# Patient Record
Sex: FEMALE | Race: BLACK OR AFRICAN AMERICAN | Employment: UNEMPLOYED | URBAN - METROPOLITAN AREA
[De-identification: names, ages, dates, MRNs, and addresses within clinical notes are randomized per-mention and may not be internally consistent; named-entity substitution may affect disease eponyms.]

---

## 2017-01-28 NOTE — TELEPHONE ENCOUNTER
Please advise on refill request.     Recent Visits       Provider Department Primary Dx    3 months ago Consuelo Medina MD [facility], [address], [place] Chronic nonintractable headache, unspecified headache type    5 months ago [place],

## 2017-01-31 RX ORDER — DIAZEPAM 5 MG/1
TABLET ORAL
Qty: 30 TABLET | Refills: 0 | OUTPATIENT
Start: 2017-01-31 | End: 2017-03-01

## 2017-02-01 NOTE — TELEPHONE ENCOUNTER
Nathanael form [facility] pharmacy states they have nothing on file, No medication.  Please advise

## 2017-02-03 RX ORDER — DIAZEPAM 5 MG/1
TABLET ORAL
Qty: 30 TABLET | Refills: 0 | OUTPATIENT
Start: 2017-02-03

## 2017-02-03 RX ORDER — BUTALBITAL, ACETAMINOPHEN AND CAFFEINE 50; 325; 40 MG/1; MG/1; MG/1
TABLET ORAL
Qty: 30 TABLET | Refills: 0 | OUTPATIENT
Start: 2017-02-03 | End: 2017-02-21

## 2017-02-03 NOTE — TELEPHONE ENCOUNTER
Refill request for fioricet -40 mg, take 1 tab every 4 hrs as needed, #30, no refills    LOV: 12/5/16  NOV: None  Last refilled on 12/27/16

## 2017-02-03 NOTE — TELEPHONE ENCOUNTER
Andry Urena RN at 2/1/2017  4:48 PM      Status: Signed : Andry Urena RN (Registered Nurse)     Expand All Collapse All    Diazepam 5 mg phoned to [facility] pharmacy as directed by Dr Regina Mi.

## 2017-02-21 NOTE — TELEPHONE ENCOUNTER
Refill request for fioricet -40 mg, take 1 tab every 4 hrs as needed, #30, no refills    LOV: 12/5/16  NOV: none  Last refilled on 2/3/17

## 2017-02-24 RX ORDER — BUTALBITAL, ACETAMINOPHEN AND CAFFEINE 50; 325; 40 MG/1; MG/1; MG/1
TABLET ORAL
Qty: 30 TABLET | Refills: 0 | OUTPATIENT
Start: 2017-02-24 | End: 2017-03-21

## 2017-02-24 NOTE — TELEPHONE ENCOUNTER
Approved          Disp Refills Start End       BUTALBITAL-APAP-CAFFEINE -40 MG Oral Tab 30 tablet 0 2/24/2017        Sig:  TAKE 1 TABLET BY MOUTH EVERY 4 HOURS AS NEEDED       Class:  Phone in       ANTHONY:  No       Authorizing Provider:  Radha Mejia,

## 2017-03-01 RX ORDER — DIAZEPAM 5 MG/1
TABLET ORAL
Qty: 30 TABLET | Refills: 0 | Status: SHIPPED | OUTPATIENT
Start: 2017-03-01 | End: 2017-03-25

## 2017-03-01 NOTE — TELEPHONE ENCOUNTER
Per printed script for Dr. Nuria Summers, [facility] 5 mg tabs. Qty # 30 with no additional refills.

## 2017-03-21 NOTE — TELEPHONE ENCOUNTER
Refill request for fioricet -40 mg, take 1 tab every 4 hrs as needed, #30, no refills    LOV: 12/05/2016  NOV: none  Last refilled on 2/24/17

## 2017-03-22 RX ORDER — BUTALBITAL, ACETAMINOPHEN AND CAFFEINE 50; 325; 40 MG/1; MG/1; MG/1
TABLET ORAL
Qty: 30 TABLET | Refills: 0 | OUTPATIENT
Start: 2017-03-22 | End: 2017-04-14

## 2017-03-27 RX ORDER — DIAZEPAM 5 MG/1
TABLET ORAL
Qty: 30 TABLET | Refills: 0 | Status: SHIPPED | OUTPATIENT
Start: 2017-03-27 | End: 2017-04-29

## 2017-03-27 NOTE — TELEPHONE ENCOUNTER
Approved refill called to [facility], for Diazepam 5 mg tab, qty # 30 with no additional refills as directed by Dr. Gordo Mon.

## 2017-04-14 ENCOUNTER — TELEPHONE (OUTPATIENT)
Dept: NEUROLOGY | Facility: CLINIC | Age: 52
End: 2017-04-14

## 2017-04-14 RX ORDER — BUTALBITAL, ACETAMINOPHEN AND CAFFEINE 50; 325; 40 MG/1; MG/1; MG/1
TABLET ORAL
Qty: 30 TABLET | Refills: 0 | Status: SHIPPED | OUTPATIENT
Start: 2017-04-14 | End: 2017-04-14

## 2017-04-14 RX ORDER — BUTALBITAL, ACETAMINOPHEN AND CAFFEINE 50; 325; 40 MG/1; MG/1; MG/1
TABLET ORAL
Qty: 30 TABLET | Refills: 0 | Status: SHIPPED | OUTPATIENT
Start: 2017-04-14 | End: 2017-05-26

## 2017-04-14 NOTE — TELEPHONE ENCOUNTER
Prescription for Fioricet has been called into [facility].

## 2017-04-17 ENCOUNTER — TELEPHONE (OUTPATIENT)
Dept: INTERNAL MEDICINE CLINIC | Facility: CLINIC | Age: 52
End: 2017-04-17

## 2017-04-17 DIAGNOSIS — S82.831A CLOSED FRACTURE OF DISTAL END OF RIGHT FIBULA, UNSPECIFIED FRACTURE MORPHOLOGY, INITIAL ENCOUNTER: Primary | ICD-10-CM
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
## 2017-04-18 ENCOUNTER — TELEPHONE (OUTPATIENT)
Dept: NEUROLOGY | Facility: CLINIC | Age: 52
End: 2017-04-18

## 2017-04-18 NOTE — TELEPHONE ENCOUNTER
Spoke to Will at [facility] and was told that they are not in contract with [facility]. The name that was given to the patient when she was in the ER was  only a recommendation .  Since this is an out of network provider she may be responsible

## 2017-04-20 ENCOUNTER — TELEPHONE (OUTPATIENT)
Dept: INTERNAL MEDICINE CLINIC | Facility: CLINIC | Age: 52
End: 2017-04-20

## 2017-04-20 DIAGNOSIS — S82.90XA: Primary | ICD-10-CM

## 2017-04-20 NOTE — TELEPHONE ENCOUNTER
Per pt, she stts that Dr Teddy Quevedo will accept her as long as she has the referral.  And [facility] referred pt to go and see this

## 2017-04-21 NOTE — TELEPHONE ENCOUNTER
I spoke with Dr Gonzalo Gonzáles office and was advised they do not accept Poonam Cespedes. I spoke with Will in Dr Leida Gomez office and he indicated the patient was directed back to pcp because they do not accept her insurance.  I will process referral for Melania Infante

## 2017-04-21 NOTE — TELEPHONE ENCOUNTER
Managed care, please see referral to see Dr. Teddy Quevedo already ordered by TK on 4/18. Please notify pt when approved.

## 2017-05-01 RX ORDER — DIAZEPAM 5 MG/1
TABLET ORAL
Qty: 30 TABLET | Refills: 0 | Status: SHIPPED | OUTPATIENT
Start: 2017-05-01 | End: 2017-05-30

## 2017-05-01 NOTE — TELEPHONE ENCOUNTER
Per printed script from Dr. Kristi Swartz, refill called to [facility] for Diazepam 5 mg tab, qty# 30 with no additional refills.

## 2017-05-02 NOTE — TELEPHONE ENCOUNTER
Current outpatient prescriptions:   •  •  AmLODIPine Besylate 5 MG Oral Tab, Take 1 tablet (5 mg total) by mouth daily. , Disp: 90 tablet, Rfl: 3

## 2017-05-08 NOTE — TELEPHONE ENCOUNTER
Pt f/u on refill request. She is out of meds and pharmacy did give her 3 emergency pills. Refill should be going to the [facility].

## 2017-05-09 RX ORDER — AMLODIPINE BESYLATE 5 MG/1
5 TABLET ORAL DAILY
Qty: 30 TABLET | Refills: 0 | Status: SHIPPED | OUTPATIENT
Start: 2017-05-09 | End: 2017-06-07

## 2017-05-09 NOTE — TELEPHONE ENCOUNTER
Dr. Regina Mi, 30 day supply sent to pts pharmacy per protocol d/t pt being out of rx. Please advise on further refills. Thank you. Spoke to pt and informed 30 day supply was sent to pharmacy and dr. Regina Mi is to advise on further refills.  Pt verbaliz

## 2017-05-10 RX ORDER — AMLODIPINE BESYLATE 5 MG/1
5 TABLET ORAL DAILY
Qty: 30 TABLET | Refills: 0 | Status: SHIPPED | OUTPATIENT
Start: 2017-05-10 | End: 2018-01-16 | Stop reason: DRUGHIGH
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
## 2017-05-15 ENCOUNTER — TELEPHONE (OUTPATIENT)
Dept: NEUROLOGY | Facility: CLINIC | Age: 52
End: 2017-05-15

## 2017-05-25 ENCOUNTER — LAB ENCOUNTER (OUTPATIENT)
Dept: LAB | Age: 52
End: 2017-05-25
Attending: INTERNAL MEDICINE
Payer: MEDICAID

## 2017-05-25 ENCOUNTER — OFFICE VISIT (OUTPATIENT)
Dept: INTERNAL MEDICINE CLINIC | Facility: CLINIC | Age: 52
End: 2017-05-25

## 2017-05-25 VITALS
HEART RATE: 83 BPM | HEIGHT: 64 IN | WEIGHT: 171 LBS | BODY MASS INDEX: 29.19 KG/M2 | DIASTOLIC BLOOD PRESSURE: 78 MMHG | TEMPERATURE: 97 F | SYSTOLIC BLOOD PRESSURE: 113 MMHG

## 2017-05-25 DIAGNOSIS — E34.8 PINEAL GLAND CYST: ICD-10-CM

## 2017-05-25 DIAGNOSIS — Z00.00 ANNUAL PHYSICAL EXAM: ICD-10-CM

## 2017-05-25 DIAGNOSIS — I10 ESSENTIAL HYPERTENSION WITH GOAL BLOOD PRESSURE LESS THAN 140/90: ICD-10-CM

## 2017-05-25 DIAGNOSIS — K92.1 HEMATOCHEZIA: ICD-10-CM

## 2017-05-25 DIAGNOSIS — G43.009 MIGRAINE WITHOUT AURA AND WITHOUT STATUS MIGRAINOSUS, NOT INTRACTABLE: ICD-10-CM

## 2017-05-25 DIAGNOSIS — Z00.00 ANNUAL PHYSICAL EXAM: Primary | ICD-10-CM

## 2017-05-25 DIAGNOSIS — E78.2 MIXED HYPERLIPIDEMIA: ICD-10-CM

## 2017-05-25 PROCEDURE — 80061 LIPID PANEL: CPT

## 2017-05-25 PROCEDURE — 80053 COMPREHEN METABOLIC PANEL: CPT

## 2017-05-25 PROCEDURE — 36415 COLL VENOUS BLD VENIPUNCTURE: CPT

## 2017-05-25 PROCEDURE — 99396 PREV VISIT EST AGE 40-64: CPT | Performed by: INTERNAL MEDICINE

## 2017-05-25 PROCEDURE — 81003 URINALYSIS AUTO W/O SCOPE: CPT

## 2017-05-25 PROCEDURE — 85025 COMPLETE CBC W/AUTO DIFF WBC: CPT

## 2017-05-25 RX ORDER — IBUPROFEN 600 MG/1
1 TABLET ORAL AS NEEDED
Refills: 0 | COMMUNITY
Start: 2017-05-17 | End: 2021-06-08

## 2017-05-25 NOTE — PROGRESS NOTES
HPI:    Patient ID: Sunita Shin is a 54year old female. HPI Comments: Patient presents today for her annual physical.      Review of Systems   Constitutional: Negative for fever and appetite change.    Respiratory: Negative for cough, shortness of br exhibits no discharge. Left eye exhibits no discharge. No scleral icterus. Neck: Normal range of motion. Neck supple. No JVD present. No thyromegaly present. Cardiovascular: Normal rate, regular rhythm and normal heart sounds.   Exam reveals no gallop a Visit:  No prescriptions requested or ordered in this encounter       Imaging & Referrals:  None       RD#[id number]

## 2017-05-26 RX ORDER — BUTALBITAL, ACETAMINOPHEN AND CAFFEINE 50; 325; 40 MG/1; MG/1; MG/1
TABLET ORAL
Qty: 30 TABLET | Refills: 0 | OUTPATIENT
Start: 2017-05-26 | End: 2017-07-06

## 2017-05-26 NOTE — TELEPHONE ENCOUNTER
Refill request for fioricet -40 mg, take 1 tab as needed, #30, no refills    LOV: 12/5/16  NOV: 6/7/17

## 2017-05-30 RX ORDER — DIAZEPAM 5 MG/1
TABLET ORAL
Qty: 30 TABLET | Refills: 0 | Status: SHIPPED | OUTPATIENT
Start: 2017-05-30 | End: 2017-06-28

## 2017-05-30 NOTE — TELEPHONE ENCOUNTER
Per printed script by Dr. Santo Lugo, refill called to [facility] for Diazepam 5 mg qty # 30 with no additional refills.

## 2017-06-02 NOTE — TELEPHONE ENCOUNTER
Pharmacy called in to follow up on refill request for pt.     Current outpatient prescriptions:     •  Lovastatin 20 MG Oral Tab, Take 1 tablet (20 mg total) by mouth nightly., Disp: 90 tablet, Rfl: 3

## 2017-06-05 ENCOUNTER — TELEPHONE (OUTPATIENT)
Dept: GASTROENTEROLOGY | Facility: CLINIC | Age: 52
End: 2017-06-05

## 2017-06-05 ENCOUNTER — OFFICE VISIT (OUTPATIENT)
Dept: GASTROENTEROLOGY | Facility: CLINIC | Age: 52
End: 2017-06-05

## 2017-06-05 VITALS
SYSTOLIC BLOOD PRESSURE: 138 MMHG | HEIGHT: 64 IN | DIASTOLIC BLOOD PRESSURE: 90 MMHG | WEIGHT: 166 LBS | BODY MASS INDEX: 28.34 KG/M2 | HEART RATE: 67 BPM

## 2017-06-05 DIAGNOSIS — R10.30 LOWER ABDOMINAL PAIN: ICD-10-CM

## 2017-06-05 DIAGNOSIS — R19.4 ALTERED BOWEL HABITS: ICD-10-CM

## 2017-06-05 DIAGNOSIS — K21.9 GASTROESOPHAGEAL REFLUX DISEASE, ESOPHAGITIS PRESENCE NOT SPECIFIED: ICD-10-CM

## 2017-06-05 DIAGNOSIS — R45.89 INEFFECTIVE COPING: ICD-10-CM

## 2017-06-05 DIAGNOSIS — K62.5 RECTAL BLEEDING: Primary | ICD-10-CM

## 2017-06-05 DIAGNOSIS — K92.1 HEMATOCHEZIA: ICD-10-CM

## 2017-06-05 PROCEDURE — 99212 OFFICE O/P EST SF 10 MIN: CPT | Performed by: NURSE PRACTITIONER

## 2017-06-05 PROCEDURE — 99244 OFF/OP CNSLTJ NEW/EST MOD 40: CPT | Performed by: NURSE PRACTITIONER

## 2017-06-05 RX ORDER — LOVASTATIN 20 MG/1
20 TABLET ORAL NIGHTLY
Qty: 90 TABLET | Refills: 1 | Status: SHIPPED | OUTPATIENT
Start: 2017-06-05 | End: 2017-12-16

## 2017-06-05 NOTE — PATIENT INSTRUCTIONS
1. Schedule colonoscopy with Dr. Rose Currie w/ [facility]    2.  bowel prep from pharmacy Colyte (eRx)    3. Continue all medications for procedure    4. Read all bowel prep instructions carefully    5.  AVOID seeds, nuts, popcorn, raw fruits and vegetables (
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
## 2017-06-05 NOTE — TELEPHONE ENCOUNTER
Scheduled for:  Colonoscopy 14087  Provider Name: Dr Perla Jones  Date:  Ygnacio Duane 8/17/17  Location:  [facility]  Sedation:  MAC  Time:  9:45 am  Prep: colyte  Meds/Allergies Reconciled?:  PCN  Diagnosis with codes:  Rectal bleeding K62.5  Was patient informed to call insura

## 2017-06-07 ENCOUNTER — OFFICE VISIT (OUTPATIENT)
Dept: NEUROLOGY | Facility: CLINIC | Age: 52
End: 2017-06-07

## 2017-06-07 VITALS
OXYGEN SATURATION: 99 % | HEART RATE: 91 BPM | SYSTOLIC BLOOD PRESSURE: 136 MMHG | HEIGHT: 64 IN | BODY MASS INDEX: 25.61 KG/M2 | DIASTOLIC BLOOD PRESSURE: 90 MMHG | WEIGHT: 150 LBS

## 2017-06-07 DIAGNOSIS — S06.9X9S TBI (TRAUMATIC BRAIN INJURY), WITH LOC OF UNSPECIFIED DURATION, SEQUELA: ICD-10-CM

## 2017-06-07 DIAGNOSIS — R51.9 CHRONIC NONINTRACTABLE HEADACHE, UNSPECIFIED HEADACHE TYPE: Primary | ICD-10-CM

## 2017-06-07 DIAGNOSIS — R40.4 EPISODIC ALTERED AWARENESS: ICD-10-CM

## 2017-06-07 DIAGNOSIS — Z86.39 HISTORY OF PINEAL CYST: ICD-10-CM

## 2017-06-07 DIAGNOSIS — G89.29 CHRONIC NONINTRACTABLE HEADACHE, UNSPECIFIED HEADACHE TYPE: Primary | ICD-10-CM

## 2017-06-07 PROCEDURE — 99214 OFFICE O/P EST MOD 30 MIN: CPT | Performed by: OTHER

## 2017-06-07 RX ORDER — AMLODIPINE BESYLATE 5 MG/1
TABLET ORAL
Qty: 30 TABLET | Refills: 2 | Status: SHIPPED | OUTPATIENT
Start: 2017-06-07 | End: 2017-07-25

## 2017-06-07 RX ORDER — AMITRIPTYLINE HYDROCHLORIDE 25 MG/1
25 TABLET, FILM COATED ORAL NIGHTLY
Qty: 1 TABLET | Refills: 1 | Status: SHIPPED | OUTPATIENT
Start: 2017-06-07 | End: 2017-08-01

## 2017-06-08 ENCOUNTER — TELEPHONE (OUTPATIENT)
Dept: NEUROLOGY | Facility: CLINIC | Age: 52
End: 2017-06-08

## 2017-06-08 NOTE — TELEPHONE ENCOUNTER
[facility] from [address] [place] [address] 6/7/17. Told to dispense #30 tabs.

## 2017-06-13 NOTE — PROGRESS NOTES
[facility]    Justo Lennon is a 54year old female. HPI:     Patient presents for follow-up. She was previously followed by Dr. Liza Raymond, last seen in December 2016. Notes reviewed.   Patient is a somewhat suboptimal historian intracranial process. 3.  Chronic bilateral mastoid air cell opacification.  Correlate for bilateral otomastoiditis .  Trace chronic inflammation of the ethmoid sinuses.         Past Medical History: reviewed    Past Surgical History: reviewed    Social Hi regular rate and rhythm   Lungs: clear to auscultation bilaterally  Neuro:  Mental Status: alert, speech fluent and appropriate       Cranial Nerves: pupils equal, round, and reactive to light; extraocular movements intact; facial sensation intact V1-3, fa

## 2017-06-28 RX ORDER — DIAZEPAM 5 MG/1
TABLET ORAL
Qty: 30 TABLET | Refills: 0 | Status: SHIPPED | OUTPATIENT
Start: 2017-06-28 | End: 2017-07-27

## 2017-07-05 RX ORDER — BUTALBITAL, ACETAMINOPHEN AND CAFFEINE 50; 325; 40 MG/1; MG/1; MG/1
TABLET ORAL
Qty: 30 TABLET | Refills: 1 | Status: CANCELLED | OUTPATIENT
Start: 2017-07-05

## 2017-07-05 NOTE — TELEPHONE ENCOUNTER
LOV was 6/07/17. Last Butalbital-acet-caffeine, #30 tab refill, was given 5/26/17. Dr. Duane Kaye office note on 6/07/17 states that patient was to \"decrease frequency of Fioricet use\". Patient wants refill. Please advise.

## 2017-07-07 NOTE — TELEPHONE ENCOUNTER
Medication request: Butalbital-Apap-Caffeine -40mg    LOV 6/7/17  NOV 7/21/17    Last refill: 6/14/17 #30 per pharmacy

## 2017-07-08 RX ORDER — BUTALBITAL, ACETAMINOPHEN AND CAFFEINE 50; 325; 40 MG/1; MG/1; MG/1
TABLET ORAL
Qty: 30 TABLET | Refills: 1 | OUTPATIENT
Start: 2017-07-08 | End: 2017-07-25

## 2017-07-10 RX ORDER — BUTALBITAL, ACETAMINOPHEN AND CAFFEINE 50; 325; 40 MG/1; MG/1; MG/1
TABLET ORAL
Qty: 30 TABLET | Refills: 0 | OUTPATIENT
Start: 2017-07-10 | End: 2017-08-07

## 2017-07-17 ENCOUNTER — TELEPHONE (OUTPATIENT)
Dept: NEUROLOGY | Facility: CLINIC | Age: 52
End: 2017-07-17

## 2017-07-20 ENCOUNTER — TELEPHONE (OUTPATIENT)
Dept: INTERNAL MEDICINE CLINIC | Facility: CLINIC | Age: 52
End: 2017-07-20

## 2017-07-20 NOTE — TELEPHONE ENCOUNTER
Pharmacy states that that because Rx has never been filled at their location, they are unable to e-scribe request and don't have dose information.  Patient is requesting refill for the following medication:    Current Outpatient Prescriptions:  lisinopril 1

## 2017-07-21 RX ORDER — LISINOPRIL 10 MG/1
TABLET ORAL
Qty: 180 TABLET | Refills: 0 | Status: SHIPPED | OUTPATIENT
Start: 2017-07-21 | End: 2017-10-17

## 2017-07-21 NOTE — TELEPHONE ENCOUNTER
Refill protocol criteria met, rx sent.       Hypertensive Medications  Protocol Criteria:  · Appointment scheduled in the past 6 months or in the next 3 months  · BMP or CMP in the past 12 months  · Creatinine result < 2  Recent Outpatient Visits

## 2017-07-21 NOTE — TELEPHONE ENCOUNTER
Pt calling c/o of sx because she is out of med. Pt was transferred to triage. Pt checking status of refill.

## 2017-07-25 ENCOUNTER — OFFICE VISIT (OUTPATIENT)
Dept: NEUROLOGY | Facility: CLINIC | Age: 52
End: 2017-07-25

## 2017-07-25 VITALS
RESPIRATION RATE: 16 BRPM | BODY MASS INDEX: 25.61 KG/M2 | WEIGHT: 150 LBS | SYSTOLIC BLOOD PRESSURE: 116 MMHG | HEIGHT: 64 IN | DIASTOLIC BLOOD PRESSURE: 82 MMHG

## 2017-07-25 DIAGNOSIS — R40.4 EPISODIC ALTERED AWARENESS: ICD-10-CM

## 2017-07-25 DIAGNOSIS — R51.9 CHRONIC NONINTRACTABLE HEADACHE, UNSPECIFIED HEADACHE TYPE: Primary | ICD-10-CM

## 2017-07-25 DIAGNOSIS — G89.29 CHRONIC NONINTRACTABLE HEADACHE, UNSPECIFIED HEADACHE TYPE: Primary | ICD-10-CM

## 2017-07-25 DIAGNOSIS — Z86.39 HISTORY OF PINEAL CYST: ICD-10-CM

## 2017-07-25 DIAGNOSIS — S06.9X9S TBI (TRAUMATIC BRAIN INJURY), WITH LOC OF UNSPECIFIED DURATION, SEQUELA: ICD-10-CM

## 2017-07-25 PROCEDURE — 99213 OFFICE O/P EST LOW 20 MIN: CPT | Performed by: OTHER

## 2017-07-25 NOTE — PROGRESS NOTES
[facility]    Wandy Montes is a 54year old female. HPI:     Patient is being seen in follow-up. I saw her in clinic last 6/7/17. Since last visit, she has been doing a little bit better with the amitriptyline.   She has be fevers  HEENT: See HPI  RESPIRATORY: denies shortness of breath, wheezing or cough   CARDIOVASCULAR: Uncontrolled blood pressure  MUSCULOSKELETAL: see HPI  NEURO: see HPI      PHYSICAL EXAM:     Vitals:  /82 (BP Location: Left arm, Patient Position:

## 2017-07-27 RX ORDER — DIAZEPAM 5 MG/1
TABLET ORAL
Qty: 30 TABLET | Refills: 0 | Status: SHIPPED | OUTPATIENT
Start: 2017-07-27 | End: 2017-08-24

## 2017-08-01 RX ORDER — AMITRIPTYLINE HYDROCHLORIDE 25 MG/1
TABLET, FILM COATED ORAL
Qty: 30 TABLET | Refills: 1 | Status: SHIPPED | OUTPATIENT
Start: 2017-08-01 | End: 2017-09-24

## 2017-08-07 NOTE — TELEPHONE ENCOUNTER
Medication request: Butalbital-Apap-Caffeine -40mg take 1 tab prn, qt:30 1 refill    LOV: 7/25/17  NOV: None    Last refill: 7/10/17 qt:30

## 2017-08-08 RX ORDER — BUTALBITAL, ACETAMINOPHEN AND CAFFEINE 50; 325; 40 MG/1; MG/1; MG/1
TABLET ORAL
Qty: 30 TABLET | Refills: 1 | OUTPATIENT
Start: 2017-08-08 | End: 2017-09-06

## 2017-08-17 ENCOUNTER — ANESTHESIA (OUTPATIENT)
Dept: ENDOSCOPY | Facility: HOSPITAL | Age: 52
End: 2017-08-17

## 2017-08-17 ENCOUNTER — TELEPHONE (OUTPATIENT)
Dept: GASTROENTEROLOGY | Facility: CLINIC | Age: 52
End: 2017-08-17

## 2017-08-17 ENCOUNTER — SURGERY (OUTPATIENT)
Age: 52
End: 2017-08-17

## 2017-08-17 ENCOUNTER — HOSPITAL ENCOUNTER (OUTPATIENT)
Facility: HOSPITAL | Age: 52
Setting detail: HOSPITAL OUTPATIENT SURGERY
Discharge: HOME | End: 2017-08-17
Attending: INTERNAL MEDICINE | Admitting: INTERNAL MEDICINE
Payer: COMMERCIAL

## 2017-08-17 ENCOUNTER — ANESTHESIA EVENT (OUTPATIENT)
Dept: ENDOSCOPY | Facility: HOSPITAL | Age: 52
End: 2017-08-17

## 2017-08-17 VITALS
HEIGHT: 64 IN | OXYGEN SATURATION: 99 % | WEIGHT: 155 LBS | RESPIRATION RATE: 15 BRPM | HEART RATE: 70 BPM | SYSTOLIC BLOOD PRESSURE: 116 MMHG | DIASTOLIC BLOOD PRESSURE: 88 MMHG | BODY MASS INDEX: 26.46 KG/M2

## 2017-08-17 DIAGNOSIS — K64.9 HEMORRHOIDS, UNSPECIFIED HEMORRHOID TYPE: Primary | ICD-10-CM

## 2017-08-17 DIAGNOSIS — K64.9 HEMORRHOIDS, UNSPECIFIED HEMORRHOID TYPE: ICD-10-CM

## 2017-08-17 DIAGNOSIS — K62.5 RECTAL BLEEDING: Primary | ICD-10-CM

## 2017-08-17 PROCEDURE — 0DJD8ZZ INSPECTION OF LOWER INTESTINAL TRACT, VIA NATURAL OR ARTIFICIAL OPENING ENDOSCOPIC: ICD-10-PCS | Performed by: INTERNAL MEDICINE

## 2017-08-17 PROCEDURE — 45378 DIAGNOSTIC COLONOSCOPY: CPT | Performed by: INTERNAL MEDICINE

## 2017-08-17 RX ORDER — SODIUM CHLORIDE, SODIUM LACTATE, POTASSIUM CHLORIDE, CALCIUM CHLORIDE 600; 310; 30; 20 MG/100ML; MG/100ML; MG/100ML; MG/100ML
INJECTION, SOLUTION INTRAVENOUS CONTINUOUS
Status: DISCONTINUED | OUTPATIENT
Start: 2017-08-17 | End: 2017-08-17

## 2017-08-17 RX ORDER — NALOXONE HYDROCHLORIDE 0.4 MG/ML
80 INJECTION, SOLUTION INTRAMUSCULAR; INTRAVENOUS; SUBCUTANEOUS AS NEEDED
Status: DISCONTINUED | OUTPATIENT
Start: 2017-08-17 | End: 2017-08-17

## 2017-08-17 RX ORDER — SODIUM CHLORIDE, SODIUM LACTATE, POTASSIUM CHLORIDE, CALCIUM CHLORIDE 600; 310; 30; 20 MG/100ML; MG/100ML; MG/100ML; MG/100ML
INJECTION, SOLUTION INTRAVENOUS CONTINUOUS
Status: CANCELLED | OUTPATIENT
Start: 2017-08-17

## 2017-08-17 RX ORDER — HYDROMORPHONE HYDROCHLORIDE 1 MG/ML
0.4 INJECTION, SOLUTION INTRAMUSCULAR; INTRAVENOUS; SUBCUTANEOUS EVERY 5 MIN PRN
Status: DISCONTINUED | OUTPATIENT
Start: 2017-08-17 | End: 2017-08-17

## 2017-08-17 RX ORDER — LIDOCAINE HYDROCHLORIDE 10 MG/ML
INJECTION, SOLUTION EPIDURAL; INFILTRATION; INTRACAUDAL; PERINEURAL AS NEEDED
Status: DISCONTINUED | OUTPATIENT
Start: 2017-08-17 | End: 2017-08-17 | Stop reason: SURG

## 2017-08-17 RX ORDER — HYDROMORPHONE HYDROCHLORIDE 1 MG/ML
0.2 INJECTION, SOLUTION INTRAMUSCULAR; INTRAVENOUS; SUBCUTANEOUS EVERY 5 MIN PRN
Status: DISCONTINUED | OUTPATIENT
Start: 2017-08-17 | End: 2017-08-17

## 2017-08-17 RX ORDER — HYDROMORPHONE HYDROCHLORIDE 1 MG/ML
0.6 INJECTION, SOLUTION INTRAMUSCULAR; INTRAVENOUS; SUBCUTANEOUS EVERY 5 MIN PRN
Status: DISCONTINUED | OUTPATIENT
Start: 2017-08-17 | End: 2017-08-17

## 2017-08-17 RX ORDER — NALOXONE HYDROCHLORIDE 0.4 MG/ML
80 INJECTION, SOLUTION INTRAMUSCULAR; INTRAVENOUS; SUBCUTANEOUS AS NEEDED
Status: CANCELLED | OUTPATIENT
Start: 2017-08-17 | End: 2017-08-17

## 2017-08-17 RX ORDER — HALOPERIDOL 5 MG/ML
0.25 INJECTION INTRAMUSCULAR ONCE AS NEEDED
Status: DISCONTINUED | OUTPATIENT
Start: 2017-08-17 | End: 2017-08-17

## 2017-08-17 RX ORDER — ONDANSETRON 2 MG/ML
4 INJECTION INTRAMUSCULAR; INTRAVENOUS ONCE AS NEEDED
Status: DISCONTINUED | OUTPATIENT
Start: 2017-08-17 | End: 2017-08-17

## 2017-08-17 RX ORDER — MORPHINE SULFATE 4 MG/ML
2 INJECTION, SOLUTION INTRAMUSCULAR; INTRAVENOUS EVERY 10 MIN PRN
Status: DISCONTINUED | OUTPATIENT
Start: 2017-08-17 | End: 2017-08-17

## 2017-08-17 RX ADMIN — SODIUM CHLORIDE, SODIUM LACTATE, POTASSIUM CHLORIDE, CALCIUM CHLORIDE: 600; 310; 30; 20 INJECTION, SOLUTION INTRAVENOUS at 09:56:00

## 2017-08-17 RX ADMIN — LIDOCAINE HYDROCHLORIDE 50 MG: 10 INJECTION, SOLUTION EPIDURAL; INFILTRATION; INTRACAUDAL; PERINEURAL at 09:29:00

## 2017-08-17 RX ADMIN — SODIUM CHLORIDE, SODIUM LACTATE, POTASSIUM CHLORIDE, CALCIUM CHLORIDE: 600; 310; 30; 20 INJECTION, SOLUTION INTRAVENOUS at 09:26:00

## 2017-08-17 NOTE — ANESTHESIA PREPROCEDURE EVALUATION
Anesthesia PreOp Note    HPI:     Parker Ledesma is a 54year old female who presents for preoperative consultation requested by: Rhea Moyer MD    Date of Surgery: 8/17/2017    Procedure(s):  COLONOSCOPY  Indication: Rectal bleeding      History Rev Oral Tab Take 1 tablet (5 mg total) by mouth daily.  Disp: 30 tablet Rfl: 0 8/16/2017 at 2100       Current Facility-Administered Medications Ordered in Epic:  lactated ringers infusion  Intravenous Continuous Janora Pallas Harwood Baldy, MD     No current Epic-ordere complications   Airway   Mallampati: II  TM distance: >3 FB  Neck ROM: full  Dental      Pulmonary - negative ROS and normal exam     ROS comment: Smoking hx  Cardiovascular - normal exam  (+) hypertension,     Neuro/Psych      Comments: Hx of head trauma

## 2017-08-17 NOTE — OPERATIVE REPORT
[facility] Endoscopy Report      Preoperative Diagnosis:  - rectal bleeding      Postoperative Diagnosis:  - moderated sized internal hemorrhoids      Procedure:    Colonoscopy         Surgeon:  Rafa Soriano M.D.     Anesthesia:  MAC sedat

## 2017-08-17 NOTE — TELEPHONE ENCOUNTER
Pt underwent colonoscopy today. Moderate sized internal hemorrhoids are the cause of her rectal bleeding.      Plan  - stool softeners, high fiber  - anusol suppository x 1 week  - see surgeon- RN to arrange with our surgeons  - blood count to make sure no

## 2017-08-17 NOTE — ANESTHESIA POSTPROCEDURE EVALUATION
Patient: Sherri Siemens    Procedure Summary     Date:  08/17/17 Room / Location:  [facility] ENDOSCOPY 05 / [facility] ENDOSCOPY    Anesthesia Start:  9121 Anesthesia Stop:      Procedure:  COLONOSCOPY (N/A ) Diagnosis:       Rectal bleeding      (hemorrhoids)    Surge

## 2017-08-17 NOTE — H&P
History & Physical Examination    Patient Name: Farida Thompson  MRN: [id number]  [facility]: [id number]  YOB: 1965    Diagnosis: rectal bleeding        Prescriptions Prior to Admission:  BUTALBITAL-APAP-CAFFEINE -40 MG Oral Tab TAKE 1 TABLET B Packs/day  For 21.00 Years     Types: Cigarettes    Smokeless tobacco: Not on file    Alcohol use No       SYSTEM Check if Review is Normal Check if Physical Exam is Normal If not normal, please explain:   HEENT [x ] [ x]    NECK & BACK [x ] [x ]    HEART

## 2017-08-18 NOTE — TELEPHONE ENCOUNTER
Spoke to pt on the phone. She was notified of her internal hemorrhoids. She was given the phone number to the surgeons. I put a referral in the system. She was notified to take a stool softener and high fiber diet.  She was megan notified to use an anusol s

## 2017-08-23 ENCOUNTER — TELEPHONE (OUTPATIENT)
Dept: GASTROENTEROLOGY | Facility: CLINIC | Age: 52
End: 2017-08-23

## 2017-08-23 NOTE — TELEPHONE ENCOUNTER
Dr Teresa Abreu, received determination [facility] [place] supp denied , as excluded in plan. The preferred list is     Analpram HC lotion  Pramoxine 1/5 foam --generics where available. Please advise. Thanks.

## 2017-08-23 NOTE — TELEPHONE ENCOUNTER
Current Outpatient Prescriptions:  Hydrocortisone Acetate 25 MG Rectal Suppos Place 1 suppository (25 mg total) rectally 2 (two) times daily. Disp: 14 suppository Rfl: 0     PA request pls call [phone] or change to Alt.  pls advise

## 2017-08-25 RX ORDER — DIAZEPAM 5 MG/1
TABLET ORAL
Qty: 30 TABLET | Refills: 0 | Status: SHIPPED | OUTPATIENT
Start: 2017-08-25 | End: 2017-09-24

## 2017-08-25 NOTE — TELEPHONE ENCOUNTER
Rx called into preferred lab per DR. PETERSEN approval.     DIAZEPAM 5 MG Oral Tab  TAKE 1 TABLET BY MOUTH EVERY DAY AT BEDTIME       Disp: 30 tablet Refills: 0    Class: Normal Start: 8/25/2017   Approved by: Delbert Mariano MD  To pharmacy: Not to exceed

## 2017-09-07 NOTE — TELEPHONE ENCOUNTER
Refill request for fioricet -40 mg, BID PRN, #60, 1 refill     LOV: 7/25/17  NOV: none  Last refilled on 8/8/17 for #30 with 1 refill

## 2017-09-08 RX ORDER — BUTALBITAL, ACETAMINOPHEN AND CAFFEINE 50; 325; 40 MG/1; MG/1; MG/1
TABLET ORAL
Qty: 30 TABLET | Refills: 0 | OUTPATIENT
Start: 2017-09-08 | End: 2017-09-24

## 2017-09-08 RX ORDER — AMLODIPINE BESYLATE 5 MG/1
TABLET ORAL
Qty: 30 TABLET | Refills: 2 | Status: SHIPPED | OUTPATIENT
Start: 2017-09-08 | End: 2017-12-30

## 2017-09-20 ENCOUNTER — TELEPHONE (OUTPATIENT)
Dept: NEUROLOGY | Facility: CLINIC | Age: 52
End: 2017-09-20

## 2017-09-21 RX ORDER — BUTALBITAL, ACETAMINOPHEN AND CAFFEINE 50; 325; 40 MG/1; MG/1; MG/1
TABLET ORAL
Qty: 30 TABLET | Refills: 0 | OUTPATIENT
Start: 2017-09-21

## 2017-09-21 NOTE — TELEPHONE ENCOUNTER
Patient out of fioricet with codeine. She is taking 3 tabs per day. Please advise if refill can be given early.

## 2017-09-25 RX ORDER — DIAZEPAM 5 MG/1
TABLET ORAL
Qty: 30 TABLET | Refills: 0 | Status: SHIPPED | OUTPATIENT
Start: 2017-09-25 | End: 2017-10-23

## 2017-09-26 RX ORDER — BUTALBITAL, ACETAMINOPHEN AND CAFFEINE 50; 325; 40 MG/1; MG/1; MG/1
TABLET ORAL
Qty: 30 TABLET | Refills: 0 | OUTPATIENT
Start: 2017-09-26 | End: 2018-01-10

## 2017-09-26 RX ORDER — BUTALBITAL, ACETAMINOPHEN AND CAFFEINE 50; 325; 40 MG/1; MG/1; MG/1
TABLET ORAL
Qty: 30 TABLET | Refills: 0 | OUTPATIENT
Start: 2017-09-26

## 2017-09-26 RX ORDER — AMITRIPTYLINE HYDROCHLORIDE 25 MG/1
TABLET, FILM COATED ORAL
Qty: 30 TABLET | Refills: 1 | Status: SHIPPED | OUTPATIENT
Start: 2017-09-26 | End: 2018-01-23

## 2017-09-26 NOTE — TELEPHONE ENCOUNTER
Refill request for Amitriptyline 25 mg take 1 tab every evening, qt:30 1 refill  Butalbital-apap-caffeine -40 mg take 1 tab BID prn, qt:30 0 refills    LOV: 7/25/17  NOV: None  Last refill: Butalbital 9/8/17 qt:30  Amitriptyline 8/1/17 qt:30 1 refill

## 2017-09-28 ENCOUNTER — TELEPHONE (OUTPATIENT)
Dept: GASTROENTEROLOGY | Facility: CLINIC | Age: 52
End: 2017-09-28

## 2017-09-28 NOTE — TELEPHONE ENCOUNTER
Mailed letter to patient notifying him of overdue labs (CBC) Ordered 8-17-17. Overdue letter mailed to patient.

## 2017-10-12 ENCOUNTER — TELEPHONE (OUTPATIENT)
Dept: NEUROLOGY | Facility: CLINIC | Age: 52
End: 2017-10-12

## 2017-10-16 RX ORDER — BUTALBITAL, ACETAMINOPHEN AND CAFFEINE 50; 325; 40 MG/1; MG/1; MG/1
TABLET ORAL
Qty: 30 TABLET | Refills: 0 | OUTPATIENT
Start: 2017-10-16

## 2017-10-17 RX ORDER — LISINOPRIL 10 MG/1
TABLET ORAL
Qty: 180 TABLET | Refills: 0 | Status: SHIPPED | OUTPATIENT
Start: 2017-10-17 | End: 2018-01-31

## 2017-10-17 NOTE — TELEPHONE ENCOUNTER
Refilled per protocol    Hypertensive Medications  Protocol Criteria:  · Appointment scheduled in the past 6 months or in the next 3 months  · BMP or CMP in the past 12 months  · Creatinine result < 2  Recent Outpatient Visits            2 months ago Chron

## 2017-10-23 RX ORDER — DIAZEPAM 5 MG/1
TABLET ORAL
Qty: 30 TABLET | Refills: 0 | OUTPATIENT
Start: 2017-10-23 | End: 2018-01-10

## 2017-10-23 NOTE — TELEPHONE ENCOUNTER
Please advise on refill request.    Recent Outpatient Visits            3 months ago Chronic nonintractable headache, unspecified headache type    RADHA Jessica, [address] MD    Office Visit    4 months ago [facility]

## 2017-10-24 RX ORDER — DIAZEPAM 5 MG/1
TABLET ORAL
Qty: 30 TABLET | Refills: 0 | OUTPATIENT
Start: 2017-10-24

## 2017-10-24 NOTE — TELEPHONE ENCOUNTER
I did approve this  Already yesterday so just needs to be called in.  However this may be the last time I can refill this since she is illinicare and we no longer take her insurance so she needs to look for another pcp who can continue her meds and her care

## 2017-10-24 NOTE — TELEPHONE ENCOUNTER
Pharmacy   [facility] [address] [facility], [phone], [phone]   Medication Detail    Disp Refills Start End    DIAZEPAM 5 MG Oral Tab 30 tablet 0 10/23/2017     Sig: TAKE 1 TABLET BY MOUTH AT BEDTIME

## 2017-12-18 RX ORDER — LOVASTATIN 20 MG/1
20 TABLET ORAL NIGHTLY
Qty: 30 TABLET | Refills: 0 | Status: SHIPPED | OUTPATIENT
Start: 2017-12-18 | End: 2018-01-22

## 2017-12-18 NOTE — TELEPHONE ENCOUNTER
Pt has illinicare and we no longer are network provider; she should look for another pcp to ffup with. We can refill her for 30 days just to tide her til she sees new pcp.

## 2017-12-20 NOTE — TELEPHONE ENCOUNTER
S/W patient. Advised med refilled x's 1 month supply. Related DR. Nava's message. Patient states she will have [employer] on January 1, 2018.

## 2017-12-30 RX ORDER — AMLODIPINE BESYLATE 5 MG/1
TABLET ORAL
Qty: 30 TABLET | Refills: 0 | Status: SHIPPED | OUTPATIENT
Start: 2017-12-30 | End: 2018-01-16 | Stop reason: DRUGHIGH

## 2018-01-05 ENCOUNTER — TELEPHONE (OUTPATIENT)
Dept: OTHER | Age: 53
End: 2018-01-05

## 2018-01-05 NOTE — TELEPHONE ENCOUNTER
Pharmacist states Amlodipine 5 mg is on back order, asking if prescription can be changed to Amlodipine 10 mg 1/2 tablet daily. Please advise.

## 2018-01-10 ENCOUNTER — TELEPHONE (OUTPATIENT)
Dept: NEUROLOGY | Facility: CLINIC | Age: 53
End: 2018-01-10

## 2018-01-10 RX ORDER — DIAZEPAM 5 MG/1
TABLET ORAL
Qty: 30 TABLET | Refills: 0 | Status: SHIPPED | OUTPATIENT
Start: 2018-01-10 | End: 2018-02-09

## 2018-01-10 RX ORDER — BUTALBITAL, ACETAMINOPHEN AND CAFFEINE 50; 325; 40 MG/1; MG/1; MG/1
TABLET ORAL
Qty: 30 TABLET | Refills: 0 | OUTPATIENT
Start: 2018-01-10

## 2018-01-10 RX ORDER — BUTALBITAL, ACETAMINOPHEN AND CAFFEINE 50; 325; 40 MG/1; MG/1; MG/1
TABLET ORAL
Qty: 30 TABLET | Refills: 0 | OUTPATIENT
Start: 2018-01-10 | End: 2018-01-23

## 2018-01-10 NOTE — TELEPHONE ENCOUNTER
Script for diazepam 1/10/18 called to [facility], message left 1/10/18. Patient notified, she has appointment with Dr. Luis Landaverde on 1/16/18. Advised patient to call if she had any issues or questions; she agreed.     [address] -

## 2018-01-10 NOTE — TELEPHONE ENCOUNTER
Refill request for fioricet -40 mg, take 1 tab BID PRN, #30, no refills    LOV: 7/25/17  NOV: 1/23/18  Last refilled on 9/26/17

## 2018-01-10 NOTE — TELEPHONE ENCOUNTER
Pt has [facility] now, updated insurance, needs refill of BUTALBITAL-APAP-CAFFEINE -40 MG     NOV: 1/23/18

## 2018-01-16 ENCOUNTER — OFFICE VISIT (OUTPATIENT)
Dept: INTERNAL MEDICINE CLINIC | Facility: CLINIC | Age: 53
End: 2018-01-16

## 2018-01-16 VITALS
WEIGHT: 200 LBS | RESPIRATION RATE: 12 BRPM | DIASTOLIC BLOOD PRESSURE: 86 MMHG | SYSTOLIC BLOOD PRESSURE: 126 MMHG | BODY MASS INDEX: 34.15 KG/M2 | HEART RATE: 82 BPM | HEIGHT: 64 IN | TEMPERATURE: 98 F

## 2018-01-16 DIAGNOSIS — S06.9X0S TRAUMATIC BRAIN INJURY, WITHOUT LOSS OF CONSCIOUSNESS, SEQUELA (HCC): Primary | ICD-10-CM

## 2018-01-16 DIAGNOSIS — Z12.39 BREAST CANCER SCREENING: ICD-10-CM

## 2018-01-16 DIAGNOSIS — Z86.39 HISTORY OF PINEAL CYST: ICD-10-CM

## 2018-01-16 DIAGNOSIS — K64.9 HEMORRHOIDS, UNSPECIFIED HEMORRHOID TYPE: ICD-10-CM

## 2018-01-16 PROCEDURE — 99212 OFFICE O/P EST SF 10 MIN: CPT | Performed by: INTERNAL MEDICINE

## 2018-01-16 PROCEDURE — 99214 OFFICE O/P EST MOD 30 MIN: CPT | Performed by: INTERNAL MEDICINE

## 2018-01-16 RX ORDER — GABAPENTIN 300 MG/1
300 CAPSULE ORAL 2 TIMES DAILY
Qty: 180 CAPSULE | Refills: 0 | Status: SHIPPED | OUTPATIENT
Start: 2018-01-16 | End: 2018-04-24

## 2018-01-16 RX ORDER — GABAPENTIN 300 MG/1
300 CAPSULE ORAL 2 TIMES DAILY
COMMUNITY
End: 2018-01-16

## 2018-01-16 RX ORDER — AMLODIPINE BESYLATE 10 MG/1
10 TABLET ORAL NIGHTLY
COMMUNITY
End: 2018-02-27

## 2018-01-16 NOTE — PROGRESS NOTES
HPI:    Patient ID: Kat Vann is a 50year old female. Headache    This is a chronic problem. The current episode started more than 1 year ago. The problem occurs intermittently. The problem has been waxing and waning.  The pain is located in the bi BASE) MCG/ACT Inhalation Aero Soln Inhale 2 puffs into the lungs every 6 (six) hours as needed.  For wheezing Disp:  Rfl: 3     Allergies:  Penicillins             Anaphylaxis   PHYSICAL EXAM:   Physical Exam   Constitutional: She appears well-developed and mouth 2 (two) times daily.            Imaging & Referrals:  NEURO - INTERNAL  SURGERY - INTERNAL  EUNICE SCREENING BILAT ([id number])       [id number]

## 2018-01-22 RX ORDER — LOVASTATIN 20 MG/1
20 TABLET ORAL NIGHTLY
Qty: 30 TABLET | Refills: 0 | Status: SHIPPED | OUTPATIENT
Start: 2018-01-22 | End: 2018-02-27

## 2018-01-22 NOTE — TELEPHONE ENCOUNTER
Spoke with pharmacy, refill not on file but they are no longer contracted with her insurance.   LMTCB to inquire if pt is using a new pharmacy

## 2018-01-23 ENCOUNTER — OFFICE VISIT (OUTPATIENT)
Dept: NEUROLOGY | Facility: CLINIC | Age: 53
End: 2018-01-23

## 2018-01-23 VITALS
HEART RATE: 80 BPM | DIASTOLIC BLOOD PRESSURE: 76 MMHG | BODY MASS INDEX: 29.02 KG/M2 | SYSTOLIC BLOOD PRESSURE: 122 MMHG | RESPIRATION RATE: 16 BRPM | WEIGHT: 170 LBS | HEIGHT: 64 IN

## 2018-01-23 DIAGNOSIS — R40.4 EPISODIC ALTERED AWARENESS: ICD-10-CM

## 2018-01-23 DIAGNOSIS — R51.9 CHRONIC NONINTRACTABLE HEADACHE, UNSPECIFIED HEADACHE TYPE: Primary | ICD-10-CM

## 2018-01-23 DIAGNOSIS — Z86.39 HISTORY OF PINEAL CYST: ICD-10-CM

## 2018-01-23 DIAGNOSIS — G89.29 CHRONIC NONINTRACTABLE HEADACHE, UNSPECIFIED HEADACHE TYPE: Primary | ICD-10-CM

## 2018-01-23 DIAGNOSIS — S06.9X9S TRAUMATIC BRAIN INJURY WITH LOSS OF CONSCIOUSNESS, SEQUELA (HCC): ICD-10-CM

## 2018-01-23 PROCEDURE — 99213 OFFICE O/P EST LOW 20 MIN: CPT | Performed by: OTHER

## 2018-01-23 RX ORDER — BUTALBITAL, ACETAMINOPHEN AND CAFFEINE 50; 325; 40 MG/1; MG/1; MG/1
TABLET ORAL
Qty: 30 TABLET | Refills: 0 | OUTPATIENT
Start: 2018-01-23 | End: 2018-07-19

## 2018-01-23 RX ORDER — AMITRIPTYLINE HYDROCHLORIDE 25 MG/1
50 TABLET, FILM COATED ORAL NIGHTLY
Qty: 60 TABLET | Refills: 3 | Status: SHIPPED | OUTPATIENT
Start: 2018-01-23 | End: 2018-05-23

## 2018-01-23 RX ORDER — BUTALBITAL, ACETAMINOPHEN AND CAFFEINE 50; 325; 40 MG/1; MG/1; MG/1
TABLET ORAL
Qty: 30 TABLET | Refills: 0 | Status: SHIPPED | OUTPATIENT
Start: 2018-01-23 | End: 2018-01-23

## 2018-01-23 NOTE — PROGRESS NOTES
[facility]    Wandy Montes is a 50year old female. HPI:     Patient is being seen in follow-up. I saw her in clinic last 7/25/17. Her nephew accompanies her to the visit today.     She has had some fragmentation of her jerald needed. Disp:  Rfl: 0   VENTOLIN  (90 BASE) MCG/ACT Inhalation Aero Soln Inhale 2 puffs into the lungs every 6 (six) hours as needed.  For wheezing Disp:  Rfl: 3       Allergies:    Penicillins             Anaphylaxis      ROS:   GENERAL: no weight l in about 3 months (around 4/23/2018), sooner as needed    Ann Briones MD

## 2018-01-31 RX ORDER — LISINOPRIL 10 MG/1
10 TABLET ORAL 2 TIMES DAILY
Qty: 180 TABLET | Refills: 0 | Status: SHIPPED | OUTPATIENT
Start: 2018-01-31 | End: 2018-04-24

## 2018-01-31 NOTE — TELEPHONE ENCOUNTER
Hypertensive Medications  Protocol Criteria:  · Appointment scheduled in the past 6 months or in the next 3 months  · BMP or CMP in the past 12 months  · Creatinine result < 2  Recent Outpatient Visits            2 weeks ago Traumatic brain injury, without Bullous disease, CXR stable.  1. Repeat CXR in am.  Case discussed with Dr. Álvarez

## 2018-02-01 ENCOUNTER — TELEPHONE (OUTPATIENT)
Dept: NEUROLOGY | Facility: CLINIC | Age: 53
End: 2018-02-01

## 2018-02-05 RX ORDER — DIAZEPAM 5 MG/1
TABLET ORAL
Qty: 30 TABLET | Refills: 0 | OUTPATIENT
Start: 2018-02-05

## 2018-02-08 ENCOUNTER — HOSPITAL ENCOUNTER (OUTPATIENT)
Dept: MAMMOGRAPHY | Age: 53
Discharge: HOME | End: 2018-02-08
Attending: INTERNAL MEDICINE
Payer: MEDICAID

## 2018-02-08 DIAGNOSIS — Z12.39 BREAST CANCER SCREENING: ICD-10-CM

## 2018-02-08 PROCEDURE — 77067 SCR MAMMO BI INCL CAD: CPT | Performed by: INTERNAL MEDICINE

## 2018-02-09 NOTE — TELEPHONE ENCOUNTER
LOV: 1-16-18 Last Rx: 1-10-18    Please advise in regards to refill request. Thank You    Please respond to pool: ALEXIS PINA LPN/CHEKO

## 2018-02-10 RX ORDER — DIAZEPAM 5 MG/1
TABLET ORAL
Qty: 30 TABLET | Refills: 0 | OUTPATIENT
Start: 2018-02-10 | End: 2018-03-15

## 2018-02-13 NOTE — TELEPHONE ENCOUNTER
Kasey calling on refill status of script.     Verified script was signed and phoned in prescription to pharmacy

## 2018-02-27 RX ORDER — BUTALBITAL, ACETAMINOPHEN AND CAFFEINE 50; 325; 40 MG/1; MG/1; MG/1
CAPSULE ORAL
Qty: 30 CAPSULE | Refills: 0 | Status: SHIPPED | OUTPATIENT
Start: 2018-02-27 | End: 2018-03-15

## 2018-02-27 RX ORDER — LOVASTATIN 20 MG/1
TABLET ORAL
Qty: 30 TABLET | Refills: 0 | Status: SHIPPED | OUTPATIENT
Start: 2018-02-27 | End: 2018-03-27

## 2018-02-27 NOTE — TELEPHONE ENCOUNTER
Refill request for fioricet -40 mg, take 1 tab BID PRN, #30, no refills    LOV: 1/23/18  NOV: none  Last refilled on 1/23/18

## 2018-02-27 NOTE — TELEPHONE ENCOUNTER
Per pt she states she has been trying for a 1 month now to get a refill on this medication. She states the pharmacy has sent numerous attempts for a refill & that yesterday a pharmacist called our office. I seen no communication. pls advise.  Pt is complete

## 2018-02-27 NOTE — TELEPHONE ENCOUNTER
Hypertensive Medications  Protocol Criteria:  · Appointment scheduled in the past 6 months or in the next 3 months  · BMP or CMP in the past 12 months  · Creatinine result < 2  Recent Outpatient Visits            1 month ago Chronic nonintractable headache

## 2018-02-28 RX ORDER — AMLODIPINE BESYLATE 10 MG/1
10 TABLET ORAL NIGHTLY
Qty: 30 TABLET | Refills: 0 | Status: SHIPPED | OUTPATIENT
Start: 2018-02-28 | End: 2018-04-13

## 2018-02-28 NOTE — TELEPHONE ENCOUNTER
[address] [facility], [phone], [phone]   Medication Detail      Disp Refills Start End    AmLODIPine Besylate 10 MG Oral Tab 30 tablet 0 2/28/2018     Sig - Route:  [place]

## 2018-02-28 NOTE — TELEPHONE ENCOUNTER
[facility] calling checking status of refill. [facility] but states half tablet. Pt states she takes a full tablet. Please advise on current instructions.

## 2018-03-15 ENCOUNTER — TELEPHONE (OUTPATIENT)
Dept: INTERNAL MEDICINE CLINIC | Facility: CLINIC | Age: 53
End: 2018-03-15

## 2018-03-15 RX ORDER — DIAZEPAM 5 MG/1
TABLET ORAL
Qty: 30 TABLET | Refills: 0 | OUTPATIENT
Start: 2018-03-15 | End: 2018-04-17

## 2018-03-15 NOTE — TELEPHONE ENCOUNTER
Refill request for fioricet -40 mg, BID PRN, #30, no refills    LOV: 1/23/18  NOV: None  Last refilled on 2/27/18

## 2018-03-16 RX ORDER — BUTALBITAL, ACETAMINOPHEN AND CAFFEINE 50; 325; 40 MG/1; MG/1; MG/1
CAPSULE ORAL
Qty: 30 CAPSULE | Refills: 0 | OUTPATIENT
Start: 2018-03-16 | End: 2018-03-30

## 2018-03-19 NOTE — TELEPHONE ENCOUNTER
Approved refill by Dr. Irvin Black called to [address] for Valium 5 mg tab, qty # 30. Take on tab daily at bedtime. No additional refills.

## 2018-03-27 RX ORDER — LOVASTATIN 20 MG/1
TABLET ORAL
Qty: 30 TABLET | Refills: 0 | Status: SHIPPED | OUTPATIENT
Start: 2018-03-27 | End: 2018-04-24

## 2018-03-30 RX ORDER — BUTALBITAL, ACETAMINOPHEN AND CAFFEINE 50; 325; 40 MG/1; MG/1; MG/1
CAPSULE ORAL
Qty: 30 CAPSULE | Refills: 0 | Status: SHIPPED | OUTPATIENT
Start: 2018-03-30 | End: 2018-04-17

## 2018-03-30 NOTE — TELEPHONE ENCOUNTER
Medication request: Fiorcet -40 mg Take 1 capsule by mouth twice daily as needed.  Qt 30 Refills 0       HC88   NOV: Done    Last refill: 3/16/18

## 2018-04-14 RX ORDER — AMLODIPINE BESYLATE 10 MG/1
TABLET ORAL
Qty: 30 TABLET | Refills: 2 | Status: SHIPPED | OUTPATIENT
Start: 2018-04-14 | End: 2021-01-12

## 2018-04-14 NOTE — TELEPHONE ENCOUNTER
Refilled per protocol.   Hypertensive Medications  Protocol Criteria:  · Appointment scheduled in the past 6 months or in the next 3 months  · BMP or CMP in the past 12 months  · Creatinine result < 2  Recent Outpatient Visits            2 months ago Chroni

## 2018-04-18 RX ORDER — BUTALBITAL, ACETAMINOPHEN AND CAFFEINE 50; 325; 40 MG/1; MG/1; MG/1
CAPSULE ORAL
Qty: 30 CAPSULE | Refills: 0 | OUTPATIENT
Start: 2018-04-29 | End: 2018-05-23

## 2018-04-18 NOTE — TELEPHONE ENCOUNTER
Refill request for fioricet -40 mg, BID PRN, #30, no refills     LOV: 1/23/18  NOV: None  Last refilled on 3/30/18

## 2018-04-19 RX ORDER — DIAZEPAM 5 MG/1
TABLET ORAL
Qty: 30 TABLET | Refills: 0 | OUTPATIENT
Start: 2018-04-19 | End: 2018-05-23

## 2018-04-19 NOTE — TELEPHONE ENCOUNTER
LOV: 1/16/18 Last Rx: 3-15-18    Please advise in regards to refill request. Thank You    Please respond to pool: ALEXIS PINA LPN/CHEKO

## 2018-04-24 RX ORDER — GABAPENTIN 300 MG/1
CAPSULE ORAL
Qty: 180 CAPSULE | Refills: 0 | Status: SHIPPED | OUTPATIENT
Start: 2018-04-24 | End: 2018-05-23

## 2018-04-24 RX ORDER — LISINOPRIL 10 MG/1
TABLET ORAL
Qty: 180 TABLET | Refills: 0 | Status: SHIPPED | OUTPATIENT
Start: 2018-04-24 | End: 2018-05-23

## 2018-04-24 RX ORDER — LOVASTATIN 20 MG/1
TABLET ORAL
Qty: 30 TABLET | Refills: 0 | Status: SHIPPED | OUTPATIENT
Start: 2018-04-24 | End: 2018-05-23

## 2018-05-02 RX ORDER — BUTALBITAL, ACETAMINOPHEN AND CAFFEINE 50; 325; 40 MG/1; MG/1; MG/1
CAPSULE ORAL
Qty: 30 CAPSULE | Refills: 0 | OUTPATIENT
Start: 2018-05-02

## 2018-05-03 RX ORDER — BUTALBITAL, ACETAMINOPHEN AND CAFFEINE 50; 325; 40 MG/1; MG/1; MG/1
CAPSULE ORAL
Qty: 30 CAPSULE | Refills: 0 | OUTPATIENT
Start: 2018-05-03

## 2018-05-23 ENCOUNTER — TELEPHONE (OUTPATIENT)
Dept: NEUROLOGY | Facility: CLINIC | Age: 53
End: 2018-05-23

## 2018-05-23 RX ORDER — LISINOPRIL 10 MG/1
TABLET ORAL
Qty: 180 TABLET | Refills: 0 | Status: SHIPPED | OUTPATIENT
Start: 2018-05-23 | End: 2020-08-26

## 2018-05-23 RX ORDER — GABAPENTIN 300 MG/1
CAPSULE ORAL
Qty: 180 CAPSULE | Refills: 0 | Status: SHIPPED | OUTPATIENT
Start: 2018-05-23 | End: 2020-07-07

## 2018-05-23 RX ORDER — AMITRIPTYLINE HYDROCHLORIDE 25 MG/1
TABLET, FILM COATED ORAL
Qty: 60 TABLET | Refills: 0 | Status: SHIPPED | OUTPATIENT
Start: 2018-05-23 | End: 2018-06-19

## 2018-05-23 RX ORDER — LOVASTATIN 20 MG/1
TABLET ORAL
Qty: 30 TABLET | Refills: 0 | Status: SHIPPED | OUTPATIENT
Start: 2018-05-23 | End: 2018-06-19

## 2018-05-23 NOTE — TELEPHONE ENCOUNTER
Hypertensive Medications Please advise on 3 pended Rx's LOV related to other Dx than HTN and cholesterol and LDL level greater than protocol criteria.   Protocol Criteria:  · Appointment scheduled in the past 6 months or in the next 3 months  · BMP or CMP [address], [facility] Oviedo MD    Office Visit    4 months ago Traumatic brain injury, without loss of consciousness, sequela [facility])    Awa Gonzalez, Sammye Claude, MD    Office Visit    10 months ago Chronic nonin

## 2018-05-24 RX ORDER — DIAZEPAM 5 MG/1
TABLET ORAL
Qty: 30 TABLET | Refills: 0 | OUTPATIENT
Start: 2018-05-24 | End: 2018-06-19

## 2018-05-24 NOTE — TELEPHONE ENCOUNTER
Refill request for fioricet -40 mg, BID PRN, #30, no refills    LOV: 1/23/18  NOV: none  Last refilled on 4/29/18

## 2018-05-25 RX ORDER — BUTALBITAL, ACETAMINOPHEN AND CAFFEINE 50; 325; 40 MG/1; MG/1; MG/1
CAPSULE ORAL
Qty: 30 CAPSULE | Refills: 0 | OUTPATIENT
Start: 2018-05-29 | End: 2020-06-05

## 2018-06-20 RX ORDER — AMITRIPTYLINE HYDROCHLORIDE 25 MG/1
TABLET, FILM COATED ORAL
Qty: 60 TABLET | Refills: 3 | Status: SHIPPED | OUTPATIENT
Start: 2018-06-20 | End: 2020-09-23

## 2018-06-20 NOTE — TELEPHONE ENCOUNTER
Refill request for amitriptyline 25 mg, take 2 caps nightly, #60, 3 refills    LOV: 1/23/18  NOV: none

## 2018-06-21 RX ORDER — LOVASTATIN 20 MG/1
TABLET ORAL
Qty: 30 TABLET | Refills: 0 | Status: SHIPPED | OUTPATIENT
Start: 2018-06-21 | End: 2018-07-18

## 2018-06-21 RX ORDER — DIAZEPAM 5 MG/1
TABLET ORAL
Qty: 30 TABLET | Refills: 0 | Status: SHIPPED
Start: 2018-06-21 | End: 2018-07-18

## 2018-06-21 NOTE — TELEPHONE ENCOUNTER
6/21/18 Diazepam 5 mg called to [facility] listed in chart per voice mail,with instructions that patient needs to schedule office visit before furthur refills.

## 2018-07-19 RX ORDER — BUTALBITAL, ACETAMINOPHEN AND CAFFEINE 50; 325; 40 MG/1; MG/1; MG/1
TABLET ORAL
Qty: 30 TABLET | Refills: 0 | Status: SHIPPED | OUTPATIENT
Start: 2018-07-19 | End: 2020-06-05

## 2018-07-19 NOTE — TELEPHONE ENCOUNTER
Medication request: Butalbital-apap-caffeine -40. Take 1 tablet by mouth twice daily as needed. #30. No refills. [facility]-[id number]  NOV-none scheduled    Last refill: 5/24/2018    Order pended.     Will need to make follow up appointment per Dr. Jer Townsend

## 2018-07-23 RX ORDER — DIAZEPAM 5 MG/1
TABLET ORAL
Qty: 30 TABLET | Refills: 0 | OUTPATIENT
Start: 2018-07-23 | End: 2020-06-05

## 2018-07-23 RX ORDER — LOVASTATIN 20 MG/1
TABLET ORAL
Qty: 30 TABLET | Refills: 0 | Status: SHIPPED | OUTPATIENT
Start: 2018-07-23 | End: 2021-01-12

## 2020-06-01 ENCOUNTER — TELEPHONE (OUTPATIENT)
Dept: NEUROLOGY | Facility: CLINIC | Age: 55
End: 2020-06-01

## 2020-06-02 ENCOUNTER — TELEPHONE (OUTPATIENT)
Dept: INTERNAL MEDICINE CLINIC | Facility: CLINIC | Age: 55
End: 2020-06-02

## 2020-06-02 DIAGNOSIS — Z12.39 BREAST CANCER SCREENING: Primary | ICD-10-CM

## 2020-06-02 NOTE — TELEPHONE ENCOUNTER
Previous Mammogram 1/16/18    =====  CONCLUSION:     BIRADS Code 2: BENIGN FINDING. RECOMMENDATIONS:    FOLLOW-UP: Routine screening follow-up recommended. pls advise on new order.

## 2020-06-02 NOTE — TELEPHONE ENCOUNTER
S/W patient and advised order for mammogram is in the system. Instructed patient to call [facility] to make appointment. Scheduling phone number give and repeated back correctly by patient.

## 2020-06-05 ENCOUNTER — TELEPHONE (OUTPATIENT)
Dept: INTERNAL MEDICINE CLINIC | Facility: CLINIC | Age: 55
End: 2020-06-05

## 2020-06-05 ENCOUNTER — LAB ENCOUNTER (OUTPATIENT)
Dept: LAB | Age: 55
End: 2020-06-05
Attending: INTERNAL MEDICINE
Payer: MEDICAID

## 2020-06-05 ENCOUNTER — OFFICE VISIT (OUTPATIENT)
Dept: INTERNAL MEDICINE CLINIC | Facility: CLINIC | Age: 55
End: 2020-06-05
Payer: MEDICAID

## 2020-06-05 VITALS
BODY MASS INDEX: 40.12 KG/M2 | DIASTOLIC BLOOD PRESSURE: 78 MMHG | HEIGHT: 64 IN | RESPIRATION RATE: 12 BRPM | HEART RATE: 89 BPM | WEIGHT: 235 LBS | SYSTOLIC BLOOD PRESSURE: 111 MMHG | TEMPERATURE: 98 F

## 2020-06-05 DIAGNOSIS — E78.2 MIXED HYPERLIPIDEMIA: ICD-10-CM

## 2020-06-05 DIAGNOSIS — I10 ESSENTIAL HYPERTENSION WITH GOAL BLOOD PRESSURE LESS THAN 140/90: ICD-10-CM

## 2020-06-05 DIAGNOSIS — I10 ESSENTIAL HYPERTENSION WITH GOAL BLOOD PRESSURE LESS THAN 140/90: Primary | ICD-10-CM

## 2020-06-05 DIAGNOSIS — G89.29 CHRONIC NONINTRACTABLE HEADACHE, UNSPECIFIED HEADACHE TYPE: ICD-10-CM

## 2020-06-05 DIAGNOSIS — F41.1 GAD (GENERALIZED ANXIETY DISORDER): ICD-10-CM

## 2020-06-05 DIAGNOSIS — R51.9 CHRONIC NONINTRACTABLE HEADACHE, UNSPECIFIED HEADACHE TYPE: ICD-10-CM

## 2020-06-05 DIAGNOSIS — Z86.39 HISTORY OF PINEAL CYST: ICD-10-CM

## 2020-06-05 PROCEDURE — 36415 COLL VENOUS BLD VENIPUNCTURE: CPT

## 2020-06-05 PROCEDURE — 80061 LIPID PANEL: CPT

## 2020-06-05 PROCEDURE — 99214 OFFICE O/P EST MOD 30 MIN: CPT | Performed by: INTERNAL MEDICINE

## 2020-06-05 PROCEDURE — 85025 COMPLETE CBC W/AUTO DIFF WBC: CPT

## 2020-06-05 PROCEDURE — 80053 COMPREHEN METABOLIC PANEL: CPT

## 2020-06-05 RX ORDER — BUTALBITAL, ACETAMINOPHEN AND CAFFEINE 50; 325; 40 MG/1; MG/1; MG/1
1 CAPSULE ORAL 2 TIMES DAILY PRN
Qty: 30 CAPSULE | Refills: 0 | Status: SHIPPED | OUTPATIENT
Start: 2020-06-05 | End: 2021-03-30

## 2020-06-05 RX ORDER — DIAZEPAM 5 MG/1
5 TABLET ORAL EVERY 12 HOURS PRN
Qty: 60 TABLET | Refills: 0 | Status: SHIPPED | OUTPATIENT
Start: 2020-06-05 | End: 2020-07-07

## 2020-06-05 RX ORDER — ERGOCALCIFEROL 1.25 MG/1
CAPSULE ORAL
COMMUNITY
End: 2021-01-12 | Stop reason: ALTCHOICE

## 2020-06-05 RX ORDER — MIRTAZAPINE 15 MG/1
15 TABLET, FILM COATED ORAL NIGHTLY
COMMUNITY
End: 2020-06-29

## 2020-06-05 RX ORDER — BUTALBITAL, ACETAMINOPHEN AND CAFFEINE 50; 325; 40 MG/1; MG/1; MG/1
1 TABLET ORAL 2 TIMES DAILY PRN
Qty: 30 TABLET | Refills: 0 | Status: SHIPPED | OUTPATIENT
Start: 2020-06-05 | End: 2020-07-07

## 2020-06-05 NOTE — TELEPHONE ENCOUNTER
Patient states script printed for Butalbital is for caps. Needs to be for tabs or, patient states the pharmacy won't give it to her. Patient is waiting.

## 2020-06-07 NOTE — PROGRESS NOTES
HPI:    Patient ID: Melanie Santos is a 48year old female. Hypertension   This is a chronic problem. The current episode started more than 1 year ago. The problem has been gradually improving since onset. The problem is controlled.  Associated symptoms treatment provided moderate relief. Review of Systems   Constitutional: Negative. Respiratory: Negative. Negative for shortness of breath. Cardiovascular: Negative. Negative for chest pain. Gastrointestinal: Negative.     Genitourinary: Tye Majestic well-developed. No distress. HENT:   Right Ear: External ear normal.   Left Ear: External ear normal.   Eyes: Conjunctivae are normal. Right eye exhibits no discharge. Left eye exhibits no discharge. No scleral icterus. Neck: Neck supple.  No JVD presen Oral Tab 60 tablet 0     Sig: Take 1 tablet (5 mg total) by mouth every 12 (twelve) hours as needed for Anxiety. • Butalbital-APAP-Caffeine -40 MG Oral Tab 30 tablet 0     Sig: Take 1 tablet by mouth 2 (two) times daily as needed.        Imaging & R

## 2020-06-20 ENCOUNTER — HOSPITAL ENCOUNTER (EMERGENCY)
Facility: HOSPITAL | Age: 55
Discharge: HOME | End: 2020-06-20
Attending: EMERGENCY MEDICINE
Payer: MEDICAID

## 2020-06-20 ENCOUNTER — HOSPITAL ENCOUNTER (OUTPATIENT)
Dept: MAMMOGRAPHY | Facility: HOSPITAL | Age: 55
Discharge: HOME | End: 2020-06-20
Attending: INTERNAL MEDICINE
Payer: MEDICAID

## 2020-06-20 VITALS
SYSTOLIC BLOOD PRESSURE: 134 MMHG | TEMPERATURE: 98 F | HEIGHT: 66 IN | DIASTOLIC BLOOD PRESSURE: 67 MMHG | OXYGEN SATURATION: 99 % | HEART RATE: 78 BPM | BODY MASS INDEX: 37.56 KG/M2 | RESPIRATION RATE: 20 BRPM | WEIGHT: 233.69 LBS

## 2020-06-20 DIAGNOSIS — Z12.39 BREAST CANCER SCREENING: ICD-10-CM

## 2020-06-20 DIAGNOSIS — S05.02XA ABRASION OF LEFT CORNEA, INITIAL ENCOUNTER: Primary | ICD-10-CM

## 2020-06-20 PROCEDURE — 77067 SCR MAMMO BI INCL CAD: CPT | Performed by: INTERNAL MEDICINE

## 2020-06-20 PROCEDURE — 99283 EMERGENCY DEPT VISIT LOW MDM: CPT

## 2020-06-20 PROCEDURE — 77063 BREAST TOMOSYNTHESIS BI: CPT | Performed by: INTERNAL MEDICINE

## 2020-06-20 RX ORDER — ERYTHROMYCIN 5 MG/G
1 OINTMENT OPHTHALMIC
Qty: 1 TUBE | Refills: 1 | Status: SHIPPED | OUTPATIENT
Start: 2020-06-20 | End: 2020-06-27

## 2020-06-20 RX ORDER — TETRACAINE HYDROCHLORIDE 5 MG/ML
1-2 SOLUTION OPHTHALMIC ONCE
Status: COMPLETED | OUTPATIENT
Start: 2020-06-20 | End: 2020-06-20

## 2020-06-20 NOTE — ED PROVIDER NOTES
Patient Seen in: [facility] Emergency Department      History   Patient presents with:   Eye Visual Problem    Stated Complaint: L eye pain, baby scratched cornea     HPI    This is a 53-year-old female with past medical history of migraines, hyperte (Tympanic)   Resp 20   Ht 167.6 cm (5' 6\")   Wt 106 kg   SpO2 99%   BMI 37.72 kg/m²         Physical Exam    HEENT: 3 to 4 mm left eye corneal abrasion at 3:00. No evidence of foreign body. Conjunctiva was injected. Pupils equal reactive to light.   Ext

## 2020-06-29 NOTE — TELEPHONE ENCOUNTER
Pharmacy called stating patient is requesting a refill of medication.    Per pharmacy, patient receives this medication from an outside physician but was asking refill request be sent to Dr. Viridiana Anne  Refill Protocol Appointment Criteria  · Appointment sc

## 2020-07-01 ENCOUNTER — TELEPHONE (OUTPATIENT)
Dept: INTERNAL MEDICINE CLINIC | Facility: CLINIC | Age: 55
End: 2020-07-01

## 2020-07-01 NOTE — TELEPHONE ENCOUNTER
Patient needs a refill on          mirtazapine 15 MG Oral Tab           Summary: Take 15 mg by mouth nightly        Thank you.

## 2020-07-02 RX ORDER — MIRTAZAPINE 15 MG/1
15 TABLET, FILM COATED ORAL NIGHTLY
Qty: 30 TABLET | Refills: 2 | Status: SHIPPED | OUTPATIENT
Start: 2020-07-02 | End: 2020-09-22

## 2020-07-07 ENCOUNTER — TELEPHONE (OUTPATIENT)
Dept: NEUROLOGY | Facility: CLINIC | Age: 55
End: 2020-07-07

## 2020-07-07 ENCOUNTER — OFFICE VISIT (OUTPATIENT)
Dept: NEUROLOGY | Facility: CLINIC | Age: 55
End: 2020-07-07
Payer: MEDICAID

## 2020-07-07 VITALS
WEIGHT: 200 LBS | HEIGHT: 64 IN | BODY MASS INDEX: 34.15 KG/M2 | SYSTOLIC BLOOD PRESSURE: 118 MMHG | DIASTOLIC BLOOD PRESSURE: 60 MMHG

## 2020-07-07 DIAGNOSIS — G44.329 CHRONIC POST-TRAUMATIC HEADACHE, NOT INTRACTABLE: Primary | ICD-10-CM

## 2020-07-07 DIAGNOSIS — Z86.39 HISTORY OF PINEAL CYST: ICD-10-CM

## 2020-07-07 PROCEDURE — 99214 OFFICE O/P EST MOD 30 MIN: CPT | Performed by: OTHER

## 2020-07-07 RX ORDER — BUTALBITAL, ACETAMINOPHEN AND CAFFEINE 50; 325; 40 MG/1; MG/1; MG/1
1 TABLET ORAL 2 TIMES DAILY PRN
Qty: 60 TABLET | Refills: 2 | Status: SHIPPED | OUTPATIENT
Start: 2020-07-07 | End: 2020-09-25

## 2020-07-07 RX ORDER — GABAPENTIN 300 MG/1
300 CAPSULE ORAL 2 TIMES DAILY
Qty: 180 CAPSULE | Refills: 1 | Status: SHIPPED | OUTPATIENT
Start: 2020-07-07 | End: 2020-08-04

## 2020-07-07 RX ORDER — DIAZEPAM 5 MG/1
5 TABLET ORAL EVERY 12 HOURS PRN
Qty: 60 TABLET | Refills: 2 | Status: SHIPPED | OUTPATIENT
Start: 2020-07-07 | End: 2020-09-25
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
## 2020-07-07 NOTE — PROGRESS NOTES
HPI:    Patient ID: Kat Vann is a 48year old female. PCP: Dr Tarik Dennis    HPI   Kat Vann is a 48year old female who presented to establish care with me for post traumatic headaches and seizure like spells.  She had a severe traumatic brain i Negative. Eyes: Negative. Respiratory: Negative. Cardiovascular: Negative. Gastrointestinal: Negative. Endocrine: Negative. Genitourinary: Negative. Musculoskeletal: Negative. Allergic/Immunologic: Negative.     Neurological: Positiv breastfeeding. General: sitting comfortably, co-operative  Head: Normocephalic and atraumatic. Normal sclera. Cardiovascular: Normal rate, regular rhythm and normal heart sounds.     Pulmonary/Chest: Effort normal and breath sounds normal.   Abdominal: these issues and agrees with the plan.         No orders of the defined types were placed in this encounter.       Meds This Visit:  Requested Prescriptions     Signed Prescriptions Disp Refills   • gabapentin 300 MG Oral Cap 180 capsule 1     Sig: Take 1 c

## 2020-08-04 ENCOUNTER — TELEPHONE (OUTPATIENT)
Dept: NEUROLOGY | Facility: CLINIC | Age: 55
End: 2020-08-04

## 2020-08-04 RX ORDER — GABAPENTIN 300 MG/1
1200 CAPSULE ORAL 3 TIMES DAILY
Qty: 360 CAPSULE | Refills: 1 | Status: CANCELLED | OUTPATIENT
Start: 2020-08-04

## 2020-08-04 NOTE — TELEPHONE ENCOUNTER
Patient states she is taking gabapentin 1200 mg TID     Refill request for gabapentin 300 mg, 4 po TID, 360, 1 refill

## 2020-08-11 NOTE — TELEPHONE ENCOUNTER
Attempted to reach patient - unable to leave message as mailbox is full. Will try reaching patient again at later time.

## 2020-08-17 RX ORDER — GABAPENTIN 300 MG/1
300 CAPSULE ORAL 4 TIMES DAILY
Qty: 120 CAPSULE | Refills: 0 | Status: SHIPPED | OUTPATIENT
Start: 2020-08-17 | End: 2020-09-18

## 2020-08-17 NOTE — TELEPHONE ENCOUNTER
Per Epic review gabapentin 300 mg BID ordered by Dr Kristi Swartz 5/23/18. I spoke to patient, states she has had refills since then from Dr Matias Villalobos for 300 mg 1 tab QID for total 1200 mg daily. Insurance has now changed and now seeing Dr Latisha Mejia.    Navdeep

## 2020-08-26 RX ORDER — LISINOPRIL 10 MG/1
10 TABLET ORAL 2 TIMES DAILY
Qty: 180 TABLET | Refills: 1 | Status: SHIPPED | OUTPATIENT
Start: 2020-08-26 | End: 2021-02-09

## 2020-09-03 ENCOUNTER — HOSPITAL ENCOUNTER (OUTPATIENT)
Dept: MRI IMAGING | Facility: HOSPITAL | Age: 55
Discharge: HOME | End: 2020-09-03
Attending: Other
Payer: MEDICAID

## 2020-09-03 DIAGNOSIS — Z86.39 HISTORY OF PINEAL CYST: ICD-10-CM

## 2020-09-03 PROCEDURE — 70553 MRI BRAIN STEM W/O & W/DYE: CPT | Performed by: OTHER

## 2020-09-03 PROCEDURE — A9575 INJ GADOTERATE MEGLUMI 0.1ML: HCPCS | Performed by: OTHER

## 2020-09-15 ENCOUNTER — TELEPHONE (OUTPATIENT)
Dept: NEUROLOGY | Facility: CLINIC | Age: 55
End: 2020-09-15

## 2020-09-15 NOTE — TELEPHONE ENCOUNTER
MRI results left on VM (ok per HIPAA consent).     ----- Message from Tian Mariano MD sent at 9/8/2020  7:20 PM CDT -----  Unchanged stable pineal gland cyst

## 2020-09-18 ENCOUNTER — TELEPHONE (OUTPATIENT)
Dept: INTERNAL MEDICINE CLINIC | Facility: CLINIC | Age: 55
End: 2020-09-18

## 2020-09-18 DIAGNOSIS — E55.9 VITAMIN D DEFICIENCY: Primary | ICD-10-CM

## 2020-09-18 RX ORDER — GABAPENTIN 300 MG/1
CAPSULE ORAL
Qty: 120 CAPSULE | Refills: 0 | Status: SHIPPED | OUTPATIENT
Start: 2020-09-18 | End: 2020-10-13

## 2020-09-18 NOTE — TELEPHONE ENCOUNTER
Medication request: Gabapentin 300mg 1 CAP QID    LOV: 07/07/20  NOV: none    [place] #120 r-0

## 2020-09-19 NOTE — TELEPHONE ENCOUNTER
Advised patient of Dr. Hailey Bolton note. Patient verbalized understanding and already has number to scheduling.

## 2020-09-22 ENCOUNTER — TELEPHONE (OUTPATIENT)
Dept: INTERNAL MEDICINE CLINIC | Facility: CLINIC | Age: 55
End: 2020-09-22

## 2020-09-22 RX ORDER — MIRTAZAPINE 15 MG/1
15 TABLET, FILM COATED ORAL NIGHTLY
Qty: 30 TABLET | Refills: 2 | Status: SHIPPED | OUTPATIENT
Start: 2020-09-22 | End: 2020-12-15

## 2020-09-22 RX ORDER — MIRTAZAPINE 15 MG/1
TABLET, FILM COATED ORAL
Qty: 30 TABLET | Refills: 0 | OUTPATIENT
Start: 2020-09-22

## 2020-09-22 NOTE — TELEPHONE ENCOUNTER
Patient has scheduled appointment for 9/30  She takes the mirtazipam for sleep 1 tab nightly. She will be out of medication in 2 days. 30 day rx pended for approval.  Pharmacy confirmed.

## 2020-09-23 ENCOUNTER — TELEPHONE (OUTPATIENT)
Dept: NEUROLOGY | Facility: CLINIC | Age: 55
End: 2020-09-23

## 2020-09-23 RX ORDER — AMITRIPTYLINE HYDROCHLORIDE 25 MG/1
50 TABLET, FILM COATED ORAL NIGHTLY
Qty: 60 TABLET | Refills: 3 | Status: SHIPPED | OUTPATIENT
Start: 2020-09-23 | End: 2021-01-16

## 2020-09-23 NOTE — TELEPHONE ENCOUNTER
Pt states that she needs a refill. I advised her to f/u with the neurologist has it looks like they last refilled this for her (in 2018). Please advise.

## 2020-09-23 NOTE — TELEPHONE ENCOUNTER
Pt is requesting amitriptyline - was last prescribed by Dr. Saida Nicholas MD 6/1/2020 [place] internist [place] w/ refills until 8/26/20. Dr. Lily Mclain prescribed medication in 6/20/2018.      Please advise if you would like to continue prescribing med

## 2020-09-24 RX ORDER — AMITRIPTYLINE HYDROCHLORIDE 25 MG/1
50 TABLET, FILM COATED ORAL NIGHTLY
Qty: 60 TABLET | Refills: 3 | Status: SHIPPED | OUTPATIENT
Start: 2020-09-24 | End: 2021-01-16

## 2020-09-24 NOTE — TELEPHONE ENCOUNTER
I am sending the script to [address] [place] [address] Amitriptyline 25 mg tab- take 2 tabs at night ( chart reviewed ordered it the same way as Dr Willis Schneider did)

## 2020-09-24 NOTE — TELEPHONE ENCOUNTER
Patient states Dr. Mini Hinojosa prescribed her Amitriptyline for anxiety and to use in combination with the other medications to help her sleep. Patient also called Dr. Aniceto Mtz office because she was confused if it was him or  Dr. Mini Hinojosa that prescribed.

## 2020-09-24 NOTE — TELEPHONE ENCOUNTER
I saw the patient on 7/7 and at that time she did not mentioned about Amitriptyline. I gave the script for Gabapentin, valium and Fioricet ( the one that Dr Lily Mclain prescribed on last visit)  Why is she on Amitriptyline? Thanks!

## 2020-09-25 ENCOUNTER — LAB ENCOUNTER (OUTPATIENT)
Dept: LAB | Facility: HOSPITAL | Age: 55
End: 2020-09-25
Attending: INTERNAL MEDICINE
Payer: MEDICAID

## 2020-09-25 DIAGNOSIS — E55.9 VITAMIN D DEFICIENCY: ICD-10-CM

## 2020-09-25 LAB — VIT D+METAB SERPL-MCNC: 41.1 NG/ML (ref 30–100)

## 2020-09-25 PROCEDURE — 82306 VITAMIN D 25 HYDROXY: CPT

## 2020-09-25 PROCEDURE — 36415 COLL VENOUS BLD VENIPUNCTURE: CPT

## 2020-09-25 RX ORDER — DIAZEPAM 5 MG/1
TABLET ORAL
Qty: 60 TABLET | Refills: 2 | Status: SHIPPED | OUTPATIENT
Start: 2020-09-25 | End: 2020-12-15

## 2020-09-25 RX ORDER — BUTALBITAL, ACETAMINOPHEN AND CAFFEINE 50; 325; 40 MG/1; MG/1; MG/1
TABLET ORAL
Qty: 60 TABLET | Refills: 2 | Status: SHIPPED | OUTPATIENT
Start: 2020-09-25 | End: 2020-12-15

## 2020-09-25 NOTE — TELEPHONE ENCOUNTER
Refill request for diazepam 5 mg, BID PRN, #60, 2 refills -- [place]     Refill request for fioricet 50/325 mg, BID PRN, #60, 2 refills -- [place]     LOV: 7/7/20  NOV: None

## 2020-10-06 ENCOUNTER — OFFICE VISIT (OUTPATIENT)
Dept: INTERNAL MEDICINE CLINIC | Facility: CLINIC | Age: 55
End: 2020-10-06
Payer: MEDICAID

## 2020-10-06 VITALS
HEIGHT: 64 IN | BODY MASS INDEX: 38.07 KG/M2 | DIASTOLIC BLOOD PRESSURE: 83 MMHG | SYSTOLIC BLOOD PRESSURE: 115 MMHG | RESPIRATION RATE: 12 BRPM | HEART RATE: 88 BPM | WEIGHT: 223 LBS | TEMPERATURE: 98 F

## 2020-10-06 DIAGNOSIS — I10 ESSENTIAL HYPERTENSION WITH GOAL BLOOD PRESSURE LESS THAN 140/90: ICD-10-CM

## 2020-10-06 DIAGNOSIS — E55.9 VITAMIN D DEFICIENCY: Primary | ICD-10-CM

## 2020-10-06 DIAGNOSIS — F41.1 GAD (GENERALIZED ANXIETY DISORDER): ICD-10-CM

## 2020-10-06 DIAGNOSIS — Z86.39 HISTORY OF PINEAL CYST: ICD-10-CM

## 2020-10-06 DIAGNOSIS — E78.2 MIXED HYPERLIPIDEMIA: ICD-10-CM

## 2020-10-06 PROCEDURE — 3008F BODY MASS INDEX DOCD: CPT | Performed by: INTERNAL MEDICINE

## 2020-10-06 PROCEDURE — 99214 OFFICE O/P EST MOD 30 MIN: CPT | Performed by: INTERNAL MEDICINE

## 2020-10-06 PROCEDURE — 3079F DIAST BP 80-89 MM HG: CPT | Performed by: INTERNAL MEDICINE

## 2020-10-06 PROCEDURE — 3074F SYST BP LT 130 MM HG: CPT | Performed by: INTERNAL MEDICINE

## 2020-10-06 RX ORDER — MELATONIN
1000 DAILY
Qty: 90 TABLET | Refills: 1 | Status: SHIPPED | OUTPATIENT
Start: 2020-10-06 | End: 2020-11-05

## 2020-10-06 NOTE — PROGRESS NOTES
HPI:    Patient ID: Karina Albert is a 50year old female. Patient presents today for ffup regarding her vit D deficiency. I had treated pt with vit D 50K weekly for 12 weeks to correct her vit D deficit. Her recent vit D level is now in normal range. capsule by mouth 2 (two) times daily as needed.  30 capsule 0   • LOVASTATIN 20 MG Oral Tab TAKE 1 TABLET BY MOUTH AT NIGHT 30 tablet 0   • AMLODIPINE BESYLATE 10 MG Oral Tab TAKE 1 TABLET(10 MG) BY MOUTH EVERY NIGHT 30 tablet 2   • VENTOLIN  (90 BAS There is no rebound and no guarding. Musculoskeletal: Normal range of motion. General: No tenderness or edema. Lymphadenopathy:     She has no cervical adenopathy. Neurological: She is alert and oriented to person, place, and time.    Skin: Sk

## 2020-10-07 PROBLEM — E55.9 VITAMIN D DEFICIENCY: Status: ACTIVE | Noted: 2020-10-07

## 2020-10-13 RX ORDER — GABAPENTIN 300 MG/1
CAPSULE ORAL
Qty: 120 CAPSULE | Refills: 0 | Status: SHIPPED | OUTPATIENT
Start: 2020-10-13 | End: 2021-01-12

## 2020-10-13 NOTE — TELEPHONE ENCOUNTER
Refill request for gabapentin 300 mg, QID, #120, no refills    LOV: 7/7/20  NOV: none  Last refilled on 9/18/20

## 2020-12-15 RX ORDER — BUTALBITAL, ACETAMINOPHEN AND CAFFEINE 50; 325; 40 MG/1; MG/1; MG/1
1 TABLET ORAL 2 TIMES DAILY PRN
Qty: 60 TABLET | Refills: 2 | Status: SHIPPED | OUTPATIENT
Start: 2020-12-15 | End: 2021-03-30

## 2020-12-15 RX ORDER — MIRTAZAPINE 15 MG/1
15 TABLET, FILM COATED ORAL NIGHTLY
Qty: 30 TABLET | Refills: 2 | Status: SHIPPED | OUTPATIENT
Start: 2020-12-15 | End: 2021-04-08

## 2020-12-15 RX ORDER — BUTALBITAL, ACETAMINOPHEN AND CAFFEINE 50; 325; 40 MG/1; MG/1; MG/1
TABLET ORAL
Qty: 60 TABLET | Refills: 0 | OUTPATIENT
Start: 2020-12-15

## 2020-12-15 RX ORDER — DIAZEPAM 5 MG/1
TABLET ORAL
Qty: 60 TABLET | Refills: 0 | OUTPATIENT
Start: 2020-12-15

## 2020-12-15 RX ORDER — DIAZEPAM 5 MG/1
5 TABLET ORAL EVERY 12 HOURS PRN
Qty: 60 TABLET | Refills: 2 | Status: SHIPPED | OUTPATIENT
Start: 2020-12-15 | End: 2021-03-30

## 2020-12-15 NOTE — TELEPHONE ENCOUNTER
chantei per patient her medication bottle says NO refills on it. She checked with the pharmacy already and even the pharmacy said it has no refills on it. Please call Angie Harris. She needs her medication.         Thanks

## 2020-12-15 NOTE — TELEPHONE ENCOUNTER
Refill request for fioricet -40 mg, BID PRN, #60, 2 refills    Refill request for diazepam 5 mg, BID PRN, #60, 2 refills    LOV: 7/7/20  NOV: None  Both were refilled on 11/20/20 per pharmacy

## 2020-12-15 NOTE — TELEPHONE ENCOUNTER
[place] fioricet and diazepam was on 9/26/20. Patient has 2 additional refills on medications.

## 2021-01-08 ENCOUNTER — TELEPHONE (OUTPATIENT)
Dept: INTERNAL MEDICINE CLINIC | Facility: CLINIC | Age: 56
End: 2021-01-08

## 2021-01-08 DIAGNOSIS — Z00.00 ANNUAL PHYSICAL EXAM: Primary | ICD-10-CM

## 2021-01-08 NOTE — TELEPHONE ENCOUNTER
Dr. Reji Mahmood, patient is schedule for a Physical on 01/21/2021. Patient is requesting blood test order for appointment. Please advise.

## 2021-01-09 ENCOUNTER — LAB ENCOUNTER (OUTPATIENT)
Dept: LAB | Facility: HOSPITAL | Age: 56
End: 2021-01-09
Attending: INTERNAL MEDICINE
Payer: MEDICAID

## 2021-01-09 DIAGNOSIS — Z00.00 ANNUAL PHYSICAL EXAM: ICD-10-CM

## 2021-01-09 LAB
ALBUMIN SERPL-MCNC: 3.9 G/DL (ref 3.4–5)
ALBUMIN/GLOB SERPL: 1 {RATIO} (ref 1–2)
ALP LIVER SERPL-CCNC: 124 U/L
ALT SERPL-CCNC: 20 U/L
ANION GAP SERPL CALC-SCNC: 8 MMOL/L (ref 0–18)
AST SERPL-CCNC: 13 U/L (ref 15–37)
BASOPHILS # BLD AUTO: 0.04 X10(3) UL (ref 0–0.2)
BASOPHILS NFR BLD AUTO: 0.5 %
BILIRUB SERPL-MCNC: 0.4 MG/DL (ref 0.1–2)
BUN BLD-MCNC: 16 MG/DL (ref 7–18)
BUN/CREAT SERPL: 15.8 (ref 10–20)
CALCIUM BLD-MCNC: 10.5 MG/DL (ref 8.5–10.1)
CHLORIDE SERPL-SCNC: 108 MMOL/L (ref 98–112)
CHOLEST SMN-MCNC: 229 MG/DL (ref ?–200)
CO2 SERPL-SCNC: 24 MMOL/L (ref 21–32)
CREAT BLD-MCNC: 1.01 MG/DL
DEPRECATED RDW RBC AUTO: 44.4 FL (ref 35.1–46.3)
EOSINOPHIL # BLD AUTO: 0.12 X10(3) UL (ref 0–0.7)
EOSINOPHIL NFR BLD AUTO: 1.5 %
ERYTHROCYTE [DISTWIDTH] IN BLOOD BY AUTOMATED COUNT: 13.6 % (ref 11–15)
GLOBULIN PLAS-MCNC: 4 G/DL (ref 2.8–4.4)
GLUCOSE BLD-MCNC: 103 MG/DL (ref 70–99)
HCT VFR BLD AUTO: 44.4 %
HDLC SERPL-MCNC: 53 MG/DL (ref 40–59)
HGB BLD-MCNC: 14.6 G/DL
IMM GRANULOCYTES # BLD AUTO: 0.02 X10(3) UL (ref 0–1)
IMM GRANULOCYTES NFR BLD: 0.2 %
LDLC SERPL CALC-MCNC: 163 MG/DL (ref ?–100)
LYMPHOCYTES # BLD AUTO: 3.74 X10(3) UL (ref 1–4)
LYMPHOCYTES NFR BLD AUTO: 45.7 %
M PROTEIN MFR SERPL ELPH: 7.9 G/DL (ref 6.4–8.2)
MCH RBC QN AUTO: 29.5 PG (ref 26–34)
MCHC RBC AUTO-ENTMCNC: 32.9 G/DL (ref 31–37)
MCV RBC AUTO: 89.7 FL
MONOCYTES # BLD AUTO: 0.48 X10(3) UL (ref 0.1–1)
MONOCYTES NFR BLD AUTO: 5.9 %
NEUTROPHILS # BLD AUTO: 3.79 X10 (3) UL (ref 1.5–7.7)
NEUTROPHILS # BLD AUTO: 3.79 X10(3) UL (ref 1.5–7.7)
NEUTROPHILS NFR BLD AUTO: 46.2 %
NONHDLC SERPL-MCNC: 176 MG/DL (ref ?–130)
OSMOLALITY SERPL CALC.SUM OF ELEC: 291 MOSM/KG (ref 275–295)
PATIENT FASTING Y/N/NP: YES
PATIENT FASTING Y/N/NP: YES
PLATELET # BLD AUTO: 301 10(3)UL (ref 150–450)
POTASSIUM SERPL-SCNC: 4.1 MMOL/L (ref 3.5–5.1)
RBC # BLD AUTO: 4.95 X10(6)UL
SODIUM SERPL-SCNC: 140 MMOL/L (ref 136–145)
TRIGL SERPL-MCNC: 67 MG/DL (ref 30–149)
VLDLC SERPL CALC-MCNC: 13 MG/DL (ref 0–30)
WBC # BLD AUTO: 8.2 X10(3) UL (ref 4–11)

## 2021-01-09 PROCEDURE — 80061 LIPID PANEL: CPT

## 2021-01-09 PROCEDURE — 85025 COMPLETE CBC W/AUTO DIFF WBC: CPT

## 2021-01-09 PROCEDURE — 80053 COMPREHEN METABOLIC PANEL: CPT

## 2021-01-09 PROCEDURE — 36415 COLL VENOUS BLD VENIPUNCTURE: CPT

## 2021-01-10 ENCOUNTER — TELEPHONE (OUTPATIENT)
Dept: INTERNAL MEDICINE CLINIC | Facility: CLINIC | Age: 56
End: 2021-01-10

## 2021-01-10 DIAGNOSIS — R73.01 IFG (IMPAIRED FASTING GLUCOSE): ICD-10-CM

## 2021-01-10 DIAGNOSIS — R74.8 ELEVATED ALKALINE PHOSPHATASE LEVEL: ICD-10-CM

## 2021-01-10 DIAGNOSIS — E83.52 HYPERCALCEMIA: Primary | ICD-10-CM

## 2021-01-12 ENCOUNTER — OFFICE VISIT (OUTPATIENT)
Dept: INTERNAL MEDICINE CLINIC | Facility: CLINIC | Age: 56
End: 2021-01-12
Payer: MEDICAID

## 2021-01-12 ENCOUNTER — LAB ENCOUNTER (OUTPATIENT)
Dept: LAB | Age: 56
End: 2021-01-12
Attending: INTERNAL MEDICINE
Payer: MEDICAID

## 2021-01-12 VITALS
DIASTOLIC BLOOD PRESSURE: 76 MMHG | SYSTOLIC BLOOD PRESSURE: 124 MMHG | HEIGHT: 64 IN | RESPIRATION RATE: 12 BRPM | WEIGHT: 219 LBS | HEART RATE: 91 BPM | BODY MASS INDEX: 37.39 KG/M2 | TEMPERATURE: 97 F

## 2021-01-12 DIAGNOSIS — Z00.00 ANNUAL PHYSICAL EXAM: Primary | ICD-10-CM

## 2021-01-12 DIAGNOSIS — R73.01 IFG (IMPAIRED FASTING GLUCOSE): ICD-10-CM

## 2021-01-12 DIAGNOSIS — Z01.419 WELL FEMALE EXAM WITH ROUTINE GYNECOLOGICAL EXAM: ICD-10-CM

## 2021-01-12 DIAGNOSIS — E55.9 VITAMIN D DEFICIENCY: ICD-10-CM

## 2021-01-12 DIAGNOSIS — R51.9 CHRONIC NONINTRACTABLE HEADACHE, UNSPECIFIED HEADACHE TYPE: ICD-10-CM

## 2021-01-12 DIAGNOSIS — I10 ESSENTIAL HYPERTENSION WITH GOAL BLOOD PRESSURE LESS THAN 140/90: ICD-10-CM

## 2021-01-12 DIAGNOSIS — Z86.39 HISTORY OF PINEAL CYST: ICD-10-CM

## 2021-01-12 DIAGNOSIS — F41.1 GAD (GENERALIZED ANXIETY DISORDER): ICD-10-CM

## 2021-01-12 DIAGNOSIS — E78.2 MIXED HYPERLIPIDEMIA: ICD-10-CM

## 2021-01-12 DIAGNOSIS — E83.52 HYPERCALCEMIA: ICD-10-CM

## 2021-01-12 DIAGNOSIS — R74.8 ELEVATED ALKALINE PHOSPHATASE LEVEL: ICD-10-CM

## 2021-01-12 DIAGNOSIS — G89.29 CHRONIC NONINTRACTABLE HEADACHE, UNSPECIFIED HEADACHE TYPE: ICD-10-CM

## 2021-01-12 LAB
ALBUMIN SERPL-MCNC: 3.6 G/DL (ref 3.4–5)
ALBUMIN/GLOB SERPL: 0.9 {RATIO} (ref 1–2)
ALP LIVER SERPL-CCNC: 110 U/L
ALT SERPL-CCNC: 17 U/L
ANION GAP SERPL CALC-SCNC: 7 MMOL/L (ref 0–18)
AST SERPL-CCNC: 11 U/L (ref 15–37)
BILIRUB SERPL-MCNC: 0.2 MG/DL (ref 0.1–2)
BUN BLD-MCNC: 19 MG/DL (ref 7–18)
BUN/CREAT SERPL: 16.7 (ref 10–20)
CALCIUM BLD-MCNC: 9.8 MG/DL (ref 8.5–10.1)
CHLORIDE SERPL-SCNC: 110 MMOL/L (ref 98–112)
CO2 SERPL-SCNC: 23 MMOL/L (ref 21–32)
CREAT BLD-MCNC: 1.14 MG/DL
EST. AVERAGE GLUCOSE BLD GHB EST-MCNC: 114 MG/DL (ref 68–126)
GGT SERPL-CCNC: 25 U/L
GLOBULIN PLAS-MCNC: 4 G/DL (ref 2.8–4.4)
GLUCOSE BLD-MCNC: 95 MG/DL (ref 70–99)
HBA1C MFR BLD HPLC: 5.6 % (ref ?–5.7)
M PROTEIN MFR SERPL ELPH: 7.6 G/DL (ref 6.4–8.2)
OSMOLALITY SERPL CALC.SUM OF ELEC: 292 MOSM/KG (ref 275–295)
PATIENT FASTING Y/N/NP: NO
POTASSIUM SERPL-SCNC: 3.7 MMOL/L (ref 3.5–5.1)
PTH-INTACT SERPL-MCNC: 41.3 PG/ML (ref 18.5–88)
SODIUM SERPL-SCNC: 140 MMOL/L (ref 136–145)

## 2021-01-12 PROCEDURE — 3074F SYST BP LT 130 MM HG: CPT | Performed by: INTERNAL MEDICINE

## 2021-01-12 PROCEDURE — 82977 ASSAY OF GGT: CPT

## 2021-01-12 PROCEDURE — 36415 COLL VENOUS BLD VENIPUNCTURE: CPT

## 2021-01-12 PROCEDURE — 3078F DIAST BP <80 MM HG: CPT | Performed by: INTERNAL MEDICINE

## 2021-01-12 PROCEDURE — 3008F BODY MASS INDEX DOCD: CPT | Performed by: INTERNAL MEDICINE

## 2021-01-12 PROCEDURE — 99396 PREV VISIT EST AGE 40-64: CPT | Performed by: INTERNAL MEDICINE

## 2021-01-12 PROCEDURE — 83036 HEMOGLOBIN GLYCOSYLATED A1C: CPT

## 2021-01-12 PROCEDURE — 83970 ASSAY OF PARATHORMONE: CPT

## 2021-01-12 PROCEDURE — 80053 COMPREHEN METABOLIC PANEL: CPT

## 2021-01-12 RX ORDER — LOVASTATIN 20 MG/1
20 TABLET ORAL NIGHTLY
Qty: 30 TABLET | Refills: 5 | Status: SHIPPED | OUTPATIENT
Start: 2021-01-12 | End: 2021-07-07

## 2021-01-12 RX ORDER — GABAPENTIN 300 MG/1
300 CAPSULE ORAL 4 TIMES DAILY
Qty: 120 CAPSULE | Refills: 5 | Status: SHIPPED | OUTPATIENT
Start: 2021-01-12 | End: 2021-07-07

## 2021-01-12 RX ORDER — AMLODIPINE BESYLATE 10 MG/1
10 TABLET ORAL NIGHTLY
Qty: 30 TABLET | Refills: 5 | Status: SHIPPED | OUTPATIENT
Start: 2021-01-12 | End: 2021-07-07

## 2021-01-12 NOTE — PROGRESS NOTES
HPI:    Patient ID: Luisito Gorman is a 50year old female. Patient presents today for her annual physical.       Review of Systems   Constitutional: Negative. HENT: Negative. Eyes: Negative.     Respiratory: Negative for cough, shortness of breath [facility]) 2.5-1 % Rectal Lotion Apply one dose twice a day for 1 week (Patient not taking: Reported on 10/6/2020 ) 59 mL 0   • ibuprofen 600 MG Oral Tab Take 1 tablet by mouth as needed.   0     Allergies:  Penicillins             ANAPHYLAXIS    HISTORY No respiratory distress. She has no wheezes. She has no rales. Abdominal: Soft. Bowel sounds are normal. She exhibits no distension and no mass. There is no abdominal tenderness. There is no rebound and no guarding.    Musculoskeletal: Normal range of mot hypercalcemia which we will repeat to verify. Also check intact PTH.    (R74.8) Elevated alkaline phosphatase level  Plan:  check serum GGT.    (R73.01) IFG (impaired fasting glucose)  Plan: We will check her fasting glucose and check an A1c.       No orde

## 2021-01-16 ENCOUNTER — TELEPHONE (OUTPATIENT)
Dept: INTERNAL MEDICINE CLINIC | Facility: CLINIC | Age: 56
End: 2021-01-16

## 2021-01-16 DIAGNOSIS — R79.89 ELEVATED SERUM CREATININE: Primary | ICD-10-CM

## 2021-01-16 RX ORDER — AMITRIPTYLINE HYDROCHLORIDE 25 MG/1
TABLET, FILM COATED ORAL
Qty: 60 TABLET | Refills: 0 | Status: SHIPPED
Start: 2021-01-16 | End: 2021-05-10

## 2021-01-19 ENCOUNTER — TELEPHONE (OUTPATIENT)
Dept: INTERNAL MEDICINE CLINIC | Facility: CLINIC | Age: 56
End: 2021-01-19

## 2021-01-19 NOTE — TELEPHONE ENCOUNTER
Spoke with pt,  verified  Pt informed of lab result & MD recommendation, pt stated understanding. Pt was advised to schedule her f/u lab appt, she stated understanding.            Jose Elias Peguero MD   2021  8:55 AM      pls notify pt; her lab

## 2021-02-09 RX ORDER — LISINOPRIL 10 MG/1
10 TABLET ORAL 2 TIMES DAILY
Qty: 180 TABLET | Refills: 1 | Status: SHIPPED | OUTPATIENT
Start: 2021-02-09 | End: 2021-05-11

## 2021-03-09 RX ORDER — BUTALBITAL, ACETAMINOPHEN AND CAFFEINE 50; 325; 40 MG/1; MG/1; MG/1
TABLET ORAL
Qty: 60 TABLET | Refills: 0 | OUTPATIENT
Start: 2021-03-09

## 2021-03-09 RX ORDER — DIAZEPAM 5 MG/1
TABLET ORAL
Qty: 60 TABLET | Refills: 0 | OUTPATIENT
Start: 2021-03-09

## 2021-03-09 NOTE — TELEPHONE ENCOUNTER
LOV: 7/7/20  NOV: None    Per last office note, patient to follow-up in 6 months. Patient will need to schedule a follow-up appointment.

## 2021-03-30 ENCOUNTER — OFFICE VISIT (OUTPATIENT)
Dept: NEUROLOGY | Facility: CLINIC | Age: 56
End: 2021-03-30
Payer: MEDICAID

## 2021-03-30 VITALS
OXYGEN SATURATION: 98 % | DIASTOLIC BLOOD PRESSURE: 86 MMHG | SYSTOLIC BLOOD PRESSURE: 130 MMHG | BODY MASS INDEX: 38 KG/M2 | HEART RATE: 92 BPM | HEIGHT: 64 IN

## 2021-03-30 DIAGNOSIS — R56.9 SEIZURE (HCC): ICD-10-CM

## 2021-03-30 DIAGNOSIS — G44.329 CHRONIC POST-TRAUMATIC HEADACHE, NOT INTRACTABLE: Primary | ICD-10-CM

## 2021-03-30 DIAGNOSIS — Z86.39 HISTORY OF PINEAL CYST: ICD-10-CM

## 2021-03-30 PROCEDURE — 3079F DIAST BP 80-89 MM HG: CPT | Performed by: OTHER

## 2021-03-30 PROCEDURE — 99214 OFFICE O/P EST MOD 30 MIN: CPT | Performed by: OTHER

## 2021-03-30 PROCEDURE — 3075F SYST BP GE 130 - 139MM HG: CPT | Performed by: OTHER

## 2021-03-30 RX ORDER — BUTALBITAL, ACETAMINOPHEN AND CAFFEINE 50; 325; 40 MG/1; MG/1; MG/1
1 CAPSULE ORAL 2 TIMES DAILY PRN
Qty: 30 CAPSULE | Refills: 5 | Status: SHIPPED | OUTPATIENT
Start: 2021-03-30 | End: 2021-03-30

## 2021-03-30 RX ORDER — BUTALBITAL, ACETAMINOPHEN AND CAFFEINE 50; 325; 40 MG/1; MG/1; MG/1
1 TABLET ORAL 2 TIMES DAILY PRN
Qty: 60 TABLET | Refills: 2 | Status: SHIPPED | OUTPATIENT
Start: 2021-03-30 | End: 2021-06-21

## 2021-03-30 RX ORDER — DIAZEPAM 5 MG/1
5 TABLET ORAL EVERY 12 HOURS PRN
Qty: 60 TABLET | Refills: 2 | Status: SHIPPED | OUTPATIENT
Start: 2021-03-30 | End: 2021-06-21

## 2021-03-30 NOTE — PROGRESS NOTES
HPI:    Patient ID: Zev Lynn is a 50year old female. PCP: Dr Elayne Bueno    HPI     Zev Lynn is a 48year old female who presented to establish care with me for post traumatic headaches and seizure like spells.  She had a severe traumatic brain BIOPSY STEREO NODULE 1 SITE RIGHT (CPT=19081)     • TUBAL LIGATION Bilateral 1990      Family History   Problem Relation Age of Onset   • Heart Disorder Father         Bypass x 3, heroin addict, kidney stones   • No Known Problems Mother    • No Known Prob 0   • ibuprofen 600 MG Oral Tab Take 1 tablet by mouth as needed. 0   • VENTOLIN  (90 BASE) MCG/ACT Inhalation Aero Soln Inhale 2 puffs into the lungs every 6 (six) hours as needed.  For wheezing  3   • Butalbital-APAP-Caffeine -40 MG Oral Tab [facility])  Plan: EEG          Chronic headaches ?post traumatic and some component of analgesic overuse headache.    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

## 2021-04-08 RX ORDER — MIRTAZAPINE 15 MG/1
15 TABLET, FILM COATED ORAL NIGHTLY
Qty: 30 TABLET | Refills: 2 | Status: SHIPPED | OUTPATIENT
Start: 2021-04-08 | End: 2021-07-07

## 2021-05-10 RX ORDER — AMITRIPTYLINE HYDROCHLORIDE 25 MG/1
TABLET, FILM COATED ORAL
Qty: 60 TABLET | Refills: 0 | Status: SHIPPED | OUTPATIENT
Start: 2021-05-10 | End: 2021-06-07

## 2021-05-10 NOTE — TELEPHONE ENCOUNTER
Refill request for amitriptyline 25 mg, take 2 tabs nightly, #60, no refills    LOV: 3/30/21  NOV: none  Last refilled on 1/16/21

## 2021-05-11 RX ORDER — LISINOPRIL 10 MG/1
10 TABLET ORAL 2 TIMES DAILY
Qty: 180 TABLET | Refills: 1 | Status: SHIPPED | OUTPATIENT
Start: 2021-05-11 | End: 2021-07-07

## 2021-05-11 NOTE — TELEPHONE ENCOUNTER
lisinopril 10 MG Oral Tab, Take 1 tablet (10 mg total) by mouth 2 (two) times daily. , Disp: 180 tablet, Rfl: 1

## 2021-05-20 ENCOUNTER — PROCEDURE VISIT (OUTPATIENT)
Dept: NEUROLOGY | Facility: CLINIC | Age: 56
End: 2021-05-20

## 2021-05-20 ENCOUNTER — NURSE ONLY (OUTPATIENT)
Dept: ELECTROPHYSIOLOGY | Facility: HOSPITAL | Age: 56
End: 2021-05-20
Attending: Other
Payer: MEDICAID

## 2021-05-20 DIAGNOSIS — R56.9 SEIZURE (HCC): Primary | ICD-10-CM

## 2021-05-20 PROCEDURE — 95816 EEG AWAKE AND DROWSY: CPT | Performed by: OTHER

## 2021-05-20 PROCEDURE — 95816 EEG AWAKE AND DROWSY: CPT

## 2021-05-26 ENCOUNTER — OFFICE VISIT (OUTPATIENT)
Dept: OBGYN CLINIC | Facility: CLINIC | Age: 56
End: 2021-05-26
Payer: MEDICAID

## 2021-05-26 VITALS
WEIGHT: 215 LBS | HEART RATE: 101 BPM | SYSTOLIC BLOOD PRESSURE: 112 MMHG | BODY MASS INDEX: 39.56 KG/M2 | HEIGHT: 62 IN | DIASTOLIC BLOOD PRESSURE: 80 MMHG

## 2021-05-26 DIAGNOSIS — Z01.419 ENCOUNTER FOR GYNECOLOGICAL EXAMINATION WITHOUT ABNORMAL FINDING: Primary | ICD-10-CM

## 2021-05-26 PROCEDURE — 3074F SYST BP LT 130 MM HG: CPT | Performed by: OBSTETRICS & GYNECOLOGY

## 2021-05-26 PROCEDURE — 3079F DIAST BP 80-89 MM HG: CPT | Performed by: OBSTETRICS & GYNECOLOGY

## 2021-05-26 PROCEDURE — 99386 PREV VISIT NEW AGE 40-64: CPT | Performed by: OBSTETRICS & GYNECOLOGY

## 2021-05-26 PROCEDURE — 3008F BODY MASS INDEX DOCD: CPT | Performed by: OBSTETRICS & GYNECOLOGY

## 2021-05-26 NOTE — PROGRESS NOTES
Ila Fontanez is a 50year old female  No LMP recorded. Patient has had a hysterectomy. Patient presents with:  Gyn Exam: ANNUAL EXAM --  new pt. hx TL[facility]  .     OBSTETRICS HISTORY:  OB History    Para Term  AB Living   3 2 2   1 2   SAB Take 1 tablet (15 mg total) by mouth nightly., Disp: 30 tablet, Rfl: 2  •  diazepam 5 MG Oral Tab, Take 1 tablet (5 mg total) by mouth every 12 (twelve) hours as needed for Anxiety. , Disp: 60 tablet, Rfl: 2  •  Butalbital-APAP-Caffeine -40 MG Oral Ta palpable masses, tenderness, asymmetry, nipple discharge, nipple retraction or skin changes  Abdomen:   soft, nontender, nondistended, no masses  Skin/Hair:  no unusual rashes or bruises  Extremities:  no edema, no cyanosis  Psychiatric:   oriented to time

## 2021-05-26 NOTE — PROCEDURES
ELECTROENCEPHALOGRAM REPORT      Patient Name: Melanie Santos  Chart ID: [id number]  Ordering Physician: Carina Bean MD Date of Test:  5/20/2021  Referring Physician:   Patient Diagnosis: Seizure (hcc)  (primary encounter diagnosis)    HISTORY   A [address]

## 2021-06-07 DIAGNOSIS — G44.329 CHRONIC POST-TRAUMATIC HEADACHE, NOT INTRACTABLE: Primary | ICD-10-CM

## 2021-06-07 RX ORDER — AMITRIPTYLINE HYDROCHLORIDE 25 MG/1
TABLET, FILM COATED ORAL
Qty: 60 TABLET | Refills: 0 | Status: SHIPPED | OUTPATIENT
Start: 2021-06-07 | End: 2021-07-07

## 2021-06-07 NOTE — TELEPHONE ENCOUNTER
Refill request for AMITRIPTYLINE HCL 25 MG Oral Tab, TAKE 2 TABLETS BY MOUTH NIGHTLY, 60 tabs with 0 refills,    LOV: 3/30/21  NOV: 6/8/21    Last refilled: 5/10/21

## 2021-06-08 ENCOUNTER — TELEPHONE (OUTPATIENT)
Dept: NEUROLOGY | Facility: CLINIC | Age: 56
End: 2021-06-08

## 2021-06-08 ENCOUNTER — OFFICE VISIT (OUTPATIENT)
Dept: NEUROLOGY | Facility: CLINIC | Age: 56
End: 2021-06-08
Payer: MEDICAID

## 2021-06-08 VITALS
WEIGHT: 214 LBS | HEIGHT: 62 IN | OXYGEN SATURATION: 98 % | DIASTOLIC BLOOD PRESSURE: 70 MMHG | BODY MASS INDEX: 39.38 KG/M2 | HEART RATE: 94 BPM | SYSTOLIC BLOOD PRESSURE: 118 MMHG

## 2021-06-08 DIAGNOSIS — G44.329 CHRONIC POST-TRAUMATIC HEADACHE, NOT INTRACTABLE: Primary | ICD-10-CM

## 2021-06-08 DIAGNOSIS — R29.898 LEG WEAKNESS, BILATERAL: ICD-10-CM

## 2021-06-08 PROCEDURE — 3074F SYST BP LT 130 MM HG: CPT | Performed by: OTHER

## 2021-06-08 PROCEDURE — 3078F DIAST BP <80 MM HG: CPT | Performed by: OTHER

## 2021-06-08 PROCEDURE — 3008F BODY MASS INDEX DOCD: CPT | Performed by: OTHER

## 2021-06-08 PROCEDURE — 99213 OFFICE O/P EST LOW 20 MIN: CPT | Performed by: OTHER

## 2021-06-08 NOTE — PROGRESS NOTES
HPI:    Patient ID: Kurt Murphy is a 50year old female. PCP: Dr Monse Connolly    HPI     Patient presented for follow-up for chronic posttraumatic headaches and seizure-like spells.   Patient reports he continues to have spasms shooting pains in the head SCANNED TO MEDIA TAB: 04-   • HYSTERECTOMY  2007   • EUNICE BIOPSY STEREO NODULE 1 SITE RIGHT (CPT=19081)     • TUBAL LIGATION Bilateral 1990      Family History   Problem Relation Age of Onset   • Heart Disorder Father         Bypass x 3, heroin addic 300 MG Oral Cap Take 1 capsule (300 mg total) by mouth 4 (four) times daily. 120 capsule 5   • VENTOLIN  (90 BASE) MCG/ACT Inhalation Aero Soln Inhale 2 puffs into the lungs every 6 (six) hours as needed.  For wheezing  3     Allergies:  Penicillins stable on most recent MRI brain     Unclear whether the episodes of loss of consciousness represent true seizures, syncopal events, or pseudoseizures.   She had another event yesterday due to missed medications- on valium chronically  EEG negative    Plan:

## 2021-06-08 NOTE — TELEPHONE ENCOUNTER
Contacted [employer] online Case/Reference # [id number] to initiate authorization for L-Spine MRI CPT 14966 to be done at [address] [place] [address].   Balbir requested clinicals-clinicals faxed to [phone]  Status: pending

## 2021-06-09 NOTE — TELEPHONE ENCOUNTER
Received DENIAL notice from [address] for L-Spine MRI  Confirmed denial reason w/ Mattbrisa Lamar at [address]    Denial reason: Based on [employer] Guidelines. Your records show that you are having low back pain that travels to your hip and or leg.  The re

## 2021-06-21 ENCOUNTER — TELEPHONE (OUTPATIENT)
Dept: INTERNAL MEDICINE CLINIC | Facility: CLINIC | Age: 56
End: 2021-06-21

## 2021-06-21 DIAGNOSIS — Z12.31 BREAST CANCER SCREENING BY MAMMOGRAM: Primary | ICD-10-CM

## 2021-06-21 RX ORDER — BUTALBITAL, ACETAMINOPHEN AND CAFFEINE 50; 325; 40 MG/1; MG/1; MG/1
TABLET ORAL
Qty: 60 TABLET | Refills: 0 | Status: SHIPPED | OUTPATIENT
Start: 2021-06-24 | End: 2021-07-16

## 2021-06-21 RX ORDER — DIAZEPAM 5 MG/1
TABLET ORAL
Qty: 60 TABLET | Refills: 0 | Status: SHIPPED | OUTPATIENT
Start: 2021-06-22 | End: 2021-07-16

## 2021-06-21 NOTE — TELEPHONE ENCOUNTER
Refill request for diazepam 5 mg, take 1 tab every 12 hrs as needed, #60, 2 refills -- [place]     Refill request for fioricet -40 mg, BID PRN, #60, 2 refills -- [place]     LOV: 6/8/21  NOV: none

## 2021-06-21 NOTE — TELEPHONE ENCOUNTER
Routed to Dr Monica Sagastume for advise, thanks. Screening mammo order pended. No future appointments.

## 2021-07-07 DIAGNOSIS — G44.329 CHRONIC POST-TRAUMATIC HEADACHE, NOT INTRACTABLE: ICD-10-CM

## 2021-07-07 RX ORDER — AMITRIPTYLINE HYDROCHLORIDE 25 MG/1
TABLET, FILM COATED ORAL
Qty: 60 TABLET | Refills: 5 | Status: SHIPPED | OUTPATIENT
Start: 2021-07-07 | End: 2021-12-30

## 2021-07-07 RX ORDER — LOVASTATIN 20 MG/1
TABLET ORAL
Qty: 90 TABLET | Refills: 0 | Status: SHIPPED | OUTPATIENT
Start: 2021-07-07 | End: 2021-08-16

## 2021-07-07 RX ORDER — AMLODIPINE BESYLATE 10 MG/1
TABLET ORAL
Qty: 90 TABLET | Refills: 0 | Status: SHIPPED | OUTPATIENT
Start: 2021-07-07 | End: 2021-08-16

## 2021-07-07 RX ORDER — GABAPENTIN 300 MG/1
CAPSULE ORAL
Qty: 360 CAPSULE | Refills: 0 | Status: SHIPPED | OUTPATIENT
Start: 2021-07-07 | End: 2021-08-16

## 2021-07-07 RX ORDER — MIRTAZAPINE 15 MG/1
TABLET, FILM COATED ORAL
Qty: 30 TABLET | Refills: 0 | Status: SHIPPED | OUTPATIENT
Start: 2021-07-07 | End: 2021-08-16

## 2021-07-07 NOTE — TELEPHONE ENCOUNTER
Medication: AMITRIPTYLINE HCL 25 MG Oral Tab    Date of last refill: 6/7/2021 (#60/0)  [place]a    Last office visit: 6/8/2021  Due back to clinic per last office note:  5-6 months   Date next office visit scheduled:

## 2021-07-08 RX ORDER — MIRTAZAPINE 15 MG/1
15 TABLET, FILM COATED ORAL NIGHTLY
Qty: 30 TABLET | Refills: 0 | Status: SHIPPED | OUTPATIENT
Start: 2021-07-08 | End: 2021-09-01

## 2021-07-08 RX ORDER — LOVASTATIN 20 MG/1
20 TABLET ORAL NIGHTLY
Qty: 30 TABLET | Refills: 0 | Status: SHIPPED | OUTPATIENT
Start: 2021-07-08 | End: 2021-09-29

## 2021-07-08 RX ORDER — LISINOPRIL 10 MG/1
10 TABLET ORAL 2 TIMES DAILY
Qty: 180 TABLET | Refills: 0 | Status: SHIPPED | OUTPATIENT
Start: 2021-07-08 | End: 2021-11-27

## 2021-07-08 RX ORDER — AMLODIPINE BESYLATE 10 MG/1
10 TABLET ORAL NIGHTLY
Qty: 30 TABLET | Refills: 0 | Status: SHIPPED | OUTPATIENT
Start: 2021-07-08 | End: 2021-09-29

## 2021-07-08 RX ORDER — GABAPENTIN 300 MG/1
300 CAPSULE ORAL 4 TIMES DAILY
Qty: 120 CAPSULE | Refills: 0 | Status: SHIPPED | OUTPATIENT
Start: 2021-07-08 | End: 2021-09-29

## 2021-07-08 RX ORDER — AMITRIPTYLINE HYDROCHLORIDE 25 MG/1
50 TABLET, FILM COATED ORAL NIGHTLY
Qty: 60 TABLET | Refills: 5 | OUTPATIENT
Start: 2021-07-08

## 2021-07-16 RX ORDER — ALBUTEROL SULFATE 90 UG/1
2 AEROSOL, METERED RESPIRATORY (INHALATION) EVERY 6 HOURS PRN
Qty: 1 EACH | Refills: 0 | Status: SHIPPED | OUTPATIENT
Start: 2021-07-16

## 2021-07-16 RX ORDER — DIAZEPAM 5 MG/1
5 TABLET ORAL EVERY 12 HOURS PRN
Qty: 60 TABLET | Refills: 0 | Status: SHIPPED | OUTPATIENT
Start: 2021-07-21 | End: 2021-08-16

## 2021-07-16 RX ORDER — BUTALBITAL, ACETAMINOPHEN AND CAFFEINE 50; 325; 40 MG/1; MG/1; MG/1
1 TABLET ORAL 2 TIMES DAILY PRN
Qty: 60 TABLET | Refills: 0 | Status: SHIPPED | OUTPATIENT
Start: 2021-07-23 | End: 2021-08-16

## 2021-07-16 NOTE — TELEPHONE ENCOUNTER
Refill request for diazepam 5 mg, BID, #60, no refills -- [place]     Refill request for fioricet -40 mg, BID PRN, #60, no refills -- [place]     LOV: 6/8/21  NOV: None

## 2021-07-16 NOTE — TELEPHONE ENCOUNTER
1. Caller Name: Kika                                         Call Back Number: [phone] (RX [phone]    ALBUTEROL A 90MCG (18GM) AER  QTY :18  INHALE 2 PUFFS BY MOUTH EVERY 4 HRS as needed. Note: Need refills,if denied please inform.

## 2021-07-24 ENCOUNTER — APPOINTMENT (OUTPATIENT)
Dept: GENERAL RADIOLOGY | Age: 56
End: 2021-07-24
Attending: NURSE PRACTITIONER
Payer: MEDICAID

## 2021-07-24 ENCOUNTER — HOSPITAL ENCOUNTER (OUTPATIENT)
Age: 56
Discharge: HOME | End: 2021-07-24
Payer: MEDICAID

## 2021-07-24 VITALS
SYSTOLIC BLOOD PRESSURE: 131 MMHG | RESPIRATION RATE: 20 BRPM | TEMPERATURE: 98 F | OXYGEN SATURATION: 97 % | DIASTOLIC BLOOD PRESSURE: 102 MMHG | HEART RATE: 109 BPM

## 2021-07-24 DIAGNOSIS — M25.461 KNEE EFFUSION, RIGHT: Primary | ICD-10-CM

## 2021-07-24 DIAGNOSIS — R29.6 FREQUENT FALLS: ICD-10-CM

## 2021-07-24 PROCEDURE — A6449 LT COMPRES BAND >=3" <5"/YD: HCPCS | Performed by: NURSE PRACTITIONER

## 2021-07-24 PROCEDURE — 73562 X-RAY EXAM OF KNEE 3: CPT | Performed by: NURSE PRACTITIONER

## 2021-07-24 PROCEDURE — 99203 OFFICE O/P NEW LOW 30 MIN: CPT | Performed by: NURSE PRACTITIONER

## 2021-07-24 PROCEDURE — 96372 THER/PROPH/DIAG INJ SC/IM: CPT | Performed by: NURSE PRACTITIONER

## 2021-07-24 RX ORDER — KETOROLAC TROMETHAMINE 30 MG/ML
30 INJECTION, SOLUTION INTRAMUSCULAR; INTRAVENOUS ONCE
Status: COMPLETED | OUTPATIENT
Start: 2021-07-24 | End: 2021-07-24

## 2021-07-24 NOTE — ED PROVIDER NOTES
Patient Seen in: [facility] [address] [place] [address]      History   Patient presents with:  Knee Pain    Stated Complaint: Knee Pain    HPI/Subjective:   HPI  Patient is 70-year-old female past medical history of traumatic brain injury 2007/seizure-like episodes,  Gume Villegas MD;  Location: [facility] ENDOSCOPY   • ELECTROCARDIOGRAM, COMPLETE  04-    SCANNED TO MEDIA TAB: 04-   • HYSTERECTOMY  2007   • EUNICE BIOPSY STEREO NODULE 1 SITE RIGHT (CPT=19081)     • TUBAL LIGATION Bilateral 1990 S Negative  - Posterior drawer: Negative      The lower extremity is warm and well perfused and neurovascularly intact. 2+ popliteal and DP/ PT pulses bilaterally. Strength and sensation to gross touch are intact.           Skin:     General: Skin is warm and (primary encounter diagnosis)  Frequent falls     Disposition:  Discharge  7/24/2021  3:34 pm    Follow-up:  Meagan Carlson MD  [address]  [phone]    Schedule an appointment as soon as possible for a visit

## 2021-07-24 NOTE — ED INITIAL ASSESSMENT (HPI)
Patient c/o right knee pain and swelling. States she fell on it approximately 4 months ago and has had multiple falls since. States she trips over clutter in the house.

## 2021-07-27 ENCOUNTER — TELEPHONE (OUTPATIENT)
Dept: INTERNAL MEDICINE CLINIC | Facility: CLINIC | Age: 56
End: 2021-07-27

## 2021-07-27 ENCOUNTER — TELEPHONE (OUTPATIENT)
Dept: NEUROLOGY | Facility: CLINIC | Age: 56
End: 2021-07-27

## 2021-07-27 DIAGNOSIS — M25.569 ACUTE KNEE PAIN, UNSPECIFIED LATERALITY: Primary | ICD-10-CM

## 2021-07-27 NOTE — TELEPHONE ENCOUNTER
Patient is requesting a referral to see a bone specialist. She was seen at the urgent care for he knees and was told she needed to see a specialist. Please call back.

## 2021-07-28 ENCOUNTER — TELEPHONE (OUTPATIENT)
Dept: ORTHOPEDICS CLINIC | Facility: CLINIC | Age: 56
End: 2021-07-28

## 2021-07-28 NOTE — TELEPHONE ENCOUNTER
Impression   CONCLUSION:  Small joint effusion.  No acute fracture or other acute bony process. Pt coming to see you for knee pain after fall. No OA noted in the notes. Just wondering, Are these images okay, or would you like WB xrays.

## 2021-07-28 NOTE — TELEPHONE ENCOUNTER
Imaging was completed for this patient for RT KNEE, but it needs to be reviewed to see if additional imaging is needed. If so, please enter the appropriate RX and let the patient know to come in before their appointment to complete the additional imaging.

## 2021-07-28 NOTE — TELEPHONE ENCOUNTER
Good Morning Dr Karle Meckel    Please see message below and generate a referral to Ortho if you agree with patient plan of care.     Thank you,  [facility]  [phone]

## 2021-08-12 ENCOUNTER — TELEPHONE (OUTPATIENT)
Dept: INTERNAL MEDICINE CLINIC | Facility: CLINIC | Age: 56
End: 2021-08-12

## 2021-08-16 ENCOUNTER — OFFICE VISIT (OUTPATIENT)
Dept: ORTHOPEDICS CLINIC | Facility: CLINIC | Age: 56
End: 2021-08-16
Payer: MEDICAID

## 2021-08-16 VITALS — HEIGHT: 65 IN | WEIGHT: 220.63 LBS | HEART RATE: 85 BPM | OXYGEN SATURATION: 99 % | BODY MASS INDEX: 36.76 KG/M2

## 2021-08-16 DIAGNOSIS — S83.411A SPRAIN OF MEDIAL COLLATERAL LIGAMENT OF RIGHT KNEE, INITIAL ENCOUNTER: Primary | ICD-10-CM

## 2021-08-16 DIAGNOSIS — R26.81 GAIT INSTABILITY: ICD-10-CM

## 2021-08-16 PROCEDURE — 3008F BODY MASS INDEX DOCD: CPT | Performed by: ORTHOPAEDIC SURGERY

## 2021-08-16 PROCEDURE — 99203 OFFICE O/P NEW LOW 30 MIN: CPT | Performed by: ORTHOPAEDIC SURGERY

## 2021-08-16 NOTE — TELEPHONE ENCOUNTER
Refill request for diazepam 5 mg, BID, #60, no refills -- [place] have been [place] on 7/21)     Refill request for fioricet -40 mg, BID PRN, #60, no refills -- [place] have been dispe

## 2021-08-16 NOTE — H&P
[facility] New Patient Note    CC: Patient presents with:  Knee Pain: right knee pain       HPI: This 50year old female presents today with complaints of right knee pain. Patient reports this is been going on for 6 months.   She is present here with Albuterol Sulfate  (90 Base) MCG/ACT Inhalation Aero Soln Inhale 2 puffs into the lungs every 6 (six) hours as needed for Wheezing. 1 each 0   • amLODIPine Besylate 10 MG Oral Tab Take 1 tablet (10 mg total) by mouth nightly.  30 tablet 0   • Lovasta deformity to the knee  Right lower extremity:  • Inspection: skin intact, mild knee joint effusion at inferior medial portal  • Palpation: Tender to palpation about medial aspect of the knee joint line, as well as along proximal medial tibia  • Range of mo her recovery on the knee. We discussed possibility of steroid injection, but she would like to hold off on this today. We will have her follow-up in 3 months for reassessment, consideration to either steroid injection or MRI if symptoms not improved.

## 2021-08-17 RX ORDER — BUTALBITAL, ACETAMINOPHEN AND CAFFEINE 50; 325; 40 MG/1; MG/1; MG/1
1 TABLET ORAL 2 TIMES DAILY PRN
Qty: 60 TABLET | Refills: 0 | OUTPATIENT
Start: 2021-08-17

## 2021-08-17 RX ORDER — DIAZEPAM 5 MG/1
5 TABLET ORAL EVERY 12 HOURS PRN
Qty: 60 TABLET | Refills: 0 | OUTPATIENT
Start: 2021-08-17

## 2021-08-17 NOTE — TELEPHONE ENCOUNTER
DUPLICATE REQUEST.    REQUEST IN PROGRESS & ROUTED TO MD. AWAITING MD APPROVAL  SEE OTHER TE FROM 8/16/21

## 2021-08-18 RX ORDER — BUTALBITAL, ACETAMINOPHEN AND CAFFEINE 50; 325; 40 MG/1; MG/1; MG/1
TABLET ORAL
Qty: 60 TABLET | Refills: 0 | Status: SHIPPED | OUTPATIENT
Start: 2021-08-22 | End: 2021-09-17

## 2021-08-18 RX ORDER — DIAZEPAM 5 MG/1
TABLET ORAL
Qty: 60 TABLET | Refills: 0 | Status: SHIPPED | OUTPATIENT
Start: 2021-08-20 | End: 2021-09-17

## 2021-08-30 ENCOUNTER — ORDER TRANSCRIPTION (OUTPATIENT)
Dept: PHYSICAL THERAPY | Facility: HOSPITAL | Age: 56
End: 2021-08-30

## 2021-08-30 DIAGNOSIS — S83.411A SPRAIN OF MEDIAL COLLATERAL LIGAMENT OF RIGHT KNEE, INITIAL ENCOUNTER: Primary | ICD-10-CM

## 2021-09-01 RX ORDER — MIRTAZAPINE 15 MG/1
15 TABLET, FILM COATED ORAL NIGHTLY
Qty: 30 TABLET | Refills: 0 | Status: SHIPPED | OUTPATIENT
Start: 2021-09-01 | End: 2021-09-29

## 2021-09-15 RX ORDER — BUTALBITAL, ACETAMINOPHEN AND CAFFEINE 50; 325; 40 MG/1; MG/1; MG/1
1 TABLET ORAL 2 TIMES DAILY PRN
Qty: 60 TABLET | Refills: 0 | Status: CANCELLED | OUTPATIENT
Start: 2021-09-15

## 2021-09-15 RX ORDER — DIAZEPAM 5 MG/1
5 TABLET ORAL EVERY 12 HOURS PRN
Qty: 60 TABLET | Refills: 0 | Status: CANCELLED | OUTPATIENT
Start: 2021-09-15

## 2021-09-15 NOTE — TELEPHONE ENCOUNTER
Refill request for fioricet -40 mg, take 1 tab BID PRN, #60, no refills -- last refilled on 8/22 per epic    Diazepam 5 mg was last refilled on 8/20 per epic.

## 2021-09-17 RX ORDER — DIAZEPAM 5 MG/1
TABLET ORAL
Qty: 60 TABLET | Refills: 0 | Status: SHIPPED | OUTPATIENT
Start: 2021-09-19 | End: 2021-10-18

## 2021-09-17 RX ORDER — BUTALBITAL, ACETAMINOPHEN AND CAFFEINE 50; 325; 40 MG/1; MG/1; MG/1
TABLET ORAL
Qty: 60 TABLET | Refills: 0 | Status: SHIPPED | OUTPATIENT
Start: 2021-09-21 | End: 2021-10-18

## 2021-09-17 NOTE — TELEPHONE ENCOUNTER
Refill request for diazepam 5 mg, BID, #60, no refills -- last refilled on 8/18/21 per pharmacy.  Per epic, should have been filled out 8/20.        Refill request for fioricet -40 mg, BID PRN, #60, no refills -- last refilled on 8/18/21, per pharmacy

## 2021-09-22 ENCOUNTER — TELEPHONE (OUTPATIENT)
Dept: INTERNAL MEDICINE CLINIC | Facility: CLINIC | Age: 56
End: 2021-09-22

## 2021-09-22 DIAGNOSIS — R05.9 COUGH: Primary | ICD-10-CM

## 2021-09-22 NOTE — TELEPHONE ENCOUNTER
Pt contacted and informed her of Dr. Seth Fishman message. Pt will do a virtual visit instead. Changed appointment  Pt states she does not want the COVID test.  Reviewed how to check in at appointment time  Pt verbalized understanding.

## 2021-09-22 NOTE — TELEPHONE ENCOUNTER
The only way to know if she has or not covid infection is to get her tested for covid. Agree with televisit. I can order covid pcr for her so she can do it right away while waiting for apptment.

## 2021-09-22 NOTE — TELEPHONE ENCOUNTER
Pt scheduled a follow-up appointment through [address], however, Pt also mentioned on appointment notes of congestion. Pt reports sinus and chest congestion is due to her opening the windows at night while sleeping.  Pt denies fever, cough, sore throat, rudy

## 2021-09-29 ENCOUNTER — TELEMEDICINE (OUTPATIENT)
Dept: INTERNAL MEDICINE CLINIC | Facility: CLINIC | Age: 56
End: 2021-09-29

## 2021-09-29 DIAGNOSIS — E78.2 MIXED HYPERLIPIDEMIA: ICD-10-CM

## 2021-09-29 DIAGNOSIS — I10 ESSENTIAL HYPERTENSION WITH GOAL BLOOD PRESSURE LESS THAN 140/90: ICD-10-CM

## 2021-09-29 DIAGNOSIS — F41.1 GAD (GENERALIZED ANXIETY DISORDER): ICD-10-CM

## 2021-09-29 DIAGNOSIS — J01.90 ACUTE NON-RECURRENT SINUSITIS, UNSPECIFIED LOCATION: Primary | ICD-10-CM

## 2021-09-29 PROCEDURE — 99213 OFFICE O/P EST LOW 20 MIN: CPT | Performed by: INTERNAL MEDICINE

## 2021-09-29 RX ORDER — MIRTAZAPINE 15 MG/1
15 TABLET, FILM COATED ORAL NIGHTLY
Qty: 30 TABLET | Refills: 2 | Status: SHIPPED | OUTPATIENT
Start: 2021-09-29 | End: 2021-12-30

## 2021-09-29 RX ORDER — AMLODIPINE BESYLATE 10 MG/1
10 TABLET ORAL NIGHTLY
Qty: 30 TABLET | Refills: 2 | Status: SHIPPED | OUTPATIENT
Start: 2021-09-29

## 2021-09-29 RX ORDER — MIRTAZAPINE 15 MG/1
15 TABLET, FILM COATED ORAL NIGHTLY
Qty: 30 TABLET | Refills: 2 | Status: CANCELLED | OUTPATIENT
Start: 2021-09-29

## 2021-09-29 RX ORDER — GABAPENTIN 300 MG/1
300 CAPSULE ORAL 4 TIMES DAILY
Qty: 120 CAPSULE | Refills: 2 | Status: SHIPPED | OUTPATIENT
Start: 2021-09-29

## 2021-09-29 RX ORDER — LOVASTATIN 20 MG/1
20 TABLET ORAL NIGHTLY
Qty: 30 TABLET | Refills: 2 | Status: CANCELLED | OUTPATIENT
Start: 2021-09-29

## 2021-09-29 RX ORDER — LOVASTATIN 20 MG/1
20 TABLET ORAL NIGHTLY
Qty: 30 TABLET | Refills: 2 | Status: SHIPPED | OUTPATIENT
Start: 2021-09-29

## 2021-09-29 RX ORDER — AMLODIPINE BESYLATE 10 MG/1
10 TABLET ORAL NIGHTLY
Qty: 30 TABLET | Refills: 2 | Status: CANCELLED | OUTPATIENT
Start: 2021-09-29

## 2021-09-29 RX ORDER — GABAPENTIN 300 MG/1
300 CAPSULE ORAL 4 TIMES DAILY
Qty: 120 CAPSULE | Refills: 2 | Status: CANCELLED | OUTPATIENT
Start: 2021-09-29

## 2021-09-30 NOTE — PROGRESS NOTES
Subjective:     Patient ID: Basilio Deleon is a 49year old female. Sinus Problem  This is a new problem. The current episode started in the past 7 days. The problem has been gradually improving since onset. There has been no fever. The pain is mild.  As • DIAZEPAM 5 MG Oral Tab TAKE 1 TABLET BY MOUTH EVERY 12 HOURS AS NEEDED FOR ANXIETY 60 tablet 0   • BUTALBITAL-ACETAMINOPHEN-CAFFEINE -40 MG Oral Tab Take 1 tablet by mouth twice daily as needed 60 tablet 0   • Misc.  Devices [facility]) Does not appl Tobacco comment: smokes only 2 to 3 sticks /day    Alcohol use: No      Alcohol/week: 0.0 standard drinks    Drug use: No       Objective:   Physical Exam  Constitutional:       General: She is not in acute distress.      Appearance: She is not ill-appea

## 2021-10-04 ENCOUNTER — OFFICE VISIT (OUTPATIENT)
Dept: PHYSICAL THERAPY | Facility: HOSPITAL | Age: 56
End: 2021-10-04
Attending: ORTHOPAEDIC SURGERY
Payer: MEDICAID

## 2021-10-04 DIAGNOSIS — S83.411A SPRAIN OF MEDIAL COLLATERAL LIGAMENT OF RIGHT KNEE, INITIAL ENCOUNTER: ICD-10-CM

## 2021-10-04 PROCEDURE — 97162 PT EVAL MOD COMPLEX 30 MIN: CPT

## 2021-10-04 NOTE — PROGRESS NOTES
LOWER EXTREMITY EVALUATION:   Referring Physician: Dr. Ryan Sy  Diagnosis: Sprain of medial collateral ligament of right knee, initial encounter (W29.736Q)     Date of Service: 10/4/2021     PATIENT SUMMARY   Keanu Lema is a 49year old female who medical history was reviewed with Lauren.  Significant findings include essential hypertension, mixed hyperlipidemia, history of pineal cyst, seizure, vitamin D deficiency, chronic nonintractable headache    ASSESSMENT  Lauren presents to physical therapy radha decreased foot clearance R LE. The patient does not use a rolling walker in PT clinic. Today’s Treatment and Response:   Pt education was provided on exam findings, treatment diagnosis, treatment plan, expectations, and prognosis.  Pt was also provided and has agreed to actively participate in planning and for this course of care. Thank you for your referral. Please co-sign or sign and return this letter via fax as soon as possible to [phone].  If you have any questions, please contact me at Dept:

## 2021-10-06 ENCOUNTER — OFFICE VISIT (OUTPATIENT)
Dept: PHYSICAL THERAPY | Facility: HOSPITAL | Age: 56
End: 2021-10-06
Attending: ORTHOPAEDIC SURGERY
Payer: MEDICAID

## 2021-10-06 DIAGNOSIS — S83.411A SPRAIN OF MEDIAL COLLATERAL LIGAMENT OF RIGHT KNEE, INITIAL ENCOUNTER: ICD-10-CM

## 2021-10-06 PROCEDURE — 97116 GAIT TRAINING THERAPY: CPT

## 2021-10-06 PROCEDURE — 97110 THERAPEUTIC EXERCISES: CPT

## 2021-10-06 NOTE — PROGRESS NOTES
Dx: Sprain of medial collateral ligament of right knee, initial encounter (E68.182I)         Insurance (Authorized # of Visits):  [facility] Physician: Dr. Elia Boggs  Next MD visit: none scheduled  Fall Risk: standard         Precautions: sets - 8 reps  Heel rises with counter support - 1 x daily - 7 x weekly - 2 sets - 10 reps    Charges: there ex x2, gait training x1, man there x0       Total Timed Treatment: 45 min  Total Treatment Time: 45 min

## 2021-10-11 ENCOUNTER — OFFICE VISIT (OUTPATIENT)
Dept: PHYSICAL THERAPY | Facility: HOSPITAL | Age: 56
End: 2021-10-11
Attending: ORTHOPAEDIC SURGERY
Payer: MEDICAID

## 2021-10-11 DIAGNOSIS — S83.411A SPRAIN OF MEDIAL COLLATERAL LIGAMENT OF RIGHT KNEE, INITIAL ENCOUNTER: ICD-10-CM

## 2021-10-11 PROCEDURE — 97140 MANUAL THERAPY 1/> REGIONS: CPT

## 2021-10-11 PROCEDURE — 97110 THERAPEUTIC EXERCISES: CPT

## 2021-10-11 NOTE — PROGRESS NOTES
Dx: Sprain of medial collateral ligament of right knee, initial encounter (T37.649C)         Insurance (Authorized # of Visits):  [facility] Physician: Dr. Ji Taemz  Next MD visit: none scheduled  Fall Risk: standard         Precautions: med-lat grade III Man there  Patellar mobs superior-inferiro and med-lat grade III in knee flexed position  PROM into knee flexion multiple bouts within pain tolerance      Gait Training  Walking heel-toe in // bars 2x  Discussion stair negotiation, step-t

## 2021-10-13 ENCOUNTER — OFFICE VISIT (OUTPATIENT)
Dept: PHYSICAL THERAPY | Facility: HOSPITAL | Age: 56
End: 2021-10-13
Attending: ORTHOPAEDIC SURGERY
Payer: MEDICAID

## 2021-10-13 DIAGNOSIS — S83.411A SPRAIN OF MEDIAL COLLATERAL LIGAMENT OF RIGHT KNEE, INITIAL ENCOUNTER: ICD-10-CM

## 2021-10-13 PROCEDURE — 97110 THERAPEUTIC EXERCISES: CPT

## 2021-10-13 NOTE — PROGRESS NOTES
Dx: Sprain of medial collateral ligament of right knee, initial encounter (G51.251M)         Insurance (Authorized # of Visits):  10 POC, 8 authorized        Authorizing Physician: Dr. Thelma Alfredo  Next MD visit: none scheduled  Fall Risk: standard         Pr ball 2x8  SAQ foam roller 2x10  LAQ EOB 2x10  Step up 4\" box R LE 2x6 There ex  Nustep L4, 4 min  Heel slides with strap 10x on slide board  Bridge LEs on ball 2x8  LAQ EOB 3x10  Seated HS curl EOB red TB 3x8  Seated HS stretching 3x30 sec  Tilt board donna

## 2021-10-14 DIAGNOSIS — G44.329 CHRONIC POST-TRAUMATIC HEADACHE, NOT INTRACTABLE: Primary | ICD-10-CM

## 2021-10-18 ENCOUNTER — TELEPHONE (OUTPATIENT)
Dept: PHYSICAL MEDICINE AND REHAB | Facility: CLINIC | Age: 56
End: 2021-10-18

## 2021-10-18 ENCOUNTER — APPOINTMENT (OUTPATIENT)
Dept: PHYSICAL THERAPY | Facility: HOSPITAL | Age: 56
End: 2021-10-18
Attending: ORTHOPAEDIC SURGERY
Payer: MEDICAID

## 2021-10-18 ENCOUNTER — TELEPHONE (OUTPATIENT)
Dept: PHYSICAL THERAPY | Facility: HOSPITAL | Age: 56
End: 2021-10-18

## 2021-10-18 NOTE — TELEPHONE ENCOUNTER
Refill request for fioricet -40 mg, take 1 tab as needed, #60, no refills    Refill request for diazepam 5 mg, BID PRN, #60, no refills    LOV: 6/8/21  NOV: 11/2/21  Both [place]

## 2021-10-18 NOTE — TELEPHONE ENCOUNTER
Diazepam 5MG Tablet  Take 1 tablet by mouth every 12 hours as needed for anxiety  #60, no refills  Last refilled: 9/19/21    Butalbital- acetaminophen- caffeine -40MG Tablet   Take 1 tablet by mouth twice daily as needed   #60, no refills   Last refi

## 2021-10-20 ENCOUNTER — OFFICE VISIT (OUTPATIENT)
Dept: PHYSICAL THERAPY | Facility: HOSPITAL | Age: 56
End: 2021-10-20
Attending: ORTHOPAEDIC SURGERY
Payer: MEDICAID

## 2021-10-20 DIAGNOSIS — S83.411A SPRAIN OF MEDIAL COLLATERAL LIGAMENT OF RIGHT KNEE, INITIAL ENCOUNTER: ICD-10-CM

## 2021-10-20 PROCEDURE — 97112 NEUROMUSCULAR REEDUCATION: CPT

## 2021-10-20 PROCEDURE — 97110 THERAPEUTIC EXERCISES: CPT

## 2021-10-20 RX ORDER — BUTALBITAL, ACETAMINOPHEN AND CAFFEINE 50; 325; 40 MG/1; MG/1; MG/1
1 TABLET ORAL 2 TIMES DAILY PRN
Qty: 60 TABLET | Refills: 0 | OUTPATIENT
Start: 2021-10-21

## 2021-10-20 RX ORDER — DIAZEPAM 5 MG/1
5 TABLET ORAL EVERY 12 HOURS PRN
Qty: 60 TABLET | Refills: 0 | OUTPATIENT
Start: 2021-10-20

## 2021-10-20 NOTE — PROGRESS NOTES
Dx: Sprain of medial collateral ligament of right knee, initial encounter (X59.248Q)         Insurance (Authorized # of Visits):  10 POC, 8 authorized        Authorizing Physician: Dr. Torres Stratton  Next MD visit: none scheduled  Fall Risk: standard         Pr 2x8  SAQ foam roller 2x10  LAQ EOB 2x10  Step up 4\" box R LE 2x6 There ex  Nustep L4, 4 min  Heel slides with strap 10x on slide board  Bridge LEs on ball 2x8  LAQ EOB 3x10  Seated HS curl EOB red TB 3x8  Seated HS stretching 3x30 sec  Tilt board weight s

## 2021-10-25 ENCOUNTER — APPOINTMENT (OUTPATIENT)
Dept: PHYSICAL THERAPY | Facility: HOSPITAL | Age: 56
End: 2021-10-25
Attending: ORTHOPAEDIC SURGERY
Payer: MEDICAID

## 2021-10-27 ENCOUNTER — APPOINTMENT (OUTPATIENT)
Dept: PHYSICAL THERAPY | Facility: HOSPITAL | Age: 56
End: 2021-10-27
Attending: ORTHOPAEDIC SURGERY
Payer: MEDICAID

## 2021-11-01 ENCOUNTER — TELEPHONE (OUTPATIENT)
Dept: PHYSICAL THERAPY | Facility: HOSPITAL | Age: 56
End: 2021-11-01

## 2021-11-01 ENCOUNTER — APPOINTMENT (OUTPATIENT)
Dept: PHYSICAL THERAPY | Facility: HOSPITAL | Age: 56
End: 2021-11-01
Attending: ORTHOPAEDIC SURGERY
Payer: MEDICAID

## 2021-11-02 ENCOUNTER — TELEMEDICINE (OUTPATIENT)
Dept: NEUROLOGY | Facility: CLINIC | Age: 56
End: 2021-11-02
Payer: MEDICAID

## 2021-11-02 DIAGNOSIS — R29.898 LEG WEAKNESS, BILATERAL: Primary | ICD-10-CM

## 2021-11-02 PROCEDURE — 99213 OFFICE O/P EST LOW 20 MIN: CPT | Performed by: OTHER

## 2021-11-02 NOTE — PROGRESS NOTES
HPI:    Patient ID: Candy Garcia is a 49year old female. PCP: Dr Filipe Ye      This visit is conducted using Telemedicine with live, interactive video and audio.     Patient has been referred to the [facility] website at [email or web address] to review t migraine headaches    • Hyperlipidemia    • Spasm     Chronic nerve spasms   • Unspecified essential hypertension       Past Surgical History:   Procedure Laterality Date   • CHOLECYSTECTOMY  1999   • COLONOSCOPY N/A 8/17/2017    Procedure: COLONOSCOPY;  S 20 MG Oral Tab Take 1 tablet (20 mg total) by mouth nightly. 30 tablet 2   • mirtazapine 15 MG Oral Tab Take 1 tablet (15 mg total) by mouth nightly. 30 tablet 2   • amLODIPine 10 MG Oral Tab Take 1 tablet (10 mg total) by mouth nightly.  30 tablet 2   • Mi bedtime      Patient does not drive and aware of seizure/syncope precautions    Follow up after MRI lumbar spine is completed    Total time spent- 15 minutes with greater than 50% spent in care co-ordination and counseling    See orders and medications juan

## 2021-11-08 ENCOUNTER — LAB ENCOUNTER (OUTPATIENT)
Dept: LAB | Age: 56
End: 2021-11-08
Attending: INTERNAL MEDICINE
Payer: MEDICAID

## 2021-11-08 ENCOUNTER — TELEPHONE (OUTPATIENT)
Dept: INTERNAL MEDICINE CLINIC | Facility: CLINIC | Age: 56
End: 2021-11-08

## 2021-11-08 ENCOUNTER — HOSPITAL ENCOUNTER (OUTPATIENT)
Dept: MAMMOGRAPHY | Facility: HOSPITAL | Age: 56
Discharge: HOME | End: 2021-11-08
Attending: INTERNAL MEDICINE
Payer: MEDICAID

## 2021-11-08 DIAGNOSIS — R79.89 ELEVATED SERUM CREATININE: ICD-10-CM

## 2021-11-08 DIAGNOSIS — Z12.31 BREAST CANCER SCREENING BY MAMMOGRAM: ICD-10-CM

## 2021-11-08 PROCEDURE — 77067 SCR MAMMO BI INCL CAD: CPT | Performed by: INTERNAL MEDICINE

## 2021-11-08 PROCEDURE — 77063 BREAST TOMOSYNTHESIS BI: CPT | Performed by: INTERNAL MEDICINE

## 2021-11-08 PROCEDURE — 80048 BASIC METABOLIC PNL TOTAL CA: CPT

## 2021-11-08 PROCEDURE — 36415 COLL VENOUS BLD VENIPUNCTURE: CPT

## 2021-11-08 NOTE — TELEPHONE ENCOUNTER
Pt states she is at lab and thinks she has more that is supposed to be completed than a BMP. Per 1/12/21 lab results. Marycruz Kelly MD   1/16/2021  8:55 AM CST         pls notify pt; her labs showed a1c was normal so no diabetes.   Her liver en

## 2021-11-09 ENCOUNTER — OFFICE VISIT (OUTPATIENT)
Dept: PHYSICAL THERAPY | Facility: HOSPITAL | Age: 56
End: 2021-11-09
Attending: INTERNAL MEDICINE
Payer: MEDICAID

## 2021-11-09 PROCEDURE — 97112 NEUROMUSCULAR REEDUCATION: CPT

## 2021-11-09 PROCEDURE — 97110 THERAPEUTIC EXERCISES: CPT

## 2021-11-09 NOTE — PROGRESS NOTES
Dx: Sprain of medial collateral ligament of right knee, initial encounter (L90.572X)         Insurance (Authorized # of Visits):  10 POC, 8 authorized        Authorizing Physician: Dr. Emanuel Florentino  Next MD visit: none scheduled  Fall Risk: standard         Pr 4/10 Date: 10/20/2021         TX#: 5/10 Date: 11/9/2021  Tx#: 6/10   There ex  Nustep L3, 4 min  Tried swiss ball DKTC for knee flexion, DC after 4 reps  Review heel slides 5x  Seated LAQ 2x10 R LE  Lunge knee flexion stretch 2x10 R LE  Standing knee flexi min

## 2021-11-12 ENCOUNTER — TELEPHONE (OUTPATIENT)
Dept: PHYSICAL MEDICINE AND REHAB | Facility: CLINIC | Age: 56
End: 2021-11-12

## 2021-11-12 ENCOUNTER — APPOINTMENT (OUTPATIENT)
Dept: PHYSICAL THERAPY | Facility: HOSPITAL | Age: 56
End: 2021-11-12
Attending: INTERNAL MEDICINE
Payer: MEDICAID

## 2021-11-12 DIAGNOSIS — G44.329 CHRONIC POST-TRAUMATIC HEADACHE, NOT INTRACTABLE: ICD-10-CM

## 2021-11-12 DIAGNOSIS — F41.9 ANXIETY: ICD-10-CM

## 2021-11-12 RX ORDER — BUTALBITAL, ACETAMINOPHEN AND CAFFEINE 50; 325; 40 MG/1; MG/1; MG/1
TABLET ORAL
Qty: 60 TABLET | Refills: 0 | Status: SHIPPED | OUTPATIENT
Start: 2021-11-18 | End: 2021-12-14

## 2021-11-12 RX ORDER — DIAZEPAM 5 MG/1
TABLET ORAL
Qty: 60 TABLET | Refills: 0 | Status: SHIPPED | OUTPATIENT
Start: 2021-11-18 | End: 2021-12-14

## 2021-11-12 NOTE — TELEPHONE ENCOUNTER
Refill request for fioricet -40 mg, take 1 tab BID PRN, #60, no refills -- [place]     Refill request for diazepam 5 mg, take 1 tab every 12 hrs as needed, #60, no refills -- [place]    Patient'

## 2021-11-16 ENCOUNTER — APPOINTMENT (OUTPATIENT)
Dept: PHYSICAL THERAPY | Facility: HOSPITAL | Age: 56
End: 2021-11-16
Payer: MEDICAID

## 2021-11-20 NOTE — TELEPHONE ENCOUNTER
Called Balbir/YADIRA for authorization of approval of MRI BRAIN (W+WO) (DLF=[id number]) Spoke to Rani Del Castillo who informed that procedure was approved [facility]#[id number]. Transfer to automated system to obtain approval number.    Authorization#[id number] eff:07/07/20 to
Add Progress Note...

## 2021-11-28 RX ORDER — LISINOPRIL 10 MG/1
10 TABLET ORAL 2 TIMES DAILY
Qty: 180 TABLET | Refills: 0 | Status: SHIPPED | OUTPATIENT
Start: 2021-11-28

## 2021-12-13 DIAGNOSIS — F41.9 ANXIETY: ICD-10-CM

## 2021-12-13 DIAGNOSIS — G44.329 CHRONIC POST-TRAUMATIC HEADACHE, NOT INTRACTABLE: ICD-10-CM

## 2021-12-13 RX ORDER — DIAZEPAM 5 MG/1
5 TABLET ORAL EVERY 12 HOURS PRN
Qty: 60 TABLET | Refills: 0 | Status: CANCELLED | OUTPATIENT
Start: 2021-12-13

## 2021-12-13 RX ORDER — BUTALBITAL, ACETAMINOPHEN AND CAFFEINE 50; 325; 40 MG/1; MG/1; MG/1
1 TABLET ORAL 2 TIMES DAILY PRN
Qty: 60 TABLET | Refills: 0 | Status: CANCELLED | OUTPATIENT
Start: 2021-12-13

## 2021-12-14 RX ORDER — BUTALBITAL, ACETAMINOPHEN AND CAFFEINE 50; 325; 40 MG/1; MG/1; MG/1
TABLET ORAL
Qty: 60 TABLET | Refills: 0 | Status: SHIPPED | OUTPATIENT
Start: 2021-12-17 | End: 2022-01-13

## 2021-12-14 RX ORDER — DIAZEPAM 5 MG/1
TABLET ORAL
Qty: 60 TABLET | Refills: 0 | Status: SHIPPED | OUTPATIENT
Start: 2021-12-17 | End: 2022-01-13

## 2021-12-14 NOTE — TELEPHONE ENCOUNTER
[place] Changed start dates on both medications. Fioricet -40 mg was last refilled on 11/15/21 per pharmacy and [address] [facility],[address]. Per [facility], prescription should have been dispensed on 11/18.   Prescription set up to be dispensed on 1
Medication: BUTALBITAL-ACETAMINOPHEN-CAFFEINE -40 MG Oral Tab    Take 1 tablet by mouth twice daily as needed    LOV:11/2/21 (telemedicine)  NOV: none    [place]21 [place] 60 tabs    ------------------------------------------------------
Patient denied ETOH and substance use./No

## 2021-12-30 DIAGNOSIS — G44.329 CHRONIC POST-TRAUMATIC HEADACHE, NOT INTRACTABLE: ICD-10-CM

## 2021-12-30 RX ORDER — AMITRIPTYLINE HYDROCHLORIDE 25 MG/1
50 TABLET, FILM COATED ORAL NIGHTLY
Qty: 60 TABLET | Refills: 5 | OUTPATIENT
Start: 2021-12-30

## 2021-12-30 RX ORDER — MIRTAZAPINE 15 MG/1
15 TABLET, FILM COATED ORAL NIGHTLY
Qty: 30 TABLET | Refills: 2 | Status: SHIPPED | OUTPATIENT
Start: 2021-12-30

## 2021-12-30 RX ORDER — MIRTAZAPINE 15 MG/1
TABLET, FILM COATED ORAL
Qty: 30 TABLET | Refills: 0 | OUTPATIENT
Start: 2021-12-30

## 2021-12-30 NOTE — TELEPHONE ENCOUNTER
Medication: AMITRIPTYLINE 25 MG Oral Tab    LOV:11/2/21  NOV:none    [place]21 (#60/5RF)

## 2022-01-03 RX ORDER — AMITRIPTYLINE HYDROCHLORIDE 25 MG/1
TABLET, FILM COATED ORAL
Qty: 60 TABLET | Refills: 5 | Status: SHIPPED | OUTPATIENT
Start: 2022-01-03

## 2022-01-10 NOTE — TELEPHONE ENCOUNTER
Occlusion system inserted  Refill request for amitriptyline 25 mg, take 2 tabs nightly, #60, 1 refill    LOV: 1/23/18  NOV: None

## 2022-01-13 DIAGNOSIS — F41.9 ANXIETY: ICD-10-CM

## 2022-01-13 DIAGNOSIS — G44.329 CHRONIC POST-TRAUMATIC HEADACHE, NOT INTRACTABLE: ICD-10-CM

## 2022-01-13 RX ORDER — DIAZEPAM 5 MG/1
TABLET ORAL
Qty: 60 TABLET | Refills: 0 | Status: SHIPPED | OUTPATIENT
Start: 2022-01-13

## 2022-01-13 RX ORDER — BUTALBITAL, ACETAMINOPHEN AND CAFFEINE 50; 325; 40 MG/1; MG/1; MG/1
TABLET ORAL
Qty: 60 TABLET | Refills: 0 | Status: SHIPPED | OUTPATIENT
Start: 2022-01-13

## 2022-01-13 NOTE — TELEPHONE ENCOUNTER
BUTALBITAL-ACETAMINOPHEN-CAFFEINE -40 MG Oral Tab   Take 1 tablet by mouth twice daily as needed  #60, no refills    DIAZEPAM 5 MG Oral Tab    TAKE 1 TABLET BY MOUTH EVERY 12 HOURS AS NEEDED FOR ANXIETY  #60, no refills    LOV: 11/2/21- telemed  NOV:

## 2022-02-02 ENCOUNTER — TELEPHONE (OUTPATIENT)
Dept: INTERNAL MEDICINE CLINIC | Facility: CLINIC | Age: 57
End: 2022-02-02

## 2022-02-02 NOTE — TELEPHONE ENCOUNTER
Her sister  a couple months ago in [address] [place] [address], her grand father just had surgery there too so she's been having so many things happening and she is planning on getting the additional views as soon as she's back from [address] [place] [address].

## 2022-02-02 NOTE — TELEPHONE ENCOUNTER
pls call pt; she had been recalled by radiologist back in NOv 2021 for additonal views of right breast and she has not done it yet. She needs to get this done .

## 2022-02-09 RX ORDER — GABAPENTIN 300 MG/1
300 CAPSULE ORAL 4 TIMES DAILY
Qty: 360 CAPSULE | Refills: 0 | Status: SHIPPED | OUTPATIENT
Start: 2022-02-09 | End: 2022-05-03

## 2022-02-09 RX ORDER — AMLODIPINE BESYLATE 10 MG/1
10 TABLET ORAL NIGHTLY
Qty: 90 TABLET | Refills: 0 | Status: SHIPPED | OUTPATIENT
Start: 2022-02-09 | End: 2022-05-03

## 2022-02-09 RX ORDER — LOVASTATIN 20 MG/1
20 TABLET ORAL NIGHTLY
Qty: 90 TABLET | Refills: 0 | Status: SHIPPED | OUTPATIENT
Start: 2022-02-09 | End: 2022-04-25 | Stop reason: DRUGHIGH

## 2022-02-09 NOTE — TELEPHONE ENCOUNTER
Please review. Protocol failed or does not have a protocol.      Requested Prescriptions   Pending Prescriptions Disp Refills    AMLODIPINE 10 MG Oral Tab [Pharmacy Med Name: amLODIPine Besylate 10 MG Oral Tablet] 30 tablet 0     Sig: Take 1 tablet by mouth nightly        Hypertensive Medications Protocol Failed - 2/7/2022  5:24 PM        Failed - Appointment in past 6 or next 3 months        Passed - CMP or BMP in past 12 months        Passed - GFR  > 50     Lab Results   Component Value Date    GFRAA 69 11/08/2021                    GABAPENTIN 300 MG Oral Cap [Pharmacy Med Name: Gabapentin 300 MG Oral Capsule] 120 capsule 0     Sig: Take 1 capsule by mouth 4 times daily        Neurology Medications Failed - 2/7/2022  5:24 PM        Failed - Appointment in the past 6 or next 3 months           LOVASTATIN 20 MG Oral Tab [Pharmacy Med Name: Lovastatin 20 MG Oral Tablet] 30 tablet 0     Sig: Take 1 tablet by mouth nightly        Cholesterol Medication Protocol Failed - 2/7/2022  5:24 PM        Failed - ALT in past 12 months        Failed - LDL in past 12 months        Failed - Last ALT < 80       Lab Results   Component Value Date    ALT 17 01/12/2021             Failed - Last LDL < 130     Lab Results   Component Value Date     (H) 01/09/2021               Failed - Appointment in past 12 or next 3 months            Future Appointments         Provider Department Appt Notes    In 1 week Scout Lee MD [address] [place], [place] [address] Follow up after PT for Rt knee  //// Nothing// try to contact to find the reason for the visit, but pt hung up, [address] message sent too,   12/8          Recent Outpatient Visits              3 months ago     [facility] [address], Karina, PT    Office Visit    3 months ago Leg weakness, bilateral    [facility] Joanne Rodriguez MD    Telemedicine    3 months ago Sprain of medial collateral ligament of right knee, initial encounter    Tiffany [address], PT    Office Visit    3 months ago Sprain of medial collateral ligament of right knee, initial encounter    Tiffany [address], PT    Office Visit    4 months ago Sprain of medial collateral ligament of right knee, initial encounter    Tiffany 68, PT    Office Visit

## 2022-02-15 RX ORDER — DIAZEPAM 5 MG/1
5 TABLET ORAL EVERY 12 HOURS PRN
Qty: 60 TABLET | Refills: 0 | Status: SHIPPED | OUTPATIENT
Start: 2022-02-15 | End: 2022-03-16

## 2022-02-15 RX ORDER — BUTALBITAL, ACETAMINOPHEN AND CAFFEINE 50; 325; 40 MG/1; MG/1; MG/1
1 TABLET ORAL 2 TIMES DAILY PRN
Qty: 60 TABLET | Refills: 0 | Status: SHIPPED | OUTPATIENT
Start: 2022-02-15 | End: 2022-03-16

## 2022-03-07 ENCOUNTER — TELEPHONE (OUTPATIENT)
Dept: PHYSICAL THERAPY | Facility: HOSPITAL | Age: 57
End: 2022-03-07

## 2022-03-07 NOTE — TELEPHONE ENCOUNTER
The patient requested a call from PT regarding physical therapy appointments. The patient missed her last PT appointments due to being out of town and she returned to town last week. The patient was asking about continuing with physical therapy for her knee, she has occasional pain. Recommended that she follow back up with her referring physician for evaluation (Dr. Gudelia Hahn) and noted that if he recommends more physical therapy she can get a PT order at that time. The patient expressed understanding to this.

## 2022-03-13 DIAGNOSIS — F41.9 ANXIETY: ICD-10-CM

## 2022-03-13 DIAGNOSIS — G44.329 CHRONIC POST-TRAUMATIC HEADACHE, NOT INTRACTABLE: ICD-10-CM

## 2022-03-14 ENCOUNTER — HOSPITAL ENCOUNTER (OUTPATIENT)
Dept: MRI IMAGING | Facility: HOSPITAL | Age: 57
Discharge: HOME | End: 2022-03-14
Attending: Other
Payer: MEDICAID

## 2022-03-14 DIAGNOSIS — R29.898 LEG WEAKNESS, BILATERAL: ICD-10-CM

## 2022-03-14 PROCEDURE — 72148 MRI LUMBAR SPINE W/O DYE: CPT | Performed by: OTHER

## 2022-03-14 RX ORDER — LISINOPRIL 10 MG/1
10 TABLET ORAL 2 TIMES DAILY
Qty: 180 TABLET | Refills: 1 | Status: SHIPPED | OUTPATIENT
Start: 2022-03-14 | End: 2022-09-26

## 2022-03-15 NOTE — TELEPHONE ENCOUNTER
Refill passed per [address] [facility] protocol. Requested Prescriptions   Pending Prescriptions Disp Refills    lisinopril 10 MG Oral Tab 180 tablet 0     Sig: Take 1 tablet (10 mg total) by mouth 2 (two) times daily.         Hypertensive Medications Protocol Failed - 3/13/2022  7:30 PM        Failed - Appointment in past 6 or next 3 months        Passed - CMP or BMP in past 12 months        Passed - GFR  > 50     Lab Results   Component Value Date    GFRAA 69 11/08/2021                     Future Appointments         Provider Department Appt Notes    In 2 days Terrance Carlton MD [address], [address] f/u- MRI results    In 3 days [address] Mammography QA NS: CONSENT/COB    In 4 weeks Winnie Marin MD [facility] - Orthopedics [facility] RT Knee, Follow up after PT for Rt knee          Recent Outpatient Visits              4 months ago     Eileen Saeed [address]Karina, PT    Office Visit    4 months ago Leg weakness, bilateral    [facility] Terrance Carlton MD    Telemedicine    4 months ago Sprain of medial collateral ligament of right knee, initial encounter    Tiffany Rahman, PT    Office Visit    5 months ago Sprain of medial collateral ligament of right knee, initial encounter    [address]iffany [address], PT    Office Visit    5 months ago Sprain of medial collateral ligament of right knee, initial encounter    [address] Angeles Fraga, PT    Office Visit

## 2022-03-15 NOTE — TELEPHONE ENCOUNTER
Medication: 2 requests.     diazePAM 5 MG Oral Tab  butalbital-acetaminophen-caffeine -40 MG Oral Tab      LOV:11/2/21  NOV:3/16/22    [place]22 for both

## 2022-03-16 ENCOUNTER — OFFICE VISIT (OUTPATIENT)
Dept: NEUROLOGY | Facility: CLINIC | Age: 57
End: 2022-03-16
Payer: MEDICAID

## 2022-03-16 ENCOUNTER — TELEPHONE (OUTPATIENT)
Dept: NEUROLOGY | Facility: CLINIC | Age: 57
End: 2022-03-16

## 2022-03-16 VITALS
HEART RATE: 98 BPM | HEIGHT: 65 IN | DIASTOLIC BLOOD PRESSURE: 70 MMHG | OXYGEN SATURATION: 98 % | SYSTOLIC BLOOD PRESSURE: 122 MMHG | BODY MASS INDEX: 33.32 KG/M2 | WEIGHT: 200 LBS | RESPIRATION RATE: 16 BRPM

## 2022-03-16 DIAGNOSIS — F41.9 ANXIETY: ICD-10-CM

## 2022-03-16 DIAGNOSIS — G44.329 CHRONIC POST-TRAUMATIC HEADACHE, NOT INTRACTABLE: Primary | ICD-10-CM

## 2022-03-16 DIAGNOSIS — Z71.89 GRIEF COUNSELING: ICD-10-CM

## 2022-03-16 DIAGNOSIS — E66.9 OBESITY (BMI 30-39.9): ICD-10-CM

## 2022-03-16 PROCEDURE — 99214 OFFICE O/P EST MOD 30 MIN: CPT | Performed by: OTHER

## 2022-03-16 PROCEDURE — 3078F DIAST BP <80 MM HG: CPT | Performed by: OTHER

## 2022-03-16 PROCEDURE — 3008F BODY MASS INDEX DOCD: CPT | Performed by: OTHER

## 2022-03-16 PROCEDURE — 3074F SYST BP LT 130 MM HG: CPT | Performed by: OTHER

## 2022-03-16 RX ORDER — BUTALBITAL, ACETAMINOPHEN AND CAFFEINE 50; 325; 40 MG/1; MG/1; MG/1
1 TABLET ORAL 2 TIMES DAILY PRN
Qty: 60 TABLET | Refills: 0 | Status: SHIPPED | OUTPATIENT
Start: 2022-03-16

## 2022-03-16 RX ORDER — DIAZEPAM 5 MG/1
5 TABLET ORAL EVERY 12 HOURS PRN
Qty: 60 TABLET | Refills: 0 | Status: SHIPPED | OUTPATIENT
Start: 2022-03-16

## 2022-03-17 ENCOUNTER — TELEPHONE (OUTPATIENT)
Dept: INTERNAL MEDICINE CLINIC | Facility: CLINIC | Age: 57
End: 2022-03-17

## 2022-03-17 ENCOUNTER — MED REC SCAN ONLY (OUTPATIENT)
Dept: INTERNAL MEDICINE CLINIC | Facility: CLINIC | Age: 57
End: 2022-03-17

## 2022-03-17 ENCOUNTER — HOSPITAL ENCOUNTER (OUTPATIENT)
Dept: MAMMOGRAPHY | Facility: HOSPITAL | Age: 57
Discharge: HOME | End: 2022-03-17
Attending: INTERNAL MEDICINE
Payer: MEDICAID

## 2022-03-17 DIAGNOSIS — R92.2 INCONCLUSIVE MAMMOGRAM: ICD-10-CM

## 2022-03-17 PROCEDURE — 77061 BREAST TOMOSYNTHESIS UNI: CPT | Performed by: INTERNAL MEDICINE

## 2022-03-17 PROCEDURE — 77065 DX MAMMO INCL CAD UNI: CPT | Performed by: INTERNAL MEDICINE

## 2022-03-17 NOTE — IMAGING NOTE
This Breast Care RN assisted Dr. Domingo Velasco with recommendation for a right breast 2 site stereotactic biopsy for calcifications. Procedure reviewed and all questions answered. Emotional and educational support given. On the day of the biopsy, pt instructed to take Tylenol 1000mg PO, eat a light meal & bring or wear a sports bra. Post biopsy care also reviewed with pt to include NO lifting more than 5lbs, no exercising or housework (limit upper body movement) for 24-48 hrs post biopsy. Patient denies blood thinners, bleeding disorders, liver disease, and chemo. Pt verbalized understanding. Our breast center schedulers will be calling to schedule an appt that is convenient for pt.

## 2022-03-17 NOTE — TELEPHONE ENCOUNTER
Breast core biopsy order from Mammography/breast biopsy scheduling received, placed on Dr Telles New desk for review and signature.

## 2022-03-18 RX ORDER — DIAZEPAM 5 MG/1
5 TABLET ORAL EVERY 12 HOURS PRN
Qty: 60 TABLET | Refills: 0 | OUTPATIENT
Start: 2022-03-18

## 2022-03-18 RX ORDER — BUTALBITAL, ACETAMINOPHEN AND CAFFEINE 50; 325; 40 MG/1; MG/1; MG/1
1 TABLET ORAL 2 TIMES DAILY PRN
Qty: 60 TABLET | Refills: 0 | OUTPATIENT
Start: 2022-03-18

## 2022-03-30 NOTE — TELEPHONE ENCOUNTER
Drug-drug interaction    Please send, if appropriate    Also routed to [facility] for appt assist--telemedicine appt 9/29/2021      Requested Prescriptions   Pending Prescriptions Disp Refills    mirtazapine 15 MG Oral Tab 30 tablet 2     Sig: Take 1 tablet (15 mg total) by mouth nightly.         Psychiatric Non-Scheduled (Anti-Anxiety) Failed - 3/29/2022  8:58 PM        Failed - Appointment in last 6 or next 3 months               Recent Outpatient Visits              2 weeks ago Chronic post-traumatic headache, not intractable    [address] [facility], Vitaly Garcia MD    Office Visit    4 months ago     E[facility] Saeed [address], Karina, PT    Office Visit    4 months ago Leg weakness, bilateral    J. C. Aracely Shaw MD    Telemedicine    5 months ago Sprain of medial collateral ligament of right knee, initial encounter    Tiffany Rahman, PT    Office Visit    5 months ago Sprain of medial collateral ligament of right knee, initial encounter    Tiffany Rahman, PT    Office Visit             Future Appointments         Provider Department Appt Notes    In 1 week Lesli Carrasco MD [facility] - Orthopedics [facility] RT Knee, Follow up after PT for Rt knee    In 1 week [address] [facility] Mammography 2 site right    In 1 month Christy Myers, [address] , [facility], [facility]

## 2022-03-31 RX ORDER — MIRTAZAPINE 15 MG/1
15 TABLET, FILM COATED ORAL NIGHTLY
Qty: 90 TABLET | Refills: 0 | Status: SHIPPED | OUTPATIENT
Start: 2022-03-31

## 2022-04-01 RX ORDER — AMITRIPTYLINE HYDROCHLORIDE 25 MG/1
50 TABLET, FILM COATED ORAL NIGHTLY
Qty: 60 TABLET | Refills: 5 | OUTPATIENT
Start: 2022-04-01

## 2022-04-11 ENCOUNTER — OFFICE VISIT (OUTPATIENT)
Dept: ORTHOPEDICS CLINIC | Facility: CLINIC | Age: 57
End: 2022-04-11
Payer: MEDICAID

## 2022-04-11 VITALS — HEIGHT: 64 IN | WEIGHT: 200 LBS | BODY MASS INDEX: 34.15 KG/M2

## 2022-04-11 DIAGNOSIS — G89.29 CHRONIC PAIN OF RIGHT KNEE: Primary | ICD-10-CM

## 2022-04-11 DIAGNOSIS — M25.561 CHRONIC PAIN OF RIGHT KNEE: Primary | ICD-10-CM

## 2022-04-11 PROCEDURE — 20610 DRAIN/INJ JOINT/BURSA W/O US: CPT | Performed by: ORTHOPAEDIC SURGERY

## 2022-04-11 PROCEDURE — 99213 OFFICE O/P EST LOW 20 MIN: CPT | Performed by: ORTHOPAEDIC SURGERY

## 2022-04-11 PROCEDURE — 3008F BODY MASS INDEX DOCD: CPT | Performed by: ORTHOPAEDIC SURGERY

## 2022-04-11 RX ORDER — TRIAMCINOLONE ACETONIDE 40 MG/ML
40 INJECTION, SUSPENSION INTRA-ARTICULAR; INTRAMUSCULAR ONCE
Status: COMPLETED | OUTPATIENT
Start: 2022-04-11 | End: 2022-04-11

## 2022-04-11 RX ADMIN — TRIAMCINOLONE ACETONIDE 40 MG: 40 INJECTION, SUSPENSION INTRA-ARTICULAR; INTRAMUSCULAR at 08:47:00

## 2022-04-11 NOTE — PROCEDURES
After informed consent, the patient's right knee was marked, locally anesthetized with skin refrigerant, prepped with topical antiseptic, and injected with a mixture of 1mL 40mg/mL Kenalog, 2mL 1% lidocaine and 2mL 0.5% marcaine through the inferolateral portal.  A band-aid was applied. The patient tolerated the procedure well.     Mandi Dela Cruz MD, [address] [place] Orthopedic Surgery  Phone [phone]  Fax [phone]

## 2022-04-11 NOTE — PROGRESS NOTES
[facility] Progress Note    Subjective: Patient following up again for right knee pain, last seen 8 months ago. She states that the brace helps somewhat, physical therapy was somewhat helpful but with a few sessions remaining her therapist did recommend she follow-up with us in orthopedics. She notes that she did have difficulty keeping up with things due to a number of deaths in the family. She continues to have pain along the inside of the knee joint, although she is not using an assist device anymore, and denies any walker usage. She has not been falling either. She is interested in proceeding with a steroid injection as this was discussed as a potential next step at her last visit. Objective: Patient is awake alert no distress, ambulating without assist device. She has some mild tenderness palpation about the medial aspect of the knee joint line, she bends/flexes the knee well past 100/110. Knee is stable to varus and valgus stress, there is no clicking or pain with valgus stress today. Assessment/Plan: 61-year-old female with chronic right knee pain, previously treated for MCL sprain. Symptoms have gotten somewhat better, however still persist with pain along the medial aspect of the knee joint line. She has done physical therapy, wondering what steps could be taken next. We did discuss steroid injection for symptomatic relief, versus advanced imaging given maintained joint spaces on x-ray. She would like to proceed with a steroid injection and this was performed in clinic. If symptom relief is insufficient, she will contact us the next step may be to consider a MRI of the knee.     Jose Rojas MD, [address] Orthopedic Surgery  Phone [phone]  Fax [phone]

## 2022-04-12 ENCOUNTER — HOSPITAL ENCOUNTER (OUTPATIENT)
Dept: MAMMOGRAPHY | Facility: HOSPITAL | Age: 57
Discharge: HOME | End: 2022-04-12
Attending: INTERNAL MEDICINE
Payer: MEDICAID

## 2022-04-12 DIAGNOSIS — R92.1 BREAST CALCIFICATIONS: ICD-10-CM

## 2022-04-12 PROCEDURE — 88305 TISSUE EXAM BY PATHOLOGIST: CPT | Performed by: INTERNAL MEDICINE

## 2022-04-12 PROCEDURE — 19082 BX BREAST ADD LESION STRTCTC: CPT | Performed by: INTERNAL MEDICINE

## 2022-04-12 PROCEDURE — 19081 BX BREAST 1ST LESION STRTCTC: CPT | Performed by: INTERNAL MEDICINE

## 2022-04-13 ENCOUNTER — TELEPHONE (OUTPATIENT)
Dept: MAMMOGRAPHY | Facility: HOSPITAL | Age: 57
End: 2022-04-13

## 2022-04-13 NOTE — TELEPHONE ENCOUNTER
Telephoned Flavio Brown and name,  verified with patient. Notified Flavio Brown of right breast negative for malignancy but positive for AD[facility] biopsy result. Concordance verified by radiologist, Dr. Marie Recio. Flavio Brown reports biopsy site is healing well. Radiologist recommends surgical consultation. Dr Mariza Bergeron office is referring patient to Dr Cindi Villalobos. Patient provided with the contact information for Dr Cindi Villalobos and instructed to call for a consultation appt. Pt verbalized understanding and had no further questions at this time.

## 2022-04-15 RX ORDER — BUTALBITAL, ACETAMINOPHEN AND CAFFEINE 50; 325; 40 MG/1; MG/1; MG/1
TABLET ORAL
Qty: 60 TABLET | Refills: 0 | Status: SHIPPED | OUTPATIENT
Start: 2022-04-15

## 2022-04-15 RX ORDER — DIAZEPAM 5 MG/1
TABLET ORAL
Qty: 60 TABLET | Refills: 0 | Status: SHIPPED | OUTPATIENT
Start: 2022-04-15

## 2022-04-15 NOTE — TELEPHONE ENCOUNTER
Medication:    BUTALBITAL-ACETAMINOPHEN-CAFFEINE -40 MG Oral Tab    [place]: [place]16[place]22    DIAZEPAM 5 MG Oral Tab    [place]: 3/16/22    LOV:3/16/22  NOV:none

## 2022-04-22 ENCOUNTER — LAB ENCOUNTER (OUTPATIENT)
Dept: LAB | Age: 57
End: 2022-04-22
Attending: INTERNAL MEDICINE
Payer: MEDICAID

## 2022-04-22 ENCOUNTER — OFFICE VISIT (OUTPATIENT)
Dept: INTERNAL MEDICINE CLINIC | Facility: CLINIC | Age: 57
End: 2022-04-22
Payer: MEDICAID

## 2022-04-22 VITALS
SYSTOLIC BLOOD PRESSURE: 120 MMHG | DIASTOLIC BLOOD PRESSURE: 80 MMHG | BODY MASS INDEX: 39.55 KG/M2 | HEART RATE: 91 BPM | TEMPERATURE: 98 F | WEIGHT: 231.63 LBS | HEIGHT: 64 IN

## 2022-04-22 DIAGNOSIS — I10 ESSENTIAL HYPERTENSION WITH GOAL BLOOD PRESSURE LESS THAN 140/90: ICD-10-CM

## 2022-04-22 DIAGNOSIS — E55.9 VITAMIN D DEFICIENCY: ICD-10-CM

## 2022-04-22 DIAGNOSIS — N60.91 ATYPICAL DUCTAL HYPERPLASIA OF RIGHT BREAST: ICD-10-CM

## 2022-04-22 DIAGNOSIS — F32.A ANXIETY AND DEPRESSION: ICD-10-CM

## 2022-04-22 DIAGNOSIS — E78.2 MIXED HYPERLIPIDEMIA: ICD-10-CM

## 2022-04-22 DIAGNOSIS — Z00.00 ANNUAL PHYSICAL EXAM: ICD-10-CM

## 2022-04-22 DIAGNOSIS — N28.1 CYST OF RIGHT KIDNEY: ICD-10-CM

## 2022-04-22 DIAGNOSIS — F41.9 ANXIETY AND DEPRESSION: ICD-10-CM

## 2022-04-22 DIAGNOSIS — N18.31 STAGE 3A CHRONIC KIDNEY DISEASE (HCC): ICD-10-CM

## 2022-04-22 DIAGNOSIS — Z00.00 ANNUAL PHYSICAL EXAM: Primary | ICD-10-CM

## 2022-04-22 LAB
ALBUMIN SERPL-MCNC: 3.4 G/DL (ref 3.4–5)
ALBUMIN/GLOB SERPL: 0.9 {RATIO} (ref 1–2)
ALP LIVER SERPL-CCNC: 122 U/L
ALT SERPL-CCNC: 17 U/L
ANION GAP SERPL CALC-SCNC: 4 MMOL/L (ref 0–18)
AST SERPL-CCNC: 12 U/L (ref 15–37)
BASOPHILS # BLD AUTO: 0.04 X10(3) UL (ref 0–0.2)
BASOPHILS NFR BLD AUTO: 0.6 %
BILIRUB SERPL-MCNC: 0.2 MG/DL (ref 0.1–2)
BILIRUB UR QL: NEGATIVE
BUN BLD-MCNC: 16 MG/DL (ref 7–18)
BUN/CREAT SERPL: 13.9 (ref 10–20)
CALCIUM BLD-MCNC: 9.3 MG/DL (ref 8.5–10.1)
CHLORIDE SERPL-SCNC: 110 MMOL/L (ref 98–112)
CHOLEST SERPL-MCNC: 220 MG/DL (ref ?–200)
CO2 SERPL-SCNC: 26 MMOL/L (ref 21–32)
COLOR UR: YELLOW
CREAT BLD-MCNC: 1.15 MG/DL
DEPRECATED RDW RBC AUTO: 48.5 FL (ref 35.1–46.3)
EOSINOPHIL # BLD AUTO: 0.1 X10(3) UL (ref 0–0.7)
EOSINOPHIL NFR BLD AUTO: 1.4 %
ERYTHROCYTE [DISTWIDTH] IN BLOOD BY AUTOMATED COUNT: 14.1 % (ref 11–15)
FASTING PATIENT LIPID ANSWER: YES
FASTING STATUS PATIENT QL REPORTED: YES
GLOBULIN PLAS-MCNC: 3.9 G/DL (ref 2.8–4.4)
GLUCOSE BLD-MCNC: 87 MG/DL (ref 70–99)
GLUCOSE UR-MCNC: NEGATIVE MG/DL
HCT VFR BLD AUTO: 43.2 %
HDLC SERPL-MCNC: 59 MG/DL (ref 40–59)
HGB BLD-MCNC: 13.5 G/DL
HGB UR QL STRIP.AUTO: NEGATIVE
IMM GRANULOCYTES # BLD AUTO: 0.02 X10(3) UL (ref 0–1)
IMM GRANULOCYTES NFR BLD: 0.3 %
KETONES UR-MCNC: NEGATIVE MG/DL
LDLC SERPL CALC-MCNC: 148 MG/DL (ref ?–100)
LYMPHOCYTES # BLD AUTO: 3.74 X10(3) UL (ref 1–4)
LYMPHOCYTES NFR BLD AUTO: 51.8 %
MCH RBC QN AUTO: 29.2 PG (ref 26–34)
MCHC RBC AUTO-ENTMCNC: 31.3 G/DL (ref 31–37)
MCV RBC AUTO: 93.3 FL
MONOCYTES # BLD AUTO: 0.31 X10(3) UL (ref 0.1–1)
MONOCYTES NFR BLD AUTO: 4.3 %
NEUTROPHILS # BLD AUTO: 3.01 X10 (3) UL (ref 1.5–7.7)
NEUTROPHILS # BLD AUTO: 3.01 X10(3) UL (ref 1.5–7.7)
NEUTROPHILS NFR BLD AUTO: 41.6 %
NITRITE UR QL STRIP.AUTO: NEGATIVE
NONHDLC SERPL-MCNC: 161 MG/DL (ref ?–130)
OSMOLALITY SERPL CALC.SUM OF ELEC: 291 MOSM/KG (ref 275–295)
PH UR: 5 [PH] (ref 5–8)
PLATELET # BLD AUTO: 282 10(3)UL (ref 150–450)
POTASSIUM SERPL-SCNC: 4.9 MMOL/L (ref 3.5–5.1)
PROT SERPL-MCNC: 7.3 G/DL (ref 6.4–8.2)
PROT UR-MCNC: NEGATIVE MG/DL
PTH-INTACT SERPL-MCNC: 48.6 PG/ML (ref 18.5–88)
RBC # BLD AUTO: 4.63 X10(6)UL
SODIUM SERPL-SCNC: 140 MMOL/L (ref 136–145)
SP GR UR STRIP: 1.02 (ref 1–1.03)
TRIGL SERPL-MCNC: 75 MG/DL (ref 30–149)
UROBILINOGEN UR STRIP-ACNC: <2
VIT C UR-MCNC: NEGATIVE MG/DL
VIT D+METAB SERPL-MCNC: 25.3 NG/ML (ref 30–100)
VLDLC SERPL CALC-MCNC: 14 MG/DL (ref 0–30)
WBC # BLD AUTO: 7.2 X10(3) UL (ref 4–11)

## 2022-04-22 PROCEDURE — 3074F SYST BP LT 130 MM HG: CPT | Performed by: INTERNAL MEDICINE

## 2022-04-22 PROCEDURE — 3079F DIAST BP 80-89 MM HG: CPT | Performed by: INTERNAL MEDICINE

## 2022-04-22 PROCEDURE — 3008F BODY MASS INDEX DOCD: CPT | Performed by: INTERNAL MEDICINE

## 2022-04-22 PROCEDURE — 36415 COLL VENOUS BLD VENIPUNCTURE: CPT

## 2022-04-22 PROCEDURE — 80053 COMPREHEN METABOLIC PANEL: CPT

## 2022-04-22 PROCEDURE — 80061 LIPID PANEL: CPT

## 2022-04-22 PROCEDURE — 82306 VITAMIN D 25 HYDROXY: CPT

## 2022-04-22 PROCEDURE — 99396 PREV VISIT EST AGE 40-64: CPT | Performed by: INTERNAL MEDICINE

## 2022-04-22 PROCEDURE — 85025 COMPLETE CBC W/AUTO DIFF WBC: CPT

## 2022-04-22 PROCEDURE — 81001 URINALYSIS AUTO W/SCOPE: CPT

## 2022-04-22 PROCEDURE — 83970 ASSAY OF PARATHORMONE: CPT

## 2022-05-03 RX ORDER — GABAPENTIN 300 MG/1
300 CAPSULE ORAL 4 TIMES DAILY
Qty: 360 CAPSULE | Refills: 1 | Status: SHIPPED | OUTPATIENT
Start: 2022-05-03

## 2022-05-03 RX ORDER — AMLODIPINE BESYLATE 10 MG/1
10 TABLET ORAL NIGHTLY
Qty: 90 TABLET | Refills: 1 | Status: SHIPPED | OUTPATIENT
Start: 2022-05-03

## 2022-05-03 NOTE — TELEPHONE ENCOUNTER
Refill passed per [place] [employer] [facility] protocol. Requested Prescriptions   Pending Prescriptions Disp Refills    amLODIPine 10 MG Oral Tab 90 tablet 0     Sig: Take 1 tablet (10 mg total) by mouth nightly. Hypertensive Medications Protocol Passed - 5/3/2022  7:40 AM        Passed - CMP or BMP in past 12 months        Passed - Appointment in past 6 or next 3 months        Passed - GFR  > 50     Lab Results   Component Value Date    GFRAA 62 04/22/2022                    gabapentin 300 MG Oral Cap 360 capsule 0     Sig: Take 1 capsule (300 mg total) by mouth 4 (four) times daily.         Neurology Medications Passed - 5/3/2022  7:40 AM        Passed - Appointment in the past 6 or next 3 months               Recent Outpatient Visits              1 week ago Annual physical exam    [address] Elizabeth MD    Office Visit    3 weeks ago Chronic pain of right knee    [address] Michaela Spurling, MD    Office Visit    1 month ago Chronic post-traumatic headache, not intractable    Amrk Stuart, Yaneth Perez MD    Office Visit    5 months ago     Eileen Saeed [address], [address], PT    Office Visit    6 months ago Leg weakness, bilateral    [facility] Racquel MD Gretchen    Telemedicine            Future Appointments         Provider Department Appt Notes    In 3 days Sophie Yadav MD [facility] *pt arriving @ 8:00 for paperwork  [facility]  [address] referring    In 1 week Deann Pritchett, [address] , [facility], [address]ital[address] Stokes     In 1 month Eileen Snowden [address]

## 2022-05-11 ENCOUNTER — OFFICE VISIT (OUTPATIENT)
Dept: SURGERY | Facility: CLINIC | Age: 57
End: 2022-05-11
Payer: MEDICAID

## 2022-05-11 VITALS
OXYGEN SATURATION: 97 % | TEMPERATURE: 97 F | WEIGHT: 232.38 LBS | RESPIRATION RATE: 18 BRPM | BODY MASS INDEX: 40 KG/M2 | SYSTOLIC BLOOD PRESSURE: 125 MMHG | HEART RATE: 89 BPM | DIASTOLIC BLOOD PRESSURE: 87 MMHG

## 2022-05-11 DIAGNOSIS — N60.91 ATYPICAL DUCTAL HYPERPLASIA OF RIGHT BREAST: Primary | ICD-10-CM

## 2022-05-11 DIAGNOSIS — F41.9 ANXIETY: ICD-10-CM

## 2022-05-11 DIAGNOSIS — G44.329 CHRONIC POST-TRAUMATIC HEADACHE, NOT INTRACTABLE: ICD-10-CM

## 2022-05-11 PROCEDURE — 3074F SYST BP LT 130 MM HG: CPT | Performed by: SURGERY

## 2022-05-11 PROCEDURE — 3079F DIAST BP 80-89 MM HG: CPT | Performed by: SURGERY

## 2022-05-11 PROCEDURE — 99244 OFF/OP CNSLTJ NEW/EST MOD 40: CPT | Performed by: SURGERY

## 2022-05-11 RX ORDER — BUTALBITAL, ACETAMINOPHEN AND CAFFEINE 50; 325; 40 MG/1; MG/1; MG/1
1 TABLET ORAL 2 TIMES DAILY PRN
Qty: 60 TABLET | Refills: 0 | Status: SHIPPED | OUTPATIENT
Start: 2022-05-11 | End: 2022-06-11

## 2022-05-11 RX ORDER — DIAZEPAM 5 MG/1
5 TABLET ORAL EVERY 12 HOURS PRN
Qty: 60 TABLET | Refills: 0 | Status: SHIPPED | OUTPATIENT
Start: 2022-05-11 | End: 2022-06-11

## 2022-05-11 NOTE — PATIENT INSTRUCTIONS
Dr. Luis Manuel Victoria  Tel: [phone]  Fax: [facility] [address], [address] [facility]  [phone]     Surgery/Procedure: Right breast wire localized excisional biopsy     Anesthesia:   MAC  Surgery Length:   45 minutes CPT: 85944    Wire LOC:   Yes Nuc Med:   No   Lorie Seed:  No       Dx & ICD-10: Atypical ductal hyperplasia of right breast (N60.91)   Radiology Instructions: Right breast, 4 o'clock position, top hat shaped clip, biopsy confirms atypical ductal hyperplasia.    _______________________________________________________________________________    1. Someone must accompany you the day of the procedure to drive you home safely, because of anesthesia. 2. You must remove any kind of makeup, nail polish, lotions, powders, creams or deodorant. 3. [place] ONLY: Pre-admission will give instruct you on when to take Gatorade and Tylenol/acetaminophen prior to your surgery, purchase 2 - 12oz bottles of regular Gatorade (NOT RED/SUGAR FREE). Otherwise, you may not eat or drink anything else after 11PM the night before surgery. 4. [place] ONLY: You may not eat or drink anything after midnight the day of your surgery. 5. Wear comfortable clothing that can be easily removed. 6. If you wear dentures, contacts lenses, or any prosthesis, you will be asked to remove them. 7. Do not drink alcohol or smoke 24 hours prior to your procedure. 8. Bring a picture ID and your insurance card. 9. You will be contacted by the hospital for Pre-Admission Covid-19 testing (regardless of vaccination status) to be scheduled as an appointment prior to surgery. They will call closer to the surgery date to set this up, because the earliest this can be done is 72 hours prior to surgery. 10. The Pre-Admission Testing Department will call the day before to confirm your procedure, give you the time you need to arrive by and directions on where to go.  They begin making calls after 2pm, if you are not contacted by 4pm, please call the surgeon's office listed above. 11. Do not take any blood thinners at least one week prior to the procedure/surgery. This includes aspirin, baby aspirin, Ibuprofen products, herbal supplements, diet medications, vitamin E, fish oil and green tea supplements. Please check other supplements for these ingredients. *TYLENOL or acetaminophen is acceptable*  12. If you take Coumadin, Plavix, Xarelto, or Eliquis, please contact your prescribing physician for special instructions on how long to hold. If you take insulin contact your primary care physician for special instructions. 15. Our surgery scheduler, David Ravi, will be contacting you to discuss surgery dates. If you have any questions related to scheduling your surgery, please reach out to her at [phone].  _____________________________________________________________________  PRE-OPERATIVE TESTING IF INDICATED BELOW  PLEASE COMPLETE ASAP (AT LEAST 7-10 DAYS PRIOR TO SURGERY)  [] CBC [x] BMP [] CMP [x] EKG    [] PT, PTT, INR [] Cardiac Clearance  [x] H&P Medical Clearance [] Chest X-ray     Please call Central Scheduling to schedule an appointment for pre-operative labs/tests @ [phone]    Does the patient have a pacemaker or ICD?      [] Yes   [x] No

## 2022-05-16 ENCOUNTER — OFFICE VISIT (OUTPATIENT)
Dept: INTERNAL MEDICINE CLINIC | Facility: CLINIC | Age: 57
End: 2022-05-16
Payer: MEDICAID

## 2022-05-16 ENCOUNTER — TELEPHONE (OUTPATIENT)
Dept: SURGERY | Facility: CLINIC | Age: 57
End: 2022-05-16

## 2022-05-16 VITALS
OXYGEN SATURATION: 97 % | BODY MASS INDEX: 41.09 KG/M2 | SYSTOLIC BLOOD PRESSURE: 126 MMHG | HEART RATE: 107 BPM | RESPIRATION RATE: 17 BRPM | WEIGHT: 229 LBS | DIASTOLIC BLOOD PRESSURE: 82 MMHG | HEIGHT: 62.5 IN

## 2022-05-16 DIAGNOSIS — F17.210 CIGARETTE SMOKER: ICD-10-CM

## 2022-05-16 DIAGNOSIS — F41.1 GAD (GENERALIZED ANXIETY DISORDER): ICD-10-CM

## 2022-05-16 DIAGNOSIS — R56.9 SEIZURE (HCC): ICD-10-CM

## 2022-05-16 DIAGNOSIS — E78.2 MIXED HYPERLIPIDEMIA: ICD-10-CM

## 2022-05-16 DIAGNOSIS — E55.9 VITAMIN D DEFICIENCY: ICD-10-CM

## 2022-05-16 DIAGNOSIS — E66.01 MORBID OBESITY WITH BMI OF 40.0-44.9, ADULT (HCC): ICD-10-CM

## 2022-05-16 DIAGNOSIS — I10 ESSENTIAL HYPERTENSION WITH GOAL BLOOD PRESSURE LESS THAN 140/90: ICD-10-CM

## 2022-05-16 DIAGNOSIS — R06.83 SNORING: ICD-10-CM

## 2022-05-16 DIAGNOSIS — Z51.81 THERAPEUTIC DRUG MONITORING: Primary | ICD-10-CM

## 2022-05-16 PROBLEM — S82.891A CLOSED RIGHT ANKLE FRACTURE: Status: ACTIVE | Noted: 2017-04-19

## 2022-05-16 PROBLEM — S82.409A CLOSED FRACTURE OF PART OF FIBULA: Status: ACTIVE | Noted: 2017-04-19

## 2022-05-16 PROCEDURE — 3079F DIAST BP 80-89 MM HG: CPT | Performed by: NURSE PRACTITIONER

## 2022-05-16 PROCEDURE — 3074F SYST BP LT 130 MM HG: CPT | Performed by: NURSE PRACTITIONER

## 2022-05-16 PROCEDURE — 3008F BODY MASS INDEX DOCD: CPT | Performed by: NURSE PRACTITIONER

## 2022-05-16 PROCEDURE — 99214 OFFICE O/P EST MOD 30 MIN: CPT | Performed by: NURSE PRACTITIONER

## 2022-05-16 NOTE — TELEPHONE ENCOUNTER
Calling pt in regards to scheduling surgery. Informed pt that I have 06/17/2022 available at [facility] with Dr. Viry Grullon. Pt verbalized understanding and in agreement with date and location. All questions answered. Encouraged pt to call or [place] message office with any other questions or concerns.

## 2022-05-16 NOTE — PATIENT INSTRUCTIONS
We are here to support you with weight loss, but please remember that you still need your primary care provider for your routine health maintenance. PLAN:  Can check out plenity (online to see if that would be something she would like to try for medications)   Try to quit and stop smoking  Think about doing a sleep study to charan sleep apnea   Follow up with me in 5 weeks  Schedule follow up appointments: Carmen Louise (dietitian) or Ofelia Stubbs (presurgery dietitian)   Check for insurance coverage for dietitian and labwork prior to scheduling appointment. Please try to work on the following dietary changes:  1. Goals: Aim for 20-30 grams of protein/ meal  i. Aim for 120 grams of carbohydrates/day  ii. Eat 4-6 vegetables/day  iii. Avoid skipping meals- eat every 4-5 hours  iv. Aim for 3 meals/day  2. Drink lots of water and cut down on soda/juice consumption if soda/juice drinker  3. Focus on protein: (15-30 grams with each meal) ie. greek yogurt, cottage cheese, string cheese, hard boiled eggs  4. Healthy snacks: peanut butter and apples, hummus and carrots, berries, nuts (1/4 cup), tuna and crackers                 Protein Shakes: Premier protein or Core Power                Protein Bars: Rx Bars, Oatmega, Power Crunch                 Sargento balanced breaks (cheese and nuts)- without chocolate  5. Reduce carbohydrates which includes sweets as well as rice, pasta, potatoes, bread, corn and instead choose whole grain options or more protein or vegetables (4-6 servings of vegetables per day)  6. Get a good night of sleep  7. Try to decrease stress in life     Please download apps:  1. \"My Fitness Pal\" (other option is Lose it)) to help you to monitor daily dietary intake and you will be able to see if you are eating the right amount of calories, protein, carbs                With My Fitness Pal-->When you set-up the peyton or need to adjust settings:                Goals should include:                  Lose 1.5-2 lbs per week                Activity level: not very active (can't count exercise towards calorie number per day)                   ** Daily INPUT> Look at nutrition section-- \"nutrients\" and it will break down your macros for the day (ie. Protein, carbs, fibers, sugars and fats). Try to stay within these numbers daily     2. \"7 minute workout\" to help with exercise/activity which takes 7 minutes of your day and that you can do at home! 3. \"Calm\" or \"Headspace\" which helps with mindfulness, meditation, clarity, sleep, and hieu to your daily life. 4. [employer] blog for healthy recipe ideas  5. "[employer]" for low carb resources    HIGH PROTEIN SNACK IDEAS  -cottage cheese  -plain yogurt  -kefir  -hard-boiled eggs  -natural cheeses  -nuts (measure portion size)   -unsweetened nut butters  -dried edamame   -murali seeds soaked in water or almond milk  -soy nuts  -cured meats (monitor for sodium issues)   -hummus with vegetables  -bean dip with vegetables     FRUIT  Low carb fruit options   Raspberries: Half a cup (60 grams) contains 3 grams of carbs. Blackberries: Half a cup (70 grams) contains 4 grams of carbs. Strawberries: Half a cup (100 grams) contains 6 grams of carbs. Blueberries: Half a cup (50 grams) contains 6 grams of carbs. Plum: One medium-sized (80 grams) contains 6 grams of carbs.      VEGETABLES  Low carb vegetables

## 2022-05-31 ENCOUNTER — TELEPHONE (OUTPATIENT)
Dept: GENERAL RADIOLOGY | Facility: HOSPITAL | Age: 57
End: 2022-05-31

## 2022-06-03 ENCOUNTER — TELEPHONE (OUTPATIENT)
Dept: GENERAL RADIOLOGY | Facility: HOSPITAL | Age: 57
End: 2022-06-03

## 2022-06-06 ENCOUNTER — TELEPHONE (OUTPATIENT)
Dept: MAMMOGRAPHY | Facility: HOSPITAL | Age: 57
End: 2022-06-06

## 2022-06-06 NOTE — TELEPHONE ENCOUNTER
Attempted to call (3rd attempt) re: breast wire localization procedure education. Message left for patient to call back.

## 2022-06-08 ENCOUNTER — HOSPITAL ENCOUNTER (OUTPATIENT)
Dept: ULTRASOUND IMAGING | Facility: HOSPITAL | Age: 57
Discharge: HOME | End: 2022-06-08
Attending: INTERNAL MEDICINE
Payer: MEDICAID

## 2022-06-08 DIAGNOSIS — N28.1 CYST OF RIGHT KIDNEY: ICD-10-CM

## 2022-06-08 PROCEDURE — 76775 US EXAM ABDO BACK WALL LIM: CPT | Performed by: INTERNAL MEDICINE

## 2022-06-10 ENCOUNTER — TELEPHONE (OUTPATIENT)
Dept: INTERNAL MEDICINE CLINIC | Facility: CLINIC | Age: 57
End: 2022-06-10

## 2022-06-10 NOTE — TELEPHONE ENCOUNTER
Spoke, with the patient and she did schedule a pre op clearance with Dr. Master Weathers for tomorrow at 10:30 per the doctors request below.

## 2022-06-10 NOTE — TELEPHONE ENCOUNTER
Pt returned call, 6/8/22 encounter message relayed to pt and transferred to  to assist with pre-op medical clearance appt.     Please reply to pool: ALEXIS Mandujano Number

## 2022-06-10 NOTE — TELEPHONE ENCOUNTER
Patient is scheduled for surgery 06/17/22, earliest appointment available is 06/20/22.  Patient requesting sooner appt for pre-op clearance,

## 2022-06-11 ENCOUNTER — LAB ENCOUNTER (OUTPATIENT)
Dept: LAB | Age: 57
End: 2022-06-11
Attending: INTERNAL MEDICINE
Payer: MEDICAID

## 2022-06-11 ENCOUNTER — OFFICE VISIT (OUTPATIENT)
Dept: INTERNAL MEDICINE CLINIC | Facility: CLINIC | Age: 57
End: 2022-06-11
Payer: MEDICAID

## 2022-06-11 VITALS
HEIGHT: 64 IN | DIASTOLIC BLOOD PRESSURE: 80 MMHG | HEART RATE: 80 BPM | WEIGHT: 229 LBS | BODY MASS INDEX: 39.09 KG/M2 | SYSTOLIC BLOOD PRESSURE: 124 MMHG

## 2022-06-11 DIAGNOSIS — F17.210 CIGARETTE SMOKER: ICD-10-CM

## 2022-06-11 DIAGNOSIS — E78.2 MIXED HYPERLIPIDEMIA: ICD-10-CM

## 2022-06-11 DIAGNOSIS — R89.9 ABNORMAL LABORATORY TEST RESULT: ICD-10-CM

## 2022-06-11 DIAGNOSIS — F41.9 ANXIETY: ICD-10-CM

## 2022-06-11 DIAGNOSIS — Z01.818 PREOP GENERAL PHYSICAL EXAM: ICD-10-CM

## 2022-06-11 DIAGNOSIS — E55.9 VITAMIN D DEFICIENCY: ICD-10-CM

## 2022-06-11 DIAGNOSIS — G44.329 CHRONIC POST-TRAUMATIC HEADACHE, NOT INTRACTABLE: ICD-10-CM

## 2022-06-11 DIAGNOSIS — I10 PRIMARY HYPERTENSION: ICD-10-CM

## 2022-06-11 DIAGNOSIS — Z01.818 PREOP EXAMINATION: ICD-10-CM

## 2022-06-11 DIAGNOSIS — R56.9 SEIZURE (HCC): ICD-10-CM

## 2022-06-11 DIAGNOSIS — N60.91 ATYPICAL HYPERPLASIA OF RIGHT BREAST: ICD-10-CM

## 2022-06-11 DIAGNOSIS — Z00.00 ROUTINE GENERAL MEDICAL EXAMINATION AT A HEALTH CARE FACILITY: Primary | ICD-10-CM

## 2022-06-11 DIAGNOSIS — Z01.818 PREOP GENERAL PHYSICAL EXAM: Primary | ICD-10-CM

## 2022-06-11 LAB
ALBUMIN SERPL-MCNC: 3.3 G/DL (ref 3.4–5)
ALBUMIN/GLOB SERPL: 0.8 {RATIO} (ref 1–2)
ALP LIVER SERPL-CCNC: 110 U/L
ALT SERPL-CCNC: 18 U/L
ANION GAP SERPL CALC-SCNC: 9 MMOL/L (ref 0–18)
AST SERPL-CCNC: 16 U/L (ref 15–37)
BILIRUB SERPL-MCNC: 0.2 MG/DL (ref 0.1–2)
BUN BLD-MCNC: 13 MG/DL (ref 7–18)
BUN/CREAT SERPL: 13 (ref 10–20)
CALCIUM BLD-MCNC: 9.2 MG/DL (ref 8.5–10.1)
CHLORIDE SERPL-SCNC: 110 MMOL/L (ref 98–112)
CHOLEST SERPL-MCNC: 213 MG/DL (ref ?–200)
CO2 SERPL-SCNC: 20 MMOL/L (ref 21–32)
CREAT BLD-MCNC: 1 MG/DL
FASTING PATIENT LIPID ANSWER: YES
FASTING STATUS PATIENT QL REPORTED: YES
GLOBULIN PLAS-MCNC: 4.4 G/DL (ref 2.8–4.4)
GLUCOSE BLD-MCNC: 101 MG/DL (ref 70–99)
HDLC SERPL-MCNC: 50 MG/DL (ref 40–59)
LDLC SERPL CALC-MCNC: 147 MG/DL (ref ?–100)
NONHDLC SERPL-MCNC: 163 MG/DL (ref ?–130)
OSMOLALITY SERPL CALC.SUM OF ELEC: 288 MOSM/KG (ref 275–295)
POTASSIUM SERPL-SCNC: 4.2 MMOL/L (ref 3.5–5.1)
PROT SERPL-MCNC: 7.7 G/DL (ref 6.4–8.2)
SODIUM SERPL-SCNC: 139 MMOL/L (ref 136–145)
TRIGL SERPL-MCNC: 87 MG/DL (ref 30–149)
VIT D+METAB SERPL-MCNC: 28.5 NG/ML (ref 30–100)
VLDLC SERPL CALC-MCNC: 16 MG/DL (ref 0–30)

## 2022-06-11 PROCEDURE — 93005 ELECTROCARDIOGRAM TRACING: CPT

## 2022-06-11 PROCEDURE — 81001 URINALYSIS AUTO W/SCOPE: CPT | Performed by: INTERNAL MEDICINE

## 2022-06-11 PROCEDURE — 87186 SC STD MICRODIL/AGAR DIL: CPT | Performed by: INTERNAL MEDICINE

## 2022-06-11 PROCEDURE — 80061 LIPID PANEL: CPT

## 2022-06-11 PROCEDURE — 82306 VITAMIN D 25 HYDROXY: CPT

## 2022-06-11 PROCEDURE — 87086 URINE CULTURE/COLONY COUNT: CPT | Performed by: INTERNAL MEDICINE

## 2022-06-11 PROCEDURE — 3008F BODY MASS INDEX DOCD: CPT | Performed by: INTERNAL MEDICINE

## 2022-06-11 PROCEDURE — 3079F DIAST BP 80-89 MM HG: CPT | Performed by: INTERNAL MEDICINE

## 2022-06-11 PROCEDURE — 36415 COLL VENOUS BLD VENIPUNCTURE: CPT

## 2022-06-11 PROCEDURE — 80053 COMPREHEN METABOLIC PANEL: CPT

## 2022-06-11 PROCEDURE — 87077 CULTURE AEROBIC IDENTIFY: CPT | Performed by: INTERNAL MEDICINE

## 2022-06-11 PROCEDURE — 99214 OFFICE O/P EST MOD 30 MIN: CPT | Performed by: INTERNAL MEDICINE

## 2022-06-11 PROCEDURE — 3074F SYST BP LT 130 MM HG: CPT | Performed by: INTERNAL MEDICINE

## 2022-06-11 PROCEDURE — 93010 ELECTROCARDIOGRAM REPORT: CPT | Performed by: INTERNAL MEDICINE

## 2022-06-12 DIAGNOSIS — G44.329 CHRONIC POST-TRAUMATIC HEADACHE, NOT INTRACTABLE: ICD-10-CM

## 2022-06-12 DIAGNOSIS — F41.9 ANXIETY: ICD-10-CM

## 2022-06-13 ENCOUNTER — TELEPHONE (OUTPATIENT)
Dept: MAMMOGRAPHY | Facility: HOSPITAL | Age: 57
End: 2022-06-13

## 2022-06-13 ENCOUNTER — TELEPHONE (OUTPATIENT)
Dept: INTERNAL MEDICINE CLINIC | Facility: CLINIC | Age: 57
End: 2022-06-13

## 2022-06-13 RX ORDER — BUTALBITAL, ACETAMINOPHEN AND CAFFEINE 50; 325; 40 MG/1; MG/1; MG/1
TABLET ORAL
Qty: 60 TABLET | Refills: 0 | OUTPATIENT
Start: 2022-06-13

## 2022-06-13 RX ORDER — DIAZEPAM 5 MG/1
TABLET ORAL
Qty: 60 TABLET | Refills: 0 | OUTPATIENT
Start: 2022-06-13

## 2022-06-13 RX ORDER — BUTALBITAL, ACETAMINOPHEN AND CAFFEINE 50; 325; 40 MG/1; MG/1; MG/1
1 TABLET ORAL 2 TIMES DAILY PRN
Qty: 60 TABLET | Refills: 2 | Status: SHIPPED | OUTPATIENT
Start: 2022-06-13

## 2022-06-13 RX ORDER — NITROFURANTOIN 25; 75 MG/1; MG/1
100 CAPSULE ORAL 2 TIMES DAILY
Qty: 14 CAPSULE | Refills: 0 | Status: SHIPPED | OUTPATIENT
Start: 2022-06-13

## 2022-06-13 RX ORDER — DIAZEPAM 5 MG/1
5 TABLET ORAL EVERY 12 HOURS PRN
Qty: 60 TABLET | Refills: 0 | Status: SHIPPED | OUTPATIENT
Start: 2022-06-13

## 2022-06-13 NOTE — TELEPHONE ENCOUNTER
Spoke with patient (identified name and ), results reviewed and agrees with plan.   Pt stated she takes 1000 units daily

## 2022-06-13 NOTE — TELEPHONE ENCOUNTER
pls call pt  Her urine culture did show she has uti; start macrobid 100mg po bid for one week. erx sent  Her preop chem ok ; alk phos is improving; vit D slightly low, seh needs to take vit D3 at least 1000 units daily as maintenance  Cholesterol levels are better. preop ekg unchanged from old ekg from before. She can proceed with her surgery this week as planned.

## 2022-06-13 NOTE — TELEPHONE ENCOUNTER
Phoned Aixa Gutierrez regarding needle localization process of breast for lumpectomy scheduled for 6-17-22 with Dr. Talon Ritter. Procedure explained and all questions answered. Pt to be transported via W/C through [facility] to [facility] in [facility]. Pt verbalized understanding and had no further questions at this time.

## 2022-06-13 NOTE — TELEPHONE ENCOUNTER
Attempted to call patient (4th attempt) re: breast wire localization procedure education. Message left for patient to call back. no

## 2022-06-15 ENCOUNTER — ANESTHESIA EVENT (OUTPATIENT)
Dept: SURGERY | Facility: HOSPITAL | Age: 57
End: 2022-06-15
Payer: MEDICAID

## 2022-06-15 ENCOUNTER — LAB ENCOUNTER (OUTPATIENT)
Dept: LAB | Facility: HOSPITAL | Age: 57
End: 2022-06-15
Attending: SURGERY
Payer: MEDICAID

## 2022-06-15 DIAGNOSIS — N60.91 ATYPICAL DUCTAL HYPERPLASIA OF RIGHT BREAST: ICD-10-CM

## 2022-06-15 LAB — SARS-COV-2 RNA RESP QL NAA+PROBE: NOT DETECTED

## 2022-06-17 ENCOUNTER — HOSPITAL ENCOUNTER (OUTPATIENT)
Dept: MAMMOGRAPHY | Facility: HOSPITAL | Age: 57
Discharge: HOME | End: 2022-06-17
Attending: SURGERY
Payer: MEDICAID

## 2022-06-17 ENCOUNTER — ANESTHESIA (OUTPATIENT)
Dept: SURGERY | Facility: HOSPITAL | Age: 57
End: 2022-06-17
Payer: MEDICAID

## 2022-06-17 ENCOUNTER — HOSPITAL ENCOUNTER (OUTPATIENT)
Facility: HOSPITAL | Age: 57
Setting detail: HOSPITAL OUTPATIENT SURGERY
Discharge: HOME | End: 2022-06-17
Attending: SURGERY | Admitting: SURGERY
Payer: MEDICAID

## 2022-06-17 VITALS
BODY MASS INDEX: 38.76 KG/M2 | RESPIRATION RATE: 18 BRPM | HEIGHT: 64 IN | WEIGHT: 227.06 LBS | OXYGEN SATURATION: 98 % | DIASTOLIC BLOOD PRESSURE: 94 MMHG | SYSTOLIC BLOOD PRESSURE: 121 MMHG | HEART RATE: 80 BPM | TEMPERATURE: 97 F

## 2022-06-17 DIAGNOSIS — N60.91 ATYPICAL DUCTAL HYPERPLASIA OF RIGHT BREAST: ICD-10-CM

## 2022-06-17 DIAGNOSIS — N60.91 ATYPICAL DUCTAL HYPERPLASIA OF RIGHT BREAST: Primary | ICD-10-CM

## 2022-06-17 PROCEDURE — 88305 TISSUE EXAM BY PATHOLOGIST: CPT | Performed by: SURGERY

## 2022-06-17 PROCEDURE — 0HBT0ZX EXCISION OF RIGHT BREAST, OPEN APPROACH, DIAGNOSTIC: ICD-10-PCS | Performed by: SURGERY

## 2022-06-17 PROCEDURE — 76098 X-RAY EXAM SURGICAL SPECIMEN: CPT | Performed by: SURGERY

## 2022-06-17 PROCEDURE — 19281 PERQ DEVICE BREAST 1ST IMAG: CPT | Performed by: SURGERY

## 2022-06-17 RX ORDER — PROCHLORPERAZINE EDISYLATE 5 MG/ML
5 INJECTION INTRAMUSCULAR; INTRAVENOUS EVERY 8 HOURS PRN
Status: DISCONTINUED | OUTPATIENT
Start: 2022-06-17 | End: 2022-06-17

## 2022-06-17 RX ORDER — ACETAMINOPHEN 500 MG
1000 TABLET ORAL ONCE AS NEEDED
Status: DISCONTINUED | OUTPATIENT
Start: 2022-06-17 | End: 2022-06-17

## 2022-06-17 RX ORDER — HYDROMORPHONE HYDROCHLORIDE 1 MG/ML
0.4 INJECTION, SOLUTION INTRAMUSCULAR; INTRAVENOUS; SUBCUTANEOUS EVERY 5 MIN PRN
Status: DISCONTINUED | OUTPATIENT
Start: 2022-06-17 | End: 2022-06-17

## 2022-06-17 RX ORDER — HYDROCODONE BITARTRATE AND ACETAMINOPHEN 5; 325 MG/1; MG/1
1-2 TABLET ORAL EVERY 6 HOURS PRN
Qty: 20 TABLET | Refills: 0 | Status: SHIPPED | OUTPATIENT
Start: 2022-06-17

## 2022-06-17 RX ORDER — BUPIVACAINE HYDROCHLORIDE 5 MG/ML
INJECTION, SOLUTION EPIDURAL; INTRACAUDAL AS NEEDED
Status: DISCONTINUED | OUTPATIENT
Start: 2022-06-17 | End: 2022-06-17 | Stop reason: HOSPADM

## 2022-06-17 RX ORDER — CEFAZOLIN SODIUM/WATER 2 G/20 ML
2 SYRINGE (ML) INTRAVENOUS ONCE
Status: DISCONTINUED | OUTPATIENT
Start: 2022-06-17 | End: 2022-06-17

## 2022-06-17 RX ORDER — HYDROMORPHONE HYDROCHLORIDE 1 MG/ML
0.6 INJECTION, SOLUTION INTRAMUSCULAR; INTRAVENOUS; SUBCUTANEOUS EVERY 5 MIN PRN
Status: DISCONTINUED | OUTPATIENT
Start: 2022-06-17 | End: 2022-06-17

## 2022-06-17 RX ORDER — LIDOCAINE HYDROCHLORIDE 10 MG/ML
INJECTION, SOLUTION EPIDURAL; INFILTRATION; INTRACAUDAL; PERINEURAL AS NEEDED
Status: DISCONTINUED | OUTPATIENT
Start: 2022-06-17 | End: 2022-06-17 | Stop reason: SURG

## 2022-06-17 RX ORDER — KETOROLAC TROMETHAMINE 30 MG/ML
INJECTION, SOLUTION INTRAMUSCULAR; INTRAVENOUS AS NEEDED
Status: DISCONTINUED | OUTPATIENT
Start: 2022-06-17 | End: 2022-06-17 | Stop reason: SURG

## 2022-06-17 RX ORDER — CLINDAMYCIN PHOSPHATE 900 MG/50ML
900 INJECTION INTRAVENOUS ONCE
Status: COMPLETED | OUTPATIENT
Start: 2022-06-17 | End: 2022-06-17

## 2022-06-17 RX ORDER — SODIUM CHLORIDE 9 MG/ML
INJECTION, SOLUTION INTRAVENOUS CONTINUOUS
Status: DISCONTINUED | OUTPATIENT
Start: 2022-06-17 | End: 2022-06-17

## 2022-06-17 RX ORDER — HEPARIN SODIUM 5000 [USP'U]/ML
5000 INJECTION, SOLUTION INTRAVENOUS; SUBCUTANEOUS ONCE
Status: COMPLETED | OUTPATIENT
Start: 2022-06-17 | End: 2022-06-17

## 2022-06-17 RX ORDER — KETAMINE HYDROCHLORIDE 50 MG/ML
INJECTION, SOLUTION, CONCENTRATE INTRAMUSCULAR; INTRAVENOUS AS NEEDED
Status: DISCONTINUED | OUTPATIENT
Start: 2022-06-17 | End: 2022-06-17 | Stop reason: SURG

## 2022-06-17 RX ORDER — SCOLOPAMINE TRANSDERMAL SYSTEM 1 MG/1
1 PATCH, EXTENDED RELEASE TRANSDERMAL ONCE
Status: DISCONTINUED | OUTPATIENT
Start: 2022-06-17 | End: 2022-06-17 | Stop reason: HOSPADM

## 2022-06-17 RX ORDER — MEPERIDINE HYDROCHLORIDE 25 MG/ML
12.5 INJECTION INTRAMUSCULAR; INTRAVENOUS; SUBCUTANEOUS AS NEEDED
Status: DISCONTINUED | OUTPATIENT
Start: 2022-06-17 | End: 2022-06-17

## 2022-06-17 RX ORDER — SODIUM CHLORIDE, SODIUM LACTATE, POTASSIUM CHLORIDE, CALCIUM CHLORIDE 600; 310; 30; 20 MG/100ML; MG/100ML; MG/100ML; MG/100ML
INJECTION, SOLUTION INTRAVENOUS CONTINUOUS
Status: DISCONTINUED | OUTPATIENT
Start: 2022-06-17 | End: 2022-06-17

## 2022-06-17 RX ORDER — ONDANSETRON 2 MG/ML
4 INJECTION INTRAMUSCULAR; INTRAVENOUS EVERY 6 HOURS PRN
Status: DISCONTINUED | OUTPATIENT
Start: 2022-06-17 | End: 2022-06-17

## 2022-06-17 RX ORDER — DIAZEPAM 5 MG/1
5 TABLET ORAL AS NEEDED
Status: DISCONTINUED | OUTPATIENT
Start: 2022-06-17 | End: 2022-06-17 | Stop reason: HOSPADM

## 2022-06-17 RX ORDER — NALOXONE HYDROCHLORIDE 0.4 MG/ML
80 INJECTION, SOLUTION INTRAMUSCULAR; INTRAVENOUS; SUBCUTANEOUS AS NEEDED
Status: DISCONTINUED | OUTPATIENT
Start: 2022-06-17 | End: 2022-06-17

## 2022-06-17 RX ORDER — HYDROCODONE BITARTRATE AND ACETAMINOPHEN 5; 325 MG/1; MG/1
1 TABLET ORAL ONCE AS NEEDED
Status: DISCONTINUED | OUTPATIENT
Start: 2022-06-17 | End: 2022-06-17

## 2022-06-17 RX ORDER — HYDROCODONE BITARTRATE AND ACETAMINOPHEN 5; 325 MG/1; MG/1
2 TABLET ORAL ONCE AS NEEDED
Status: DISCONTINUED | OUTPATIENT
Start: 2022-06-17 | End: 2022-06-17

## 2022-06-17 RX ORDER — LIDOCAINE HYDROCHLORIDE AND EPINEPHRINE 10; 10 MG/ML; UG/ML
INJECTION, SOLUTION INFILTRATION; PERINEURAL AS NEEDED
Status: DISCONTINUED | OUTPATIENT
Start: 2022-06-17 | End: 2022-06-17 | Stop reason: HOSPADM

## 2022-06-17 RX ORDER — HYDROMORPHONE HYDROCHLORIDE 1 MG/ML
0.2 INJECTION, SOLUTION INTRAMUSCULAR; INTRAVENOUS; SUBCUTANEOUS EVERY 5 MIN PRN
Status: DISCONTINUED | OUTPATIENT
Start: 2022-06-17 | End: 2022-06-17

## 2022-06-17 RX ORDER — MIDAZOLAM HYDROCHLORIDE 1 MG/ML
1 INJECTION INTRAMUSCULAR; INTRAVENOUS EVERY 5 MIN PRN
Status: DISCONTINUED | OUTPATIENT
Start: 2022-06-17 | End: 2022-06-17

## 2022-06-17 RX ORDER — ACETAMINOPHEN 500 MG
1000 TABLET ORAL ONCE
Status: DISCONTINUED | OUTPATIENT
Start: 2022-06-17 | End: 2022-06-17 | Stop reason: HOSPADM

## 2022-06-17 RX ORDER — MIDAZOLAM HYDROCHLORIDE 1 MG/ML
INJECTION INTRAMUSCULAR; INTRAVENOUS AS NEEDED
Status: DISCONTINUED | OUTPATIENT
Start: 2022-06-17 | End: 2022-06-17 | Stop reason: SURG

## 2022-06-17 RX ADMIN — KETOROLAC TROMETHAMINE 30 MG: 30 INJECTION, SOLUTION INTRAMUSCULAR; INTRAVENOUS at 11:43:00

## 2022-06-17 RX ADMIN — CLINDAMYCIN PHOSPHATE 900 MG: 900 INJECTION INTRAVENOUS at 11:21:00

## 2022-06-17 RX ADMIN — LIDOCAINE HYDROCHLORIDE 50 MG: 10 INJECTION, SOLUTION EPIDURAL; INFILTRATION; INTRACAUDAL; PERINEURAL at 11:18:00

## 2022-06-17 RX ADMIN — MIDAZOLAM HYDROCHLORIDE 2 MG: 1 INJECTION INTRAMUSCULAR; INTRAVENOUS at 11:15:00

## 2022-06-17 RX ADMIN — SODIUM CHLORIDE, SODIUM LACTATE, POTASSIUM CHLORIDE, CALCIUM CHLORIDE: 600; 310; 30; 20 INJECTION, SOLUTION INTRAVENOUS at 11:50:00

## 2022-06-17 RX ADMIN — KETAMINE HYDROCHLORIDE 25 MG: 50 INJECTION, SOLUTION, CONCENTRATE INTRAMUSCULAR; INTRAVENOUS at 11:23:00

## 2022-06-17 RX ADMIN — SODIUM CHLORIDE, SODIUM LACTATE, POTASSIUM CHLORIDE, CALCIUM CHLORIDE: 600; 310; 30; 20 INJECTION, SOLUTION INTRAVENOUS at 11:14:00

## 2022-06-17 NOTE — IMAGING NOTE
Assisted  with mammography guided needle localization of the right breast.   Mitchell Jauregui identified with spelling of name and date of birth. Medications and allergies reviewed. The following allergies were reported: Baby OilRASH, RESPIRATORY FAILURE   ClemizoleANAPHYLAXIS   MosquitosRESPIRATORY FAILURE   Orange Juice [Orange Oil]RESPIRATORY FAILURE   PenicillinsANAPHYLAXIS    History: Atypical ductal hyperplasia of right breast  Surgery: Right breast wire localized excisional biopsy    Order verified. Procedure explained and questions answered. Mitchell Jauregui verbalized understanding and agreement. 9119: Written consent obtained. Scans taken by Chu Garces- mammography technologist    [phone]: Dr. Michelle Gunn  present    1003: Time out complete    1009: Site prepped in a sterile manner. 1010: Lidocaine administered for anesthetic affect. 1010: Raines 20G x 7cm needle placed right breast 4 o'clock position, top hat shaped clip, biopsy confirmed atypical ductal hyperplasia. Emotional support provided. Mitchell Jauregui tolerated procedure well. Site cleaned. Wire secured with blue clip, steri strips, sterile 4x4 gauze dressing, and Tegaderm. Mitchell Jauregui transported via wheelchair to pre-op/surgery holding in stable condition. Ms. Jose Adler without complaints or concerns at this time.

## 2022-06-17 NOTE — BRIEF OP NOTE
Pre-Operative Diagnosis: Atypical ductal hyperplasia of right breast [N60.91]     Post-Operative Diagnosis: Atypical ductal hyperplasia of right breast [N60.91]      Procedure Performed:   Right breast wire localized excisional biopsy    Surgeon(s) and Role:     An Putnam MD - Primary    Assistant(s):  Surgical Assistant.: Kylie Azul CSA     Surgical Findings: Clip in xray     Specimen: R lumpectomy     Estimated Blood Loss: Blood Output: 5 mL (6/17/2022 11:42 AM)    Darrin Ng MD  6/17/2022  12:00 PM

## 2022-06-20 NOTE — OPERATIVE REPORT
[facility]    PATIENT'S NAME: Minh Eid   ATTENDING PHYSICIAN: Daren Hylton. Brady Umaña M.D. OPERATING PHYSICIAN: Daren Hylton. Brady Umaña M.D. PATIENT ACCOUNT#:   [de-identified]    [facility]  MEDICAL RECORD #:   [id number]       YOB: 1965  ADMISSION DATE:       06/17/2022      OPERATION DATE:  06/17/2022    OPERATIVE REPORT    PREOPERATIVE DIAGNOSIS:  Atypical hyperplasia of the right breast.  POSTOPERATIVE DIAGNOSIS:  Atypical hyperplasia of the right breast.  PROCEDURE:  Right breast wire localized lumpectomy with right breast specimen radiography. ASSISTANT:  Shira Ravi CSA. ANESTHESIA:  Monitored anesthesia care and local.    ESTIMATED BLOOD LOSS:  5 mL. DRAINS:  None. COMPLICATIONS:  None. DISPOSITION:  Stable on transfer to recovery room. INDICATIONS:  The patient is a 82-year-old female presenting with an imaging-detected concern of the right breast.  She was found to have calcifications and a biopsy confirmed diagnosis of atypical hyperplasia. Secondary to this, we recommended a wire localized lumpectomy to exclude coexisting pathology of residual atypical pathology. Risks and possible complications were discussed with the patient including, but not limited to, infection, bleeding, injury to surrounding structures, possible need for reoperation. She agreed to the proposed surgery. OPERATIVE TECHNIQUE:  Patient was brought to the imaging suite. She underwent a wire localization of the area of concern in the right breast.  She was then brought to the OR, placed in supine position, properly padded and secured. She was given a dose of IV antibiotics, and sequential compression devices were applied to her legs for DVT prophylaxis. Monitored anesthesia care was induced, and the right breast was then prepped and draped in the usual sterile fashion.   Lidocaine 1% with epinephrine was used to infiltrate the skin and subcutaneous tissue at the targeted incision site. Curvilinear incision was made along the superomedial areolar border with a 15 blade knife in the skin. Wire was identified, brought into the field, and using sharp dissection and electrocautery a segment of breast tissue surrounding the tip of the wire was excised, oriented with a short stitch single clip superiorly, long stitch double laterally, placed in the imaging device where specimen x-ray confirmed the presence of the targeted clip and residual area with adequate margins as deemed by myself. A clip was then placed back within the cavity to assist with subsequent surveillance. Wound was irrigated, hemostasis assured with electrocautery and hemoclips. Deep tissue was reapproximated with a running 3-0 PDS suture. Wound was closed with interrupted 3-0 Vicryl for deep layer and running 4-0 subcuticular Monocryl for skin. Mastisol and Steri-Strips were applied. Marcaine 0.5% was instilled in the cavity to assist with postoperative analgesia. Sterile dressing and compression bra were placed. Her blood loss was minimal.  All counts were correct at the conclusion of the procedure. She tolerated the procedure well. She was transferred to the recovery area in stable condition. Dictated By Breanna Hummel.  Loyd Collado M.D.  d: 06/17/2022 12:13:47  t: 06/17/2022 17:57:13  [place] [id number]  [facility]/    cc: Dr. Jeff Evangelista

## 2022-06-27 ENCOUNTER — OFFICE VISIT (OUTPATIENT)
Dept: SURGERY | Facility: CLINIC | Age: 57
End: 2022-06-27
Payer: MEDICAID

## 2022-06-27 VITALS
SYSTOLIC BLOOD PRESSURE: 129 MMHG | OXYGEN SATURATION: 97 % | TEMPERATURE: 97 F | HEART RATE: 95 BPM | DIASTOLIC BLOOD PRESSURE: 97 MMHG | RESPIRATION RATE: 20 BRPM

## 2022-06-27 DIAGNOSIS — R92.8 ABNORMAL MAMMOGRAM OF RIGHT BREAST: Primary | ICD-10-CM

## 2022-06-27 PROCEDURE — 3074F SYST BP LT 130 MM HG: CPT | Performed by: SURGERY

## 2022-06-27 PROCEDURE — 3080F DIAST BP >= 90 MM HG: CPT | Performed by: SURGERY

## 2022-06-27 PROCEDURE — 99024 POSTOP FOLLOW-UP VISIT: CPT | Performed by: SURGERY

## 2022-06-29 DIAGNOSIS — G44.329 CHRONIC POST-TRAUMATIC HEADACHE, NOT INTRACTABLE: ICD-10-CM

## 2022-06-30 RX ORDER — MIRTAZAPINE 15 MG/1
15 TABLET, FILM COATED ORAL NIGHTLY
Qty: 90 TABLET | Refills: 0 | Status: SHIPPED | OUTPATIENT
Start: 2022-06-30

## 2022-07-01 RX ORDER — AMITRIPTYLINE HYDROCHLORIDE 25 MG/1
50 TABLET, FILM COATED ORAL NIGHTLY
Qty: 60 TABLET | Refills: 3 | Status: SHIPPED | OUTPATIENT
Start: 2022-07-01

## 2022-07-07 DIAGNOSIS — F41.9 ANXIETY: ICD-10-CM

## 2022-07-07 DIAGNOSIS — G44.329 CHRONIC POST-TRAUMATIC HEADACHE, NOT INTRACTABLE: ICD-10-CM

## 2022-07-07 RX ORDER — BUTALBITAL, ACETAMINOPHEN AND CAFFEINE 50; 325; 40 MG/1; MG/1; MG/1
1 TABLET ORAL 2 TIMES DAILY PRN
Qty: 60 TABLET | Refills: 2 | Status: CANCELLED | OUTPATIENT
Start: 2022-07-07

## 2022-07-08 RX ORDER — DIAZEPAM 5 MG/1
5 TABLET ORAL EVERY 12 HOURS PRN
Qty: 60 TABLET | Refills: 0 | Status: SHIPPED | OUTPATIENT
Start: 2022-07-08

## 2022-08-05 DIAGNOSIS — F41.9 ANXIETY: ICD-10-CM

## 2022-08-07 DIAGNOSIS — F41.9 ANXIETY: ICD-10-CM

## 2022-08-08 RX ORDER — DIAZEPAM 5 MG/1
5 TABLET ORAL EVERY 12 HOURS PRN
Qty: 60 TABLET | Refills: 0 | Status: SHIPPED | OUTPATIENT
Start: 2022-08-08

## 2022-08-08 NOTE — TELEPHONE ENCOUNTER
diazePAM 5 MG Oral Tab  Take 1 tablet (5 mg total) by mouth every 12 (twelve) hours as needed for Anxiety.   #60, no refills    LOV: 3/16/22  NOV: None scheduled  Last refilled: 7/8/22- 30 day supply

## 2022-08-10 RX ORDER — DIAZEPAM 5 MG/1
TABLET ORAL
Qty: 60 TABLET | Refills: 0 | OUTPATIENT
Start: 2022-08-10

## 2022-08-19 RX ORDER — LOVASTATIN 40 MG/1
40 TABLET ORAL NIGHTLY
Qty: 90 TABLET | Refills: 0 | Status: SHIPPED | OUTPATIENT
Start: 2022-08-19

## 2022-08-24 DIAGNOSIS — G44.329 CHRONIC POST-TRAUMATIC HEADACHE, NOT INTRACTABLE: ICD-10-CM

## 2022-08-24 RX ORDER — AMITRIPTYLINE HYDROCHLORIDE 25 MG/1
50 TABLET, FILM COATED ORAL NIGHTLY
Qty: 60 TABLET | Refills: 3 | Status: CANCELLED | OUTPATIENT
Start: 2022-08-24

## 2022-09-01 DIAGNOSIS — G44.329 CHRONIC POST-TRAUMATIC HEADACHE, NOT INTRACTABLE: ICD-10-CM

## 2022-09-01 DIAGNOSIS — F41.9 ANXIETY: ICD-10-CM

## 2022-09-06 ENCOUNTER — OFFICE VISIT (OUTPATIENT)
Dept: NEUROLOGY | Facility: CLINIC | Age: 57
End: 2022-09-06
Payer: MEDICAID

## 2022-09-06 VITALS — DIASTOLIC BLOOD PRESSURE: 74 MMHG | HEART RATE: 103 BPM | SYSTOLIC BLOOD PRESSURE: 116 MMHG

## 2022-09-06 DIAGNOSIS — M62.838 MUSCLE SPASM: ICD-10-CM

## 2022-09-06 DIAGNOSIS — F41.9 ANXIETY: ICD-10-CM

## 2022-09-06 DIAGNOSIS — G44.329 CHRONIC POST-TRAUMATIC HEADACHE, NOT INTRACTABLE: Primary | ICD-10-CM

## 2022-09-06 PROCEDURE — 3078F DIAST BP <80 MM HG: CPT | Performed by: OTHER

## 2022-09-06 PROCEDURE — 99213 OFFICE O/P EST LOW 20 MIN: CPT | Performed by: OTHER

## 2022-09-06 PROCEDURE — 3074F SYST BP LT 130 MM HG: CPT | Performed by: OTHER

## 2022-09-06 RX ORDER — BUTALBITAL, ACETAMINOPHEN AND CAFFEINE 50; 325; 40 MG/1; MG/1; MG/1
1 TABLET ORAL 2 TIMES DAILY PRN
Qty: 60 TABLET | Refills: 2 | Status: SHIPPED | OUTPATIENT
Start: 2022-09-06 | End: 2022-09-06

## 2022-09-06 RX ORDER — BUTALBITAL, ACETAMINOPHEN AND CAFFEINE 50; 325; 40 MG/1; MG/1; MG/1
1 TABLET ORAL 2 TIMES DAILY PRN
Qty: 60 TABLET | Refills: 2 | Status: SHIPPED | OUTPATIENT
Start: 2022-09-06

## 2022-09-06 RX ORDER — DIAZEPAM 5 MG/1
TABLET ORAL
Qty: 60 TABLET | Refills: 0 | OUTPATIENT
Start: 2022-09-06

## 2022-09-06 RX ORDER — AMITRIPTYLINE HYDROCHLORIDE 75 MG/1
75 TABLET, FILM COATED ORAL NIGHTLY
Qty: 30 TABLET | Refills: 5 | Status: SHIPPED | OUTPATIENT
Start: 2022-09-06

## 2022-09-06 NOTE — TELEPHONE ENCOUNTER
Medication: Diazepam 5MG Tab     Date of last refill: 8/8/22 (#60/0)     Last office visit: 9/6/22  Due back to clinic per last office note:  3/6/23

## 2022-09-07 DIAGNOSIS — F41.9 ANXIETY: ICD-10-CM

## 2022-09-07 RX ORDER — DIAZEPAM 5 MG/1
TABLET ORAL
Qty: 60 TABLET | Refills: 0 | OUTPATIENT
Start: 2022-09-07

## 2022-09-07 NOTE — TELEPHONE ENCOUNTER
Refill request for diazepam 5 mg, take 1 tab every 12 hrs as needed, #60, no refills    LOV: 9/6/22  NOV: none  [facility]

## 2022-09-08 RX ORDER — DIAZEPAM 5 MG/1
5 TABLET ORAL EVERY 12 HOURS PRN
Qty: 60 TABLET | Refills: 0 | OUTPATIENT
Start: 2022-09-08

## 2022-09-08 RX ORDER — DIAZEPAM 5 MG/1
5 TABLET ORAL EVERY 12 HOURS PRN
Qty: 60 TABLET | Refills: 0 | Status: SHIPPED | OUTPATIENT
Start: 2022-09-08

## 2022-09-08 NOTE — TELEPHONE ENCOUNTER
Patient calling again to check on status of Diazepam RX. Patient verbalized that she will come in to [facility] clinic today to check status. RN advised that patient would NOT be able to see provider today in [facility] as schedule is full. Routed to provider for review and signature of pended RX. Pt requesting call when filled. Routed to [facility] RN pool to inform.

## 2022-09-08 NOTE — TELEPHONE ENCOUNTER
I spoke with the patient and informed her that Dr. Benjamin Miller has refilled the prescription. Patient verbalized understanding. Reviewed and electronically signed by:  [facility], [address] [place] [address], [facility]

## 2022-09-22 RX ORDER — BUTALBITAL, ACETAMINOPHEN AND CAFFEINE 50; 325; 40 MG/1; MG/1; MG/1
1 TABLET ORAL 2 TIMES DAILY PRN
Qty: 60 TABLET | OUTPATIENT
Start: 2022-09-22

## 2022-09-22 RX ORDER — DIAZEPAM 5 MG/1
5 TABLET ORAL EVERY 12 HOURS PRN
Qty: 60 TABLET | OUTPATIENT
Start: 2022-09-22

## 2022-09-27 RX ORDER — LISINOPRIL 10 MG/1
10 TABLET ORAL 2 TIMES DAILY
Qty: 180 TABLET | Refills: 1 | Status: SHIPPED | OUTPATIENT
Start: 2022-09-27

## 2022-09-27 NOTE — TELEPHONE ENCOUNTER
Please review. Protocol failed/ No protocol      Requested Prescriptions   Pending Prescriptions Disp Refills    mirtazapine 15 MG Oral Tab 90 tablet 0     Sig: Take 1 tablet (15 mg total) by mouth nightly. There is no refill protocol information for this order       Signed Prescriptions Disp Refills    lisinopril 10 MG Oral Tab 180 tablet 1     Sig: Take 1 tablet (10 mg total) by mouth 2 (two) times daily.         Hypertensive Medications Protocol Passed - 9/26/2022  1:31 PM        Passed - In person appointment in the past 12 or next 3 months       Recent Outpatient Visits              3 weeks ago Chronic post-traumatic headache, not intractable    RADHA Hoover MD    Office Visit    3 months ago Abnormal mammogram of right breast    [facility] Alison Szymanski MD    Office Visit    3 months ago Preop general physical exam    [address], Collette Leyland, MD    Office Visit    4 months ago [facility] drug [facility]    Office Visit    4 months ago Atypical ductal hyperplasia of right breast    [facility] Alison Szymanski MD    Office Visit     Future Appointments         Provider Department Appt Notes    In 1 month MD [facility] getting mammo prior               Passed - Last BP reading less than 140/90     BP Readings from Last 1 Encounters:  09/06/22 : 116/74                Passed - CMP or BMP in past 6 months     Recent Results (from the past 4392 hour(s))   COMP METABOLIC PANEL (14)    Collection Time: 06/11/22 11:33 AM   Result Value Ref Range    Glucose 101 (H) 70 - 99 mg/dL    Sodium 139 136 - 145 mmol/L    Potassium 4.2 3.5 - 5.1 mmol/L    Chloride 110 98 - 112 mmol/L    CO2 20.0 (L) 21.0 - 32.0 mmol/L    Anion Gap 9 0 - 18 mmol/L    BUN 13 7 - 18 mg/dL Creatinine 1.00 0.55 - 1.02 mg/dL    BUN/CREA Ratio 13.0 10.0 - 20.0    Calcium, Total 9.2 8.5 - 10.1 mg/dL    Calculated Osmolality 288 275 - 295 mOsm/kg    GFR, Non- 64 >=60    GFR, -American 74 >=60    ALT 18 13 - 56 U/L    AST 16 15 - 37 U/L    Alkaline Phosphatase 110 (H) 41 - 108 U/L    Bilirubin, Total 0.2 0.1 - 2.0 mg/dL    Total Protein 7.7 6.4 - 8.2 g/dL    Albumin 3.3 (L) 3.4 - 5.0 g/dL    Globulin  4.4 2.8 - 4.4 g/dL    A/G Ratio 0.8 (L) 1.0 - 2.0    Patient Fasting for CMP? Yes      *Note: Due to a large number of results and/or encounters for the requested time period, some results have not been displayed. A complete set of results can be found in Results Review.                  Passed - In person appointment or virtual visit in the past 6 months       Recent Outpatient Visits              3 weeks ago Chronic post-traumatic headache, not intractable    [facility] Airam Galvan MD    Office Visit    3 months ago Abnormal mammogram of right breast    [facility] Parrish Riggs MD    Office Visit    3 months ago Preop general physical exam    Neil Martin MD    Office Visit    4 months ago Therapeutic drug monitoring    Giselle Hleler, [facility]    Office Visit    4 months ago Atypical ductal hyperplasia of right breast    [facility] Parrish Riggs MD    Office Visit     Future Appointments         Provider Department Appt Notes    In 1 month MD [facility] getting mammo prior               Passed [facility] or GFRAA > 50     GFR Evaluation  GFRAA: 74 , resulted on 6/11/2022                  Future Appointments         Provider Department Appt Notes    In 1 month MD [facility] getting mammo prior             Recent Outpatient Visits              3 weeks ago Chronic post-traumatic headache, not intractable    [facility] Matthew Casas MD    Office Visit    3 months ago Abnormal mammogram of right breast    [facility] Rosario Ardon MD    Office Visit    3 months ago Preop general physical exam    [facility], [facility], [address], Nahomy Tsang MD    Office Visit    4 months ago Therapeutic drug monitoring    [address], [facility]    Office Visit    4 months ago Atypical ductal hyperplasia of right breast    [facility] Rosario Ardon MD    Office Visit

## 2022-09-27 NOTE — TELEPHONE ENCOUNTER
Refill passed per [facility] protocol. Requested Prescriptions   Pending Prescriptions Disp Refills    lisinopril 10 MG Oral Tab 180 tablet 1     Sig: Take 1 tablet (10 mg total) by mouth 2 (two) times daily.         Hypertensive Medications Protocol Passed - 9/26/2022  1:31 PM        Passed - In person appointment in the past 12 or next 3 months       Recent Outpatient Visits              3 weeks ago Chronic post-traumatic headache, not intractable    RADHA Powers MD    Office Visit    3 months ago Abnormal mammogram of right breast    [address] [facility] Joshua Bragg MD    Office Visit    3 months ago Preop general physical exam    [address] Elizabeth MD    Office Visit    4 months ago Therapeutic drug monitoring    [facility], [facility] Harpreet [facility], KANIKA Rae    Office Visit    4 months ago Atypical ductal hyperplasia of right breast    [address] [facility]    Office Visit     Future Appointments         Provider Department Appt Notes    In 1 month MD [address] [facility] getting mammo prior               Passed - Last BP reading less than 140/90     BP Readings from Last 1 Encounters:  09/06/22 : 116/74                Passed - CMP or BMP in past 6 months     Recent Results (from the past 4392 hour(s))   COMP METABOLIC PANEL (14)    Collection Time: 06/11/22 11:33 AM   Result Value Ref Range    Glucose 101 (H) 70 - 99 mg/dL    Sodium 139 136 - 145 mmol/L    Potassium 4.2 3.5 - 5.1 mmol/L    Chloride 110 98 - 112 mmol/L    CO2 20.0 (L) 21.0 - 32.0 mmol/L    Anion Gap 9 0 - 18 mmol/L    BUN 13 7 - 18 mg/dL    Creatinine 1.00 0.55 - 1.02 mg/dL    BUN/CREA Ratio 13.0 10.0 - 20.0    Calcium, Total 9.2 8.5 - 10.1 mg/dL    Calculated Osmolality 288 275 - 295 mOsm/kg    GFR, Non- 64 >=60    GFR, -American 74 >=60    ALT 18 13 - 56 U/L    AST 16 15 - 37 U/L    Alkaline Phosphatase 110 (H) 41 - 108 U/L    Bilirubin, Total 0.2 0.1 - 2.0 mg/dL    Total Protein 7.7 6.4 - 8.2 g/dL    Albumin 3.3 (L) 3.4 - 5.0 g/dL    Globulin  4.4 2.8 - 4.4 g/dL    A/G Ratio 0.8 (L) 1.0 - 2.0    Patient Fasting for CMP? Yes      *Note: Due to a large number of results and/or encounters for the requested time period, some results have not been displayed. A complete set of results can be found in Results Review. Passed - In person appointment or virtual visit in the past 6 months       Recent Outpatient Visits              3 weeks ago Chronic post-traumatic headache, not intractable    [facility] Andressa Tineo MD    Office Visit    3 months ago Abnormal mammogram of right breast    [facility] Shelby Shaw MD    Office Visit    3 months ago Preop general physical exam    [address] Elizabeth MD    Office Visit    4 months ago Therapeutic drug monitoring    [address], [facility], [place], [place], APRN    Office Visit    4 months ago Atypical ductal hyperplasia of right breast    [facility] Shelby Shaw MD    Office Visit     Future Appointments         Provider Department Appt Notes    In 1 month MD [facility] getting mammo prior               Passed [facility] or GFRAA > 50     GFR Evaluation  GFRAA: 74 , resulted on 6/11/2022               mirtazapine 15 MG Oral Tab 90 tablet 0     Sig: Take 1 tablet (15 mg total) by mouth nightly.         There is no refill protocol information for this order            Future Appointments         Provider Department Appt Notes    In 1 month MD [facility] getting mammo prior            Recent Outpatient Visits              3 weeks ago Chronic post-traumatic headache, not intractable    [facility] Nora Ogden MD    Office Visit    3 months ago Abnormal mammogram of right breast    [facility] Jcarlos Larson MD    Office Visit    3 months ago Preop general physical exam    [facility], [facility], [address], Cele Grullon MD    Office Visit    4 months ago Therapeutic drug monitoring    [facility], [facility], [facility]    Office Visit    4 months ago Atypical ductal hyperplasia of right breast    [facility] Jcarlos Larson MD    Office Visit

## 2022-09-29 RX ORDER — MIRTAZAPINE 15 MG/1
15 TABLET, FILM COATED ORAL NIGHTLY
Qty: 90 TABLET | Refills: 0 | Status: SHIPPED | OUTPATIENT
Start: 2022-09-29

## 2022-10-01 DIAGNOSIS — F41.9 ANXIETY: ICD-10-CM

## 2022-10-03 RX ORDER — DIAZEPAM 5 MG/1
5 TABLET ORAL EVERY 12 HOURS PRN
Qty: 60 TABLET | Refills: 0 | Status: SHIPPED | OUTPATIENT
Start: 2022-10-03

## 2022-10-10 RX ORDER — MIRTAZAPINE 15 MG/1
TABLET, FILM COATED ORAL
Qty: 90 TABLET | Refills: 0 | Status: SHIPPED | OUTPATIENT
Start: 2022-10-10

## 2022-10-10 NOTE — TELEPHONE ENCOUNTER
Please review. Protocol failed / No protocol.    Requested Prescriptions   Pending Prescriptions Disp Refills    MIRTAZAPINE 15 MG Oral Tab [Pharmacy Med Name: Mirtazapine 15 MG Oral Tablet] 90 tablet 0     Sig: Take 1 tablet by mouth nightly        There is no refill protocol information for this order          Recent Outpatient Visits              1 month ago Chronic post-traumatic headache, not intractable    [address] [facility] Jennifer Shay MD    Office Visit    3 months ago Abnormal mammogram of right breast    [facility] Hayder Tirado MD    Office Visit    4 months ago Preop general physical exam    [facility], [place], [address], Caridad Horton MD    Office Visit    4 months ago Therapeutic drug monitoring    [facility]    Office Visit    5 months ago Atypical ductal hyperplasia of right breast    [facility] Hayder Tirado MD    Office Visit           Future Appointments         Provider Department Appt Notes    In 1 month Hayder Tirado MD [facility] getting mammo prior

## 2022-10-26 RX ORDER — GABAPENTIN 300 MG/1
300 CAPSULE ORAL 4 TIMES DAILY
Qty: 360 CAPSULE | Refills: 1 | Status: SHIPPED | OUTPATIENT
Start: 2022-10-26

## 2022-10-26 NOTE — TELEPHONE ENCOUNTER
Refill passed per [employer], [employer] protocol. .  Requested Prescriptions   Pending Prescriptions Disp Refills    gabapentin 300 MG Oral Cap 360 capsule 1     Sig: Take 1 capsule (300 mg total) by mouth 4 (four) times daily.        Neurology Medications Passed - 10/25/2022 11:49 AM        Passed - In person appointment or virtual visit in the past 6 mos or appointment in next 3 mos     Recent Outpatient Visits              1 month ago Chronic post-traumatic headache, not intractable    [facility] [address] [place] MD    Office Visit    4 months ago Abnormal mammogram of right breast    Nima Baez MD    Office Visit    4 months ago Preop general physical exam    [address] Elizabeth MD    Office Visit    5 months ago Therapeutic drug monitoring    Giselle Heller, [facility], [facility]    Office Visit    5 months ago Atypical ductal hyperplasia of right breast    [facility] Deb Baez MD    Office Visit          Future Appointments         Provider Department Appt Notes    In 2 weeks Deb Baez MD [facility] getting mammo prior                    Recent Outpatient Visits              1 month ago Chronic post-traumatic headache, not intractable    [facility] [address] [place] MD    Office Visit    4 months ago Abnormal mammogram of right breast    Nima Baez MD    Office Visit    4 months ago Preop general physical exam    [employer], [facility], [address], Benoit Ny MD    Office Visit    5 months ago Therapeutic drug monitoring    Giselle Heller, [facility], [facility] dowell, KANIKA    Office Visit    5 months ago Atypical ductal hyperplasia of right breast    [facility] Esteban Santoro MD    Office Visit            Future Appointments         Provider Department Appt Notes    In 2 weeks Heather Garcia MD THE [facility] OF [facility] getting mammo prior

## 2022-10-26 NOTE — TELEPHONE ENCOUNTER
Refill passed per [employer], [employer] protocol. .  Requested Prescriptions   Pending Prescriptions Disp Refills    gabapentin 300 MG Oral Cap 360 capsule 1     Sig: Take 1 capsule (300 mg total) by mouth 4 (four) times daily.        Neurology Medications Passed - 10/25/2022 11:49 AM        Passed - In person appointment or virtual visit in the past 6 mos or appointment in next 3 mos     Recent Outpatient Visits              1 month ago Chronic post-traumatic headache, not [facility] Heladio Morgan MD    Office Visit    4 months ago Abnormal mammogram of right breast    Nima Wang MD    Office Visit    4 months ago Preop general physical exam    [address], Ayesha Dominguez MD    Office Visit    5 months ago Therapeutic drug monitoring    Giselle Heller, [facility] [place] Chelsy Odonnell APRN    Office Visit    5 months ago Atypical ductal hyperplasia of right breast    [facility] Willis Wang MD    Office Visit          Future Appointments         Provider Department Appt Notes    In 2 weeks Willis Wang MD [facility] getting mammo prior                    Recent Outpatient Visits              1 month ago Chronic post-traumatic headache, not [facility] Heladio Morgan MD    Office Visit    4 months ago Abnormal mammogram of right breast    Nima Wang MD    Office Visit    4 months ago Preop general physical exam    [employer], [facility], [address], Ayesha Dominguez MD    Office Visit    5 months ago Therapeutic drug monitoring    Giselle Heller, [facility], [place] Chelsy Odonnell APRN    Office Visit    5 months ago Atypical ductal hyperplasia of right breast    [facility] Graciela Kraft MD    Office Visit            Future Appointments         Provider Department Appt Notes    In 2 weeks Sophie Yadav MD THE [facility] OF [facility] getting mammo prior

## 2022-11-01 DIAGNOSIS — F41.9 ANXIETY: ICD-10-CM

## 2022-11-01 NOTE — TELEPHONE ENCOUNTER
diazePAM 5 MG Oral Tab  Take 1 tablet (5 mg total) by mouth every 12 (twelve) hours as needed for Anxiety  #60, no refills    LOV: 9/6/2022  NOV: None scheduled  [facility]

## 2022-11-01 NOTE — TELEPHONE ENCOUNTER
Patient is calling for a refill, she is at another location and ran out of medicine.  Please see pended

## 2022-11-01 NOTE — TELEPHONE ENCOUNTER
Please review. Protocol failed / No protocol. Requested Prescriptions   Pending Prescriptions Disp Refills    Lovastatin 40 MG Oral Tab 90 tablet 0     Sig: Take 1 tablet (40 mg total) by mouth nightly.        Cholesterol Medication Protocol Failed - 11/1/2022 10:02 AM        Failed - Last LDL < 130     Lab Results   Component Value Date     (H) 06/11/2022             Passed - ALT in past 12 months        Passed - LDL in past 12 months        Passed - Last ALT < 80     Lab Results   Component Value Date    ALT 18 06/11/2022             Passed - In person appointment or virtual visit in the past 12 mos or appointment in next 3 mos     Recent Outpatient Visits              1 month ago Chronic post-traumatic headache, not intractable    RADHA Shay MD    Office Visit    4 months ago Abnormal mammogram of right breast    [facility] Hayder Tirado MD    Office Visit    4 months ago Preop general physical exam    [address] Caridad Castañeda MD    Office Visit    5 months ago Therapeutic drug monitoring    YAMILET Parker [facility], [facility]    Office Visit    5 months ago Atypical ductal hyperplasia of right breast    THE [facility] Hayder Tirado MD    Office Visit          Future Appointments         Provider Department Appt Notes    In 2 weeks Hayder Tirado MD THE [facility] getting mammo prior                    Future Appointments         Provider Department Appt Notes    In 2 weeks Hayder Tirado MD THE [facility] getting mammo prior            Recent Outpatient Visits              1 month ago Chronic post-traumatic headache, not intractable    RADHA Shay MD    Office Visit    4 months ago Abnormal mammogram of right breast    THE [facility] Sheryle Hick, MD    Office Visit    4 months ago Preop general physical exam    [facility], [facility], [address], Gricel Goode MD    Office Visit    5 months ago Therapeutic drug monitoring    Giselle Heller, [facility], [place], [place], APRN    Office Visit    5 months ago Atypical ductal hyperplasia of right breast    THE [facility] OF [facility] Sheryle Hick, MD    Office Visit

## 2022-11-02 NOTE — TELEPHONE ENCOUNTER
Patient requesting a refill of amLODIPine 10 MG Oral Tab  Please send to   [address] [place] [address], [place]

## 2022-11-03 RX ORDER — AMLODIPINE BESYLATE 10 MG/1
10 TABLET ORAL NIGHTLY
Qty: 90 TABLET | Refills: 1 | Status: SHIPPED | OUTPATIENT
Start: 2022-11-03

## 2022-11-03 RX ORDER — LOVASTATIN 40 MG/1
40 TABLET ORAL NIGHTLY
Qty: 90 TABLET | Refills: 0 | Status: SHIPPED | OUTPATIENT
Start: 2022-11-03

## 2022-11-03 NOTE — TELEPHONE ENCOUNTER
Refill passed per [employer] protocol. Requested Prescriptions   Pending Prescriptions Disp Refills    amLODIPine 10 MG Oral Tab 90 tablet 1     Sig: Take 1 tablet (10 mg total) by mouth nightly.        Hypertensive Medications Protocol Passed - 11/2/2022  3:38 PM        Passed - In person appointment in the past 12 or next 3 months     Recent Outpatient Visits              1 month ago Chronic post-traumatic headache, not [facility]    MEDICAL [facility] Hira Stockton MD    Office Visit    4 months ago Abnormal mammogram of right breast    [address] [facility] Kyra Lorenzo MD    Office Visit    4 months ago Preop general physical exam    [employer], [address], Dennis Awan MD    Office Visit    5 months ago Therapeutic drug monitoring    D.[facility]ot Parker, [facility], [facility], [facility], Tillar Helen, APRN    Office Visit    5 months ago Atypical ductal hyperplasia of right breast    THE [facility] Kyra Lorenzo MD    Office Visit                      Passed - Last BP reading less than 140/90     BP Readings from Last 1 Encounters:  09/06/22 : 116/74              Passed - CMP or BMP in past 6 months     Recent Results (from the past 4392 hour(s))   COMP METABOLIC PANEL (14)    Collection Time: 06/11/22 11:33 AM   Result Value Ref Range    Glucose 101 (H) 70 - 99 mg/dL    Sodium 139 136 - 145 mmol/L    Potassium 4.2 3.5 - 5.1 mmol/L    Chloride 110 98 - 112 mmol/L    CO2 20.0 (L) 21.0 - 32.0 mmol/L    Anion Gap 9 0 - 18 mmol/L    BUN 13 7 - 18 mg/dL    Creatinine 1.00 0.55 - 1.02 mg/dL    BUN/CREA Ratio 13.0 10.0 - 20.0    Calcium, Total 9.2 8.5 - 10.1 mg/dL    Calculated Osmolality 288 275 - 295 mOsm/kg    GFR, Non- 64 >=60    GFR, -American 74 >=60    ALT 18 13 - 56 U/L    AST 16 15 - 37 U/L    Alkaline Phosphatase 110 (H) 41 - 108 U/L    Bilirubin, Total 0.2 0.1 - 2.0 mg/dL    Total Protein 7.7 6.4 - 8.2 g/dL    Albumin 3.3 (L) 3.4 - 5.0 g/dL    Globulin  4.4 2.8 - 4.4 g/dL    A/G Ratio 0.8 (L) 1.0 - 2.0    Patient Fasting for CMP? Yes      *Note: Due to a large number of results and/or encounters for the requested time period, some results have not been displayed. A complete set of results can be found in Results Review.                Passed - In person appointment or virtual visit in the past 6 months     Recent Outpatient Visits              1 month ago Chronic post-traumatic headache, not intractable    [facility] Ivet Hackett MD    Office Visit    4 months ago Abnormal mammogram of right breast    [facility] Gala Francois MD    Office Visit    4 months ago Preop general physical exam    [address] [place] MD    Office Visit    5 months ago Therapeutic drug monitoring    YAMILET Parker [facility], [facility], [place], [place], APRN    Office Visit    5 months ago Atypical ductal hyperplasia of right breast    [facility] Gala Francois MD    Office Visit                      Passed - EGFRCR or GFRAA > 50     GFR Evaluation  GFRAA: 74 , resulted on 6/11/2022             Recent Outpatient Visits              1 month ago Chronic post-traumatic headache, not intractable    [facility] Ivet Hackett MD    Office Visit    4 months ago Abnormal mammogram of right breast    [address] [place] [address] Gala Francois MD    Office Visit    4 months ago Preop general physical exam    [facility], [facility], [address], [place] Dennis MD    Office Visit    5 months ago Therapeutic drug monitoring    YAMILET [facility] [facility], [facility]    Office Visit    5 months ago Atypical ductal hyperplasia of right breast    [facility] Gala Francois MD Office Visit

## 2022-11-04 RX ORDER — DIAZEPAM 5 MG/1
5 TABLET ORAL EVERY 12 HOURS PRN
Qty: 60 TABLET | Refills: 0 | Status: SHIPPED | OUTPATIENT
Start: 2022-11-04 | End: 2022-11-07

## 2022-11-04 NOTE — TELEPHONE ENCOUNTER
Spoke to patient who states she is upset that she requested a refill of this medication on Tuesday. I let her know that Dr. Alex De La Cruz is on call and working in the ER, so I can send her a message HP. Pt was understanding and thanked me for call.

## 2022-11-05 ENCOUNTER — TELEPHONE (OUTPATIENT)
Dept: NEUROLOGY | Facility: CLINIC | Age: 57
End: 2022-11-05

## 2022-11-07 ENCOUNTER — TELEPHONE (OUTPATIENT)
Dept: NEUROLOGY | Facility: CLINIC | Age: 57
End: 2022-11-07

## 2022-11-07 DIAGNOSIS — F41.9 ANXIETY: ICD-10-CM

## 2022-11-07 RX ORDER — DIAZEPAM 5 MG/1
TABLET ORAL
Qty: 60 TABLET | Refills: 0 | OUTPATIENT
Start: 2022-11-07

## 2022-11-07 RX ORDER — DIAZEPAM 5 MG/1
5 TABLET ORAL EVERY 12 HOURS PRN
Qty: 60 TABLET | Refills: 0 | Status: SHIPPED | OUTPATIENT
Start: 2022-11-07

## 2022-11-07 NOTE — TELEPHONE ENCOUNTER
Pt called stating she needs this medication sent to [facility]. [address], [address] [facility]. [phone], [phone]

## 2022-11-07 NOTE — TELEPHONE ENCOUNTER
Noted.  Rx has been pendedfor the [place] location for Dr. Virgilio Leiva to review and sign if agreeable. I spoke with the [address] [place], [place] [address] [facility], and the prescription has not been picked up. Reviewed and electronically signed by:  500 [facility], [address] [place] [address], [facility]

## 2022-11-08 NOTE — TELEPHONE ENCOUNTER
Patient paged, that her diazepam prescription was sent to the wrong pharmacy.  I resent it to correct pharmacy    Please make sure that other prescription was cancelled

## 2022-12-11 DIAGNOSIS — G44.329 CHRONIC POST-TRAUMATIC HEADACHE, NOT INTRACTABLE: ICD-10-CM

## 2022-12-12 NOTE — TELEPHONE ENCOUNTER
Refill request for fioricet -40 mg, BID PRN, #60, 2 refills    LOV: 9/6/22  NOV: None  [place]

## 2022-12-13 RX ORDER — BUTALBITAL, ACETAMINOPHEN AND CAFFEINE 50; 325; 40 MG/1; MG/1; MG/1
TABLET ORAL
Qty: 60 TABLET | Refills: 2 | Status: SHIPPED | OUTPATIENT
Start: 2022-12-13

## 2022-12-29 RX ORDER — MIRTAZAPINE 15 MG/1
15 TABLET, FILM COATED ORAL NIGHTLY
Qty: 90 TABLET | Refills: 0 | Status: SHIPPED | OUTPATIENT
Start: 2022-12-29

## 2023-01-05 DIAGNOSIS — F41.9 ANXIETY: ICD-10-CM

## 2023-01-05 RX ORDER — DIAZEPAM 5 MG/1
5 TABLET ORAL EVERY 12 HOURS PRN
Qty: 60 TABLET | Refills: 0 | Status: SHIPPED | OUTPATIENT
Start: 2023-01-05

## 2023-01-11 ENCOUNTER — OFFICE VISIT (OUTPATIENT)
Dept: INTERNAL MEDICINE CLINIC | Facility: CLINIC | Age: 58
End: 2023-01-11

## 2023-01-11 VITALS
WEIGHT: 224.5 LBS | BODY MASS INDEX: 38.33 KG/M2 | HEART RATE: 99 BPM | DIASTOLIC BLOOD PRESSURE: 78 MMHG | SYSTOLIC BLOOD PRESSURE: 122 MMHG | OXYGEN SATURATION: 96 % | HEIGHT: 64 IN | TEMPERATURE: 98 F

## 2023-01-11 DIAGNOSIS — I10 PRIMARY HYPERTENSION: Primary | ICD-10-CM

## 2023-01-11 DIAGNOSIS — E55.9 VITAMIN D DEFICIENCY: ICD-10-CM

## 2023-01-11 DIAGNOSIS — F32.A ANXIETY AND DEPRESSION: ICD-10-CM

## 2023-01-11 DIAGNOSIS — N18.31 STAGE 3A CHRONIC KIDNEY DISEASE (HCC): ICD-10-CM

## 2023-01-11 DIAGNOSIS — R56.9 SEIZURE (HCC): ICD-10-CM

## 2023-01-11 DIAGNOSIS — F41.9 ANXIETY AND DEPRESSION: ICD-10-CM

## 2023-01-11 DIAGNOSIS — N60.91 ATYPICAL DUCTAL HYPERPLASIA OF RIGHT BREAST: ICD-10-CM

## 2023-01-11 DIAGNOSIS — E78.2 MIXED HYPERLIPIDEMIA: ICD-10-CM

## 2023-01-11 PROCEDURE — 3078F DIAST BP <80 MM HG: CPT | Performed by: INTERNAL MEDICINE

## 2023-01-11 PROCEDURE — 99214 OFFICE O/P EST MOD 30 MIN: CPT | Performed by: INTERNAL MEDICINE

## 2023-01-11 PROCEDURE — 3074F SYST BP LT 130 MM HG: CPT | Performed by: INTERNAL MEDICINE

## 2023-01-11 PROCEDURE — 3008F BODY MASS INDEX DOCD: CPT | Performed by: INTERNAL MEDICINE

## 2023-01-13 ENCOUNTER — TELEPHONE (OUTPATIENT)
Dept: NEUROLOGY | Facility: CLINIC | Age: 58
End: 2023-01-13

## 2023-01-13 DIAGNOSIS — E34.8 PINEAL GLAND CYST: Primary | ICD-10-CM
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
## 2023-01-24 NOTE — TELEPHONE ENCOUNTER
I spoke with the patient and informed her the MRI has been ordered. Informed her to go to the ED if her sx's worsen. Patient verbalized understanding. Reviewed and electronically signed by:  [address], [facility]

## 2023-01-26 ENCOUNTER — TELEPHONE (OUTPATIENT)
Dept: CASE MANAGEMENT | Age: 58
End: 2023-01-26

## 2023-01-26 DIAGNOSIS — G44.329 CHRONIC POST-TRAUMATIC HEADACHE, NOT INTRACTABLE: Primary | ICD-10-CM

## 2023-01-26 DIAGNOSIS — Z86.39 HISTORY OF PINEAL CYST: ICD-10-CM

## 2023-01-26 NOTE — TELEPHONE ENCOUNTER
Dori Malagon to do [id number] without contrast this is approved under case #[id number] spoke with Dorie jo at [facility] to change to  at [facility] from 1/26/23-7/25/23    Called patient had to leave a message with above information

## 2023-01-26 NOTE — TELEPHONE ENCOUNTER
Spoke with Dr Simone Martínez who states patient can have MRI Brain w/o contrast.  Order placed for MRI Brain w/o contrast.

## 2023-01-26 NOTE — TELEPHONE ENCOUNTER
Checked with insurance and they said that they would approve an MRI Brain w/o contrast [id number]. Please see if the provider would like to change to [phone] (if so a new order is needed) if not the [phone] with and without contrast probably will be denied. Please advise. Or Provider can schedule a Peer to Peer to indicate why she wants with and without    [phone] #4 to schedule the peer to peer. This has to be completed by 2/1/2023.       Case #[id number]    Disregard below message from Janie Blancas

## 2023-01-26 NOTE — TELEPHONE ENCOUNTER
The health plan has requested additional information for [phone] by 01/29/2023. You may contact [address] Cristofer Duran at [phone], ref# [id number].      Thank you,  Clovis Tinsley

## 2023-01-28 ENCOUNTER — HOSPITAL ENCOUNTER (EMERGENCY)
Facility: HOSPITAL | Age: 58
Discharge: HOME | End: 2023-01-28
Attending: EMERGENCY MEDICINE
Payer: MEDICAID

## 2023-01-28 ENCOUNTER — APPOINTMENT (OUTPATIENT)
Dept: MRI IMAGING | Facility: HOSPITAL | Age: 58
End: 2023-01-28
Attending: EMERGENCY MEDICINE
Payer: MEDICAID

## 2023-01-28 VITALS
SYSTOLIC BLOOD PRESSURE: 114 MMHG | DIASTOLIC BLOOD PRESSURE: 81 MMHG | RESPIRATION RATE: 22 BRPM | TEMPERATURE: 98 F | OXYGEN SATURATION: 96 % | HEART RATE: 87 BPM

## 2023-01-28 DIAGNOSIS — R51.9 SINUS HEADACHE: Primary | ICD-10-CM

## 2023-01-28 DIAGNOSIS — J01.20 ACUTE ETHMOIDAL SINUSITIS, RECURRENCE NOT SPECIFIED: ICD-10-CM

## 2023-01-28 LAB
ALBUMIN SERPL-MCNC: 3 G/DL (ref 3.4–5)
ALBUMIN/GLOB SERPL: 0.7 {RATIO} (ref 1–2)
ALP LIVER SERPL-CCNC: 141 U/L
ALT SERPL-CCNC: 26 U/L
ANION GAP SERPL CALC-SCNC: 4 MMOL/L (ref 0–18)
AST SERPL-CCNC: 24 U/L (ref 15–37)
BASOPHILS # BLD AUTO: 0.05 X10(3) UL (ref 0–0.2)
BASOPHILS NFR BLD AUTO: 0.5 %
BILIRUB SERPL-MCNC: 0.1 MG/DL (ref 0.1–2)
BUN BLD-MCNC: 19 MG/DL (ref 7–18)
CALCIUM BLD-MCNC: 9.1 MG/DL (ref 8.5–10.1)
CHLORIDE SERPL-SCNC: 114 MMOL/L (ref 98–112)
CO2 SERPL-SCNC: 25 MMOL/L (ref 21–32)
CREAT BLD-MCNC: 1.56 MG/DL
EOSINOPHIL # BLD AUTO: 0.16 X10(3) UL (ref 0–0.7)
EOSINOPHIL NFR BLD AUTO: 1.5 %
ERYTHROCYTE [DISTWIDTH] IN BLOOD BY AUTOMATED COUNT: 14 %
FLUAV + FLUBV RNA SPEC NAA+PROBE: NEGATIVE
FLUAV + FLUBV RNA SPEC NAA+PROBE: NEGATIVE
GFR SERPLBLD BASED ON 1.73 SQ M-ARVRAT: 39 ML/MIN/1.73M2 (ref 60–?)
GLOBULIN PLAS-MCNC: 4.3 G/DL (ref 2.8–4.4)
GLUCOSE BLD-MCNC: 103 MG/DL (ref 70–99)
HCT VFR BLD AUTO: 42 %
HGB BLD-MCNC: 13.6 G/DL
IMM GRANULOCYTES # BLD AUTO: 0.06 X10(3) UL (ref 0–1)
IMM GRANULOCYTES NFR BLD: 0.6 %
LYMPHOCYTES # BLD AUTO: 4.22 X10(3) UL (ref 1–4)
LYMPHOCYTES NFR BLD AUTO: 39.6 %
MCH RBC QN AUTO: 28.9 PG (ref 26–34)
MCHC RBC AUTO-ENTMCNC: 32.4 G/DL (ref 31–37)
MCV RBC AUTO: 89.4 FL
MONOCYTES # BLD AUTO: 0.48 X10(3) UL (ref 0.1–1)
MONOCYTES NFR BLD AUTO: 4.5 %
NEUTROPHILS # BLD AUTO: 5.68 X10 (3) UL (ref 1.5–7.7)
NEUTROPHILS # BLD AUTO: 5.68 X10(3) UL (ref 1.5–7.7)
NEUTROPHILS NFR BLD AUTO: 53.3 %
OSMOLALITY SERPL CALC.SUM OF ELEC: 299 MOSM/KG (ref 275–295)
PLATELET # BLD AUTO: 305 10(3)UL (ref 150–450)
POTASSIUM SERPL-SCNC: 4.4 MMOL/L (ref 3.5–5.1)
PROT SERPL-MCNC: 7.3 G/DL (ref 6.4–8.2)
RBC # BLD AUTO: 4.7 X10(6)UL
RSV RNA SPEC NAA+PROBE: NEGATIVE
SARS-COV-2 RNA RESP QL NAA+PROBE: NOT DETECTED
SODIUM SERPL-SCNC: 143 MMOL/L (ref 136–145)
WBC # BLD AUTO: 10.7 X10(3) UL (ref 4–11)

## 2023-01-28 PROCEDURE — 0241U SARS-COV-2/FLU A AND B/RSV BY PCR (GENEXPERT): CPT | Performed by: EMERGENCY MEDICINE

## 2023-01-28 PROCEDURE — 99285 EMERGENCY DEPT VISIT HI MDM: CPT

## 2023-01-28 PROCEDURE — 96374 THER/PROPH/DIAG INJ IV PUSH: CPT

## 2023-01-28 PROCEDURE — 80053 COMPREHEN METABOLIC PANEL: CPT | Performed by: EMERGENCY MEDICINE

## 2023-01-28 PROCEDURE — 70551 MRI BRAIN STEM W/O DYE: CPT | Performed by: EMERGENCY MEDICINE

## 2023-01-28 PROCEDURE — 85025 COMPLETE CBC W/AUTO DIFF WBC: CPT | Performed by: EMERGENCY MEDICINE

## 2023-01-28 PROCEDURE — 99284 EMERGENCY DEPT VISIT MOD MDM: CPT

## 2023-01-28 PROCEDURE — 96361 HYDRATE IV INFUSION ADD-ON: CPT

## 2023-01-28 PROCEDURE — 96375 TX/PRO/DX INJ NEW DRUG ADDON: CPT

## 2023-01-28 RX ORDER — HYDROMORPHONE HYDROCHLORIDE 1 MG/ML
0.5 INJECTION, SOLUTION INTRAMUSCULAR; INTRAVENOUS; SUBCUTANEOUS EVERY 30 MIN PRN
Status: DISCONTINUED | OUTPATIENT
Start: 2023-01-28 | End: 2023-01-28

## 2023-01-28 RX ORDER — DIPHENHYDRAMINE HYDROCHLORIDE 50 MG/ML
25 INJECTION INTRAMUSCULAR; INTRAVENOUS ONCE
Status: COMPLETED | OUTPATIENT
Start: 2023-01-28 | End: 2023-01-28

## 2023-01-28 RX ORDER — DOXYCYCLINE HYCLATE 100 MG/1
100 CAPSULE ORAL 2 TIMES DAILY
Qty: 20 CAPSULE | Refills: 0 | Status: SHIPPED | OUTPATIENT
Start: 2023-01-28 | End: 2023-02-07

## 2023-01-28 RX ORDER — DEXAMETHASONE SODIUM PHOSPHATE 10 MG/ML
10 INJECTION, SOLUTION INTRAMUSCULAR; INTRAVENOUS ONCE
Status: COMPLETED | OUTPATIENT
Start: 2023-01-28 | End: 2023-01-28

## 2023-01-28 RX ORDER — KETOROLAC TROMETHAMINE 15 MG/ML
15 INJECTION, SOLUTION INTRAMUSCULAR; INTRAVENOUS ONCE
Status: COMPLETED | OUTPATIENT
Start: 2023-01-28 | End: 2023-01-28

## 2023-01-28 RX ORDER — DOXYCYCLINE HYCLATE 100 MG/1
100 CAPSULE ORAL 2 TIMES DAILY
Qty: 20 CAPSULE | Refills: 0 | Status: SHIPPED | OUTPATIENT
Start: 2023-01-28 | End: 2023-01-28

## 2023-01-28 RX ORDER — METOCLOPRAMIDE HYDROCHLORIDE 5 MG/ML
10 INJECTION INTRAMUSCULAR; INTRAVENOUS ONCE
Status: COMPLETED | OUTPATIENT
Start: 2023-01-28 | End: 2023-01-28

## 2023-01-30 RX ORDER — AMLODIPINE BESYLATE 10 MG/1
10 TABLET ORAL NIGHTLY
Qty: 90 TABLET | Refills: 1 | Status: SHIPPED | OUTPATIENT
Start: 2023-01-30

## 2023-01-30 NOTE — TELEPHONE ENCOUNTER
Please review. Protocol failed / No Protocol. Requested Prescriptions   Pending Prescriptions Disp Refills    AMLODIPINE 10 MG Oral Tab [Pharmacy Med Name: amLODIPine Besylate 10 MG Oral Tablet] 90 tablet 0     Sig: Take 1 tablet by mouth nightly       Hypertensive Medications Protocol Failed - 1/28/2023  2:34 PM        Failed - EGFRCR or GFRAA > 50     GFR Evaluation  EGFRCR: 39 , resulted on 1/28/2023          Passed - In person appointment in the past 12 or next 3 months     Recent Outpatient Visits              2 weeks ago Primary hypertension    [facility], [address], Rohit Hwang MD    Office Visit    4 months ago Chronic post-traumatic headache, not intractable    [facility], [address], Deondre Interiano MD    Office Visit    7 months ago Abnormal mammogram of right breast    [facility], [address] [facility], Chay Cordova MD    Office Visit    7 months ago Preop general physical exam    [address], [place], Rohit Hwang MD    Office Visit    8 months ago Therapeutic drug monitoring    [facility], [place] [facility] gle[facility]    Office Visit          Future Appointments         Provider Department Appt Notes    In 2 days [address], [address]  RTE unable to respond in current time. sxc    In 2 days [address] MR RM3 (3T WIDE) [facility] MRI QA SLS [employer] FORM*     RTE unable to respond in current time. sxc    In 2 days [address] Mammography QA SLS [employer]B  INSFORM*    RTE unable to respond in current time.  sxc               Passed - Last BP reading less than 140/90     BP Readings from Last 1 Encounters:  01/28/23 : 114/81              Passed - CMP or BMP in past 6 months     Recent Results (from the past 4392 hour(s))   Comp Metabolic Panel (14)    Collection Time: 01/28/23 10:46 AM   Result Value Ref Range    Glucose 103 (H) 70 - 99 mg/dL    Sodium 143 136 - 145 mmol/L    Potassium 4.4 3.5 - 5.1 mmol/L    Chloride 114 (H) 98 - 112 mmol/L    CO2 25.0 21.0 - 32.0 mmol/L    Anion Gap 4 0 - 18 mmol/L    BUN 19 (H) 7 - 18 mg/dL    Creatinine 1.56 (H) 0.55 - 1.02 mg/dL    Calcium, Total 9.1 8.5 - 10.1 mg/dL    Calculated Osmolality 299 (H) 275 - 295 mOsm/kg    eGFR-Cr 39 (L) >=60 mL/min/1.73m2    AST 24 15 - 37 U/L    ALT 26 13 - 56 U/L    Alkaline Phosphatase 141 (H) 41 - 108 U/L    Bilirubin, Total 0.1 0.1 - 2.0 mg/dL    Total Protein 7.3 6.4 - 8.2 g/dL    Albumin 3.0 (L) 3.4 - 5.0 g/dL    Globulin  4.3 2.8 - 4.4 g/dL    A/G Ratio 0.7 (L) 1.0 - 2.0     *Note: Due to a large number of results and/or encounters for the requested time period, some results have not been displayed. A complete set of results can be found in Results Review.                Passed - In person appointment or virtual visit in the past 6 months     Recent Outpatient Visits              2 weeks ago Primary hypertension    [facility], [address], [place] Dennis MD    Office Visit    4 months ago Chronic post-traumatic headache, not intractable    [facility], [address], Deondre Interiano MD    Office Visit    7 months ago Abnormal mammogram of right breast    [facility], [address] [facility], Cande Roberts MD    Office Visit    7 months ago Preop general physical exam    [facility], [address], [place] Dennis MD    Office Visit    8 months ago Therapeutic drug monitoring    [facility], [facility], [address] APRN    Office Visit          Future Appointments         Provider Department Appt Notes    In 2 days [address], [address]* epic  RTE unable to respond in current time. sxc    In 2 days [address] [facility] MR RM3 (3T WIDE) [facility] MRI QA SLS CONSENT [employer] FORM*     RTE unable to respond in current time. sxc    In 2 days [address] [facility] [address] Mammography QA SLS CONSENT COB  INSFORM*    RTE unable to respond in current time.  sxc

## 2023-02-01 ENCOUNTER — LAB ENCOUNTER (OUTPATIENT)
Dept: LAB | Facility: HOSPITAL | Age: 58
End: 2023-02-01
Attending: INTERNAL MEDICINE
Payer: MEDICAID

## 2023-02-01 ENCOUNTER — HOSPITAL ENCOUNTER (OUTPATIENT)
Dept: MAMMOGRAPHY | Facility: HOSPITAL | Age: 58
Discharge: HOME | End: 2023-02-01
Attending: SURGERY
Payer: MEDICAID

## 2023-02-01 DIAGNOSIS — E55.9 VITAMIN D DEFICIENCY: ICD-10-CM

## 2023-02-01 DIAGNOSIS — E78.2 MIXED HYPERLIPIDEMIA: ICD-10-CM

## 2023-02-01 DIAGNOSIS — R92.8 ABNORMAL MAMMOGRAM OF RIGHT BREAST: ICD-10-CM

## 2023-02-01 LAB
ALBUMIN SERPL-MCNC: 3.7 G/DL (ref 3.4–5)
ALBUMIN/GLOB SERPL: 0.8 {RATIO} (ref 1–2)
ALP LIVER SERPL-CCNC: 138 U/L
ALT SERPL-CCNC: 38 U/L
ANION GAP SERPL CALC-SCNC: 7 MMOL/L (ref 0–18)
AST SERPL-CCNC: 21 U/L (ref 15–37)
BILIRUB SERPL-MCNC: 0.3 MG/DL (ref 0.1–2)
BUN BLD-MCNC: 18 MG/DL (ref 7–18)
CALCIUM BLD-MCNC: 9.8 MG/DL (ref 8.5–10.1)
CHLORIDE SERPL-SCNC: 105 MMOL/L (ref 98–112)
CHOLEST SERPL-MCNC: 247 MG/DL (ref ?–200)
CO2 SERPL-SCNC: 22 MMOL/L (ref 21–32)
CREAT BLD-MCNC: 1.44 MG/DL
FASTING PATIENT LIPID ANSWER: YES
FASTING STATUS PATIENT QL REPORTED: YES
GFR SERPLBLD BASED ON 1.73 SQ M-ARVRAT: 43 ML/MIN/1.73M2 (ref 60–?)
GLOBULIN PLAS-MCNC: 4.9 G/DL (ref 2.8–4.4)
GLUCOSE BLD-MCNC: 121 MG/DL (ref 70–99)
HDLC SERPL-MCNC: 58 MG/DL (ref 40–59)
LDLC SERPL CALC-MCNC: 166 MG/DL (ref ?–100)
NONHDLC SERPL-MCNC: 189 MG/DL (ref ?–130)
OSMOLALITY SERPL CALC.SUM OF ELEC: 281 MOSM/KG (ref 275–295)
POTASSIUM SERPL-SCNC: 3.7 MMOL/L (ref 3.5–5.1)
PROT SERPL-MCNC: 8.6 G/DL (ref 6.4–8.2)
SODIUM SERPL-SCNC: 134 MMOL/L (ref 136–145)
TRIGL SERPL-MCNC: 130 MG/DL (ref 30–149)
VIT D+METAB SERPL-MCNC: 24.7 NG/ML (ref 30–100)
VLDLC SERPL CALC-MCNC: 26 MG/DL (ref 0–30)

## 2023-02-01 PROCEDURE — 36415 COLL VENOUS BLD VENIPUNCTURE: CPT

## 2023-02-01 PROCEDURE — 77062 BREAST TOMOSYNTHESIS BI: CPT | Performed by: SURGERY

## 2023-02-01 PROCEDURE — 77066 DX MAMMO INCL CAD BI: CPT | Performed by: SURGERY

## 2023-02-01 PROCEDURE — 80053 COMPREHEN METABOLIC PANEL: CPT

## 2023-02-01 PROCEDURE — 80061 LIPID PANEL: CPT

## 2023-02-01 PROCEDURE — 82306 VITAMIN D 25 HYDROXY: CPT

## 2023-02-08 DIAGNOSIS — F41.9 ANXIETY: ICD-10-CM

## 2023-02-08 RX ORDER — DIAZEPAM 5 MG/1
TABLET ORAL
Qty: 60 TABLET | Refills: 0 | OUTPATIENT
Start: 2023-02-08

## 2023-02-08 RX ORDER — DIAZEPAM 5 MG/1
5 TABLET ORAL EVERY 12 HOURS PRN
Qty: 60 TABLET | Refills: 0 | Status: SHIPPED | OUTPATIENT
Start: 2023-02-08

## 2023-02-09 ENCOUNTER — HOSPITAL ENCOUNTER (OUTPATIENT)
Dept: MAMMOGRAPHY | Facility: HOSPITAL | Age: 58
Discharge: HOME | End: 2023-02-09
Attending: SURGERY
Payer: MEDICAID

## 2023-02-09 DIAGNOSIS — R92.2 INCONCLUSIVE MAMMOGRAM: ICD-10-CM

## 2023-02-09 DIAGNOSIS — R92.1 BREAST CALCIFICATION, RIGHT: Primary | ICD-10-CM

## 2023-02-09 PROCEDURE — 76642 ULTRASOUND BREAST LIMITED: CPT | Performed by: SURGERY

## 2023-02-09 PROCEDURE — 77066 DX MAMMO INCL CAD BI: CPT | Performed by: SURGERY

## 2023-02-09 PROCEDURE — 77062 BREAST TOMOSYNTHESIS BI: CPT | Performed by: SURGERY

## 2023-02-11 ENCOUNTER — TELEPHONE (OUTPATIENT)
Dept: INTERNAL MEDICINE CLINIC | Facility: CLINIC | Age: 58
End: 2023-02-11

## 2023-02-11 DIAGNOSIS — R74.8 ELEVATED ALKALINE PHOSPHATASE LEVEL: ICD-10-CM

## 2023-02-11 DIAGNOSIS — E88.09 HYPERPROTEINEMIA: ICD-10-CM

## 2023-02-11 DIAGNOSIS — R79.89 ELEVATED SERUM CREATININE: Primary | ICD-10-CM

## 2023-02-11 NOTE — TELEPHONE ENCOUNTER
Patient requesting medication Lovastatin 40 mg sent to [facility]

## 2023-02-11 NOTE — TELEPHONE ENCOUNTER
Please review. Protocol failed / No protocol. Requested Prescriptions   Pending Prescriptions Disp Refills    Lovastatin 40 MG Oral Tab 90 tablet 1     Sig: Take 1 tablet (40 mg total) by mouth nightly.        Cholesterol Medication Protocol Failed - 2/11/2023  1:54 PM        Failed - Last LDL < 130     Lab Results   Component Value Date     (H) 02/01/2023             Passed - ALT in past 12 months        Passed - LDL in past 12 months        Passed - Last ALT < 80     Lab Results   Component Value Date    ALT 38 02/01/2023             Passed - In person appointment or virtual visit in the past 12 mos or appointment in next 3 mos     Recent Outpatient Visits              1 month ago Primary hypertension    [address], Cheryl Farrell MD    Office Visit    5 months ago Chronic post-traumatic headache, not intractable    [facility], [address], Rayne Smith MD    Office Visit    7 months ago Abnormal mammogram of right breast    Sana Rojas MD    Office Visit    8 months ago Preop general physical exam    [address], Cheryl Farrell MD    Office Visit    9 months ago Therapeutic drug monitoring    [facility] Solano, [place] [facility], [facility], APRN    Office Visit                               Recent Outpatient Visits              1 month ago Primary hypertension    [address], Cheryl Farrell MD    Office Visit    5 months ago Chronic post-traumatic headache, not intractable    [address], Rayne Smith MD    Office Visit    7 months ago Abnormal mammogram of right breast    Sana Rojas MD    Office Visit    8 months ago Preop general physical exam    Jefferson Mendez MD    Office Visit    9 months ago Therapeutic drug monitoring    [address] [facility], [address] [facility] Kennedy Vora APRN    Office Visit

## 2023-02-12 RX ORDER — LOVASTATIN 40 MG/1
40 TABLET ORAL NIGHTLY
Qty: 90 TABLET | Refills: 1 | Status: SHIPPED | OUTPATIENT
Start: 2023-02-12

## 2023-02-21 ENCOUNTER — OFFICE VISIT (OUTPATIENT)
Dept: NEUROLOGY | Facility: CLINIC | Age: 58
End: 2023-02-21
Payer: MEDICAID

## 2023-02-21 VITALS
SYSTOLIC BLOOD PRESSURE: 146 MMHG | WEIGHT: 224 LBS | HEIGHT: 64 IN | BODY MASS INDEX: 38.24 KG/M2 | HEART RATE: 78 BPM | DIASTOLIC BLOOD PRESSURE: 88 MMHG

## 2023-02-21 DIAGNOSIS — G44.329 CHRONIC POST-TRAUMATIC HEADACHE, NOT INTRACTABLE: ICD-10-CM

## 2023-02-21 PROCEDURE — 3079F DIAST BP 80-89 MM HG: CPT | Performed by: OTHER

## 2023-02-21 PROCEDURE — 3008F BODY MASS INDEX DOCD: CPT | Performed by: OTHER

## 2023-02-21 PROCEDURE — 99213 OFFICE O/P EST LOW 20 MIN: CPT | Performed by: OTHER

## 2023-02-21 PROCEDURE — 3077F SYST BP >= 140 MM HG: CPT | Performed by: OTHER

## 2023-02-21 RX ORDER — DIAZEPAM 5 MG/1
5 TABLET ORAL EVERY 12 HOURS PRN
Qty: 60 TABLET | Refills: 0 | Status: SHIPPED | OUTPATIENT
Start: 2023-02-21

## 2023-03-06 DIAGNOSIS — G44.329 CHRONIC POST-TRAUMATIC HEADACHE, NOT INTRACTABLE: ICD-10-CM

## 2023-03-07 RX ORDER — AMITRIPTYLINE HYDROCHLORIDE 75 MG/1
TABLET, FILM COATED ORAL
Qty: 30 TABLET | Refills: 0 | Status: SHIPPED | OUTPATIENT
Start: 2023-03-07

## 2023-03-07 RX ORDER — BUTALBITAL, ACETAMINOPHEN AND CAFFEINE 50; 325; 40 MG/1; MG/1; MG/1
TABLET ORAL
Qty: 60 TABLET | Refills: 0 | Status: SHIPPED | OUTPATIENT
Start: 2023-03-07

## 2023-03-07 NOTE — TELEPHONE ENCOUNTER
Refill Request    Medication:Amitriptyline HCl 75 MG Oral Tablet   Sig - Route: Take 1 tablet (75 mg total) by mouth nightly.  - Oral    LOV:02/21/2023  NOV:none    [place]09[place]06/2022      Refill Request    Medication:Butalbital-APAP-Caffeine -40 MG Oral Tablet   Sig: Take 1 tablet by mouth twice daily as needed    LOV:02/21/2023  NOV:none    Last [place]ill/[place]12/13/2022

## 2023-03-24 ENCOUNTER — HOSPITAL ENCOUNTER (OUTPATIENT)
Dept: ULTRASOUND IMAGING | Facility: HOSPITAL | Age: 58
Discharge: HOME | End: 2023-03-24
Attending: INTERNAL MEDICINE
Payer: MEDICAID

## 2023-03-24 DIAGNOSIS — R79.89 ELEVATED SERUM CREATININE: ICD-10-CM

## 2023-03-24 PROCEDURE — 76775 US EXAM ABDO BACK WALL LIM: CPT | Performed by: INTERNAL MEDICINE

## 2023-03-25 ENCOUNTER — TELEPHONE (OUTPATIENT)
Dept: INTERNAL MEDICINE CLINIC | Facility: CLINIC | Age: 58
End: 2023-03-25

## 2023-03-25 DIAGNOSIS — R79.89 ELEVATED SERUM CREATININE: Primary | ICD-10-CM

## 2023-03-27 RX ORDER — MIRTAZAPINE 15 MG/1
15 TABLET, FILM COATED ORAL NIGHTLY
Qty: 90 TABLET | Refills: 0 | Status: SHIPPED | OUTPATIENT
Start: 2023-03-27

## 2023-04-04 DIAGNOSIS — G44.329 CHRONIC POST-TRAUMATIC HEADACHE, NOT INTRACTABLE: ICD-10-CM

## 2023-04-04 RX ORDER — DIAZEPAM 5 MG/1
5 TABLET ORAL EVERY 12 HOURS PRN
Qty: 60 TABLET | Refills: 0 | Status: SHIPPED | OUTPATIENT
Start: 2023-04-04

## 2023-04-04 RX ORDER — BUTALBITAL, ACETAMINOPHEN AND CAFFEINE 50; 325; 40 MG/1; MG/1; MG/1
1 TABLET ORAL 2 TIMES DAILY PRN
Qty: 30 TABLET | Refills: 2 | Status: SHIPPED | OUTPATIENT
Start: 2023-04-04

## 2023-04-04 RX ORDER — AMITRIPTYLINE HYDROCHLORIDE 75 MG/1
75 TABLET, FILM COATED ORAL NIGHTLY
Qty: 30 TABLET | Refills: 5 | Status: SHIPPED | OUTPATIENT
Start: 2023-04-04

## 2023-04-10 RX ORDER — BUTALBITAL, ACETAMINOPHEN AND CAFFEINE 50; 325; 40 MG/1; MG/1; MG/1
1 TABLET ORAL 2 TIMES DAILY PRN
Qty: 60 TABLET | Refills: 2 | OUTPATIENT
Start: 2023-04-10

## 2023-04-16 ENCOUNTER — APPOINTMENT (OUTPATIENT)
Dept: GENERAL RADIOLOGY | Facility: HOSPITAL | Age: 58
End: 2023-04-16
Attending: EMERGENCY MEDICINE
Payer: MEDICAID

## 2023-04-16 ENCOUNTER — APPOINTMENT (OUTPATIENT)
Dept: CT IMAGING | Facility: HOSPITAL | Age: 58
End: 2023-04-16
Attending: EMERGENCY MEDICINE
Payer: MEDICAID

## 2023-04-16 ENCOUNTER — HOSPITAL ENCOUNTER (EMERGENCY)
Facility: HOSPITAL | Age: 58
Discharge: HOME | End: 2023-04-16
Attending: EMERGENCY MEDICINE
Payer: MEDICAID

## 2023-04-16 VITALS
WEIGHT: 220 LBS | OXYGEN SATURATION: 96 % | RESPIRATION RATE: 18 BRPM | HEART RATE: 88 BPM | SYSTOLIC BLOOD PRESSURE: 139 MMHG | TEMPERATURE: 98 F | HEIGHT: 64.57 IN | BODY MASS INDEX: 37.1 KG/M2 | DIASTOLIC BLOOD PRESSURE: 102 MMHG

## 2023-04-16 DIAGNOSIS — S86.912D MUSCLE STRAIN OF LEFT LOWER LEG, SUBSEQUENT ENCOUNTER: Primary | ICD-10-CM

## 2023-04-16 PROCEDURE — 99285 EMERGENCY DEPT VISIT HI MDM: CPT

## 2023-04-16 PROCEDURE — 73552 X-RAY EXAM OF FEMUR 2/>: CPT | Performed by: EMERGENCY MEDICINE

## 2023-04-16 PROCEDURE — 96374 THER/PROPH/DIAG INJ IV PUSH: CPT

## 2023-04-16 PROCEDURE — 73502 X-RAY EXAM HIP UNI 2-3 VIEWS: CPT | Performed by: EMERGENCY MEDICINE

## 2023-04-16 PROCEDURE — 73700 CT LOWER EXTREMITY W/O DYE: CPT | Performed by: EMERGENCY MEDICINE

## 2023-04-16 PROCEDURE — 73590 X-RAY EXAM OF LOWER LEG: CPT | Performed by: EMERGENCY MEDICINE

## 2023-04-16 RX ORDER — HYDROCODONE BITARTRATE AND ACETAMINOPHEN 5; 325 MG/1; MG/1
2 TABLET ORAL ONCE
Status: COMPLETED | OUTPATIENT
Start: 2023-04-16 | End: 2023-04-16

## 2023-04-16 RX ORDER — HYDROCODONE BITARTRATE AND ACETAMINOPHEN 5; 325 MG/1; MG/1
1-2 TABLET ORAL EVERY 6 HOURS PRN
Qty: 12 TABLET | Refills: 0 | Status: SHIPPED | OUTPATIENT
Start: 2023-04-16

## 2023-04-16 RX ORDER — KETOROLAC TROMETHAMINE 30 MG/ML
30 INJECTION, SOLUTION INTRAMUSCULAR; INTRAVENOUS ONCE
Status: COMPLETED | OUTPATIENT
Start: 2023-04-16 | End: 2023-04-16

## 2023-04-16 NOTE — ED INITIAL ASSESSMENT (HPI)
Pt was brought in by EMS from home for left hip pain that radiates down her leg. Pt states that she tripped on a broom on Monday and fell. Pt denies head injury or LOC. [facility] and [facility]. Pt states that the pain has continued and c/o of difficulty ambulating.

## 2023-04-17 NOTE — ED QUICK NOTES
Rounding Completed    Plan of Care and results reviewed with patient and family by ER MD at this time. Bed is locked and in lowest position. Call light within reach. Will continue to monitor while awaiting further orders and disposition.

## 2023-04-17 NOTE — ED QUICK NOTES
Patient returned from x-ray, denies a change in pain.   Will continue to monitor while awaiting ct scan

## 2023-04-17 NOTE — DISCHARGE INSTRUCTIONS
Please do not take hydrocodone with fioricet at home  Rest at home  Local heat to areas of pain at home  Use your walker at home  Please follow up with orthopedic MD  Return to the ER if symptoms worsen or if any other problems arise

## 2023-04-17 NOTE — ED QUICK NOTES
Rounding Completed    Pain medications given as ordered.   Plan of Care reviewed, patient remains stable, to radiology for x-ray

## 2023-04-28 RX ORDER — GABAPENTIN 300 MG/1
300 CAPSULE ORAL 4 TIMES DAILY
Qty: 360 CAPSULE | Refills: 3 | Status: SHIPPED | OUTPATIENT
Start: 2023-04-28

## 2023-04-28 NOTE — TELEPHONE ENCOUNTER
Refill passed per [place] [employer], [facility] protocol. Requested Prescriptions   Pending Prescriptions Disp Refills    gabapentin 300 MG Oral Cap 360 capsule 1     Sig: Take 1 capsule (300 mg total) by mouth 4 (four) times daily. Neurology Medications Passed - 4/26/2023  1:51 PM        Passed - In person appointment or virtual visit in the past 6 mos or appointment in next 3 mos     Recent Outpatient Visits              2 months ago Chronic post-traumatic headache, not intractable    [facility] I[facility]on, [address], Anastasiia Choi MD    Office Visit    3 months ago Primary hypertension    [facility] [address], Stefany Bui MD    Office Visit    7 months ago Chronic post-traumatic headache, not intractable    [facility], [address], Deondre Noguera MD    Office Visit    10 months ago Abnormal mammogram of right breast    [facility], [address] [facility], Reji Macdonald MD    Office Visit    10 months ago Preop general physical exam    David Iverson, Stefany Bui MD    Office Visit          Future Appointments         Provider Department Appt Notes    In 1 week MD Rama Ferreira, 602 [facility] Elevated serum creatinine,    In 3 weeks Fallon Lo MD [facility], [place] [address] [place] [address] Elevated alkaline phosphatase level                 lisinopril 10 MG Oral Tab 180 tablet 0     Sig: Take 1 tablet (10 mg total) by mouth 2 (two) times daily.        Hypertensive Medications Protocol Failed - 4/26/2023  1:51 PM        Failed - Last BP reading less than 140/90     BP Readings from Last 1 Encounters:  04/16/23 : (!) 139/102                Failed - EGFRCR or GFRAA > 50     GFR Evaluation  EGFRCR: 43 , resulted on 2/1/2023            Passed - In person appointment in the past 12 or next 3 months Recent Outpatient Visits              2 months ago Chronic post-traumatic headache, not intractable    [facility], [address], Deondre Interiano MD    Office Visit    3 months ago Primary hypertension    David Wolf, Dennis Mccurdy MD    Office Visit    7 months ago Chronic post-traumatic headache, not intractable    [facility], [address], Deondre Interiano MD    Office Visit    10 months ago Abnormal mammogram of right breast    [facility], [address] [facility], Tash Rai MD    Office Visit    10 months ago Preop general physical exam    David Wolf, Mode Javier MD    Office Visit          Future Appointments         Provider Department Appt Notes    In 1 week MD Lalo Gonzalez, 602 [facility] Elevated serum creatinine,    In 3 weeks Kalani Chávez MD [facility], [address] Elevated alkaline phosphatase level               Passed - CMP or BMP in past 6 months     Recent Results (from the past 4392 hour(s))   COMP METABOLIC PANEL (14)    Collection Time: 02/01/23  9:49 AM   Result Value Ref Range    Glucose 121 (H) 70 - 99 mg/dL    Sodium 134 (L) 136 - 145 mmol/L    Potassium 3.7 3.5 - 5.1 mmol/L    Chloride 105 98 - 112 mmol/L    CO2 22.0 21.0 - 32.0 mmol/L    Anion Gap 7 0 - 18 mmol/L    BUN 18 7 - 18 mg/dL    Creatinine 1.44 (H) 0.55 - 1.02 mg/dL    Calcium, Total 9.8 8.5 - 10.1 mg/dL    Calculated Osmolality 281 275 - 295 mOsm/kg    eGFR-Cr 43 (L) >=60 mL/min/1.73m2    AST 21 15 - 37 U/L    ALT 38 13 - 56 U/L    Alkaline Phosphatase 138 (H) 41 - 108 U/L    Bilirubin, Total 0.3 0.1 - 2.0 mg/dL    Total Protein 8.6 (H) 6.4 - 8.2 g/dL    Albumin 3.7 3.4 - 5.0 g/dL    Globulin  4.9 (H) 2.8 - 4.4 g/dL    A/G Ratio 0.8 (L) 1.0 - 2.0    Patient Fasting for CMP? Yes      *Note: Due to a large number of results and/or encounters for the requested time period, some results have not been displayed. A complete set of results can be found in Results Review.                  Passed - In person appointment or virtual visit in the past 6 months     Recent Outpatient Visits              2 months ago Chronic post-traumatic headache, not intractable    Rashard Watson Nyack, MD    Office Visit    3 months ago Primary hypertension    [address], [address], Angeline Bean MD    Office Visit    7 months ago Chronic post-traumatic headache, not intractable    [facility], [address], Deondre Interiano MD    Office Visit    10 months ago Abnormal mammogram of right breast    [facility], [address] [place] [address], Kayley Coates MD    Office Visit    10 months ago Preop general physical exam    Angeline Watson MD    Office Visit          Future Appointments         Provider Department Appt Notes    In 1 week Antonetta Bumpers, MD [facility] Reji Vieira Elevated serum creatinine,    In 3 weeks Dahiana Michael MD [facility], [place] [address] [facility] Elevated alkaline phosphatase level                   Recent Outpatient Visits              2 months ago Chronic post-traumatic headache, not intractable    Elisa Watson MD    Office Visit    3 months ago Primary hypertension    Angeline Watson MD    Office Visit    7 months ago Chronic post-traumatic headache, not intractable    [address], [address], Elisa Rivera MD    Office Visit    10 months ago Abnormal mammogram of right breast Armani Beck MD    Office Visit    10 months ago Preop general physical exam    [address], [address], Eugenio Lagos MD    Office Visit           Future Appointments         Provider Department Appt Notes    In 1 week Festus Casanova MD [facility], [address] [facility] Elevated serum creatinine,    In 3 weeks Kavya Roberts MD [facility], [place] [address] [facility] Elevated alkaline phosphatase level

## 2023-04-28 NOTE — TELEPHONE ENCOUNTER
Please review. Protocol failed / No protocol. Requested Prescriptions   Pending Prescriptions Disp Refills    lisinopril 10 MG Oral Tab 180 tablet 0     Sig: Take 1 tablet (10 mg total) by mouth 2 (two) times daily.        Hypertensive Medications Protocol Failed - 4/26/2023  1:51 PM        Failed - Last BP reading less than 140/90     BP Readings from Last 1 Encounters:  04/16/23 : (!) 139/102                Failed - EGFRCR or GFRAA > 50     GFR Evaluation  EGFRCR: 43 , resulted on 2/1/2023            Passed - In person appointment in the past 12 or next 3 months     Recent Outpatient Visits              2 months ago Chronic post-traumatic headache, not intractable    [address], [address], Belia Whitney MD    Office Visit    3 months ago Primary hypertension    [address], Aron Miramontes MD    Office Visit    7 months ago Chronic post-traumatic headache, not intractable    [facility], [address], Deondre Noguera MD    Office Visit    10 months ago Abnormal mammogram of right breast    [facility], [address] [facility], Qian Cid MD    Office Visit    10 months ago Preop general physical exam    [address] [facility], Aron Miramontes MD    Office Visit          Future Appointments         Provider Department Appt Notes    In 1 week Ese Weber MD [address], 602 [facility] Elevated serum creatinine,    In 3 weeks Ariel Verdugo MD [facility], [place] 3001 [facility] Elevated alkaline phosphatase level               Passed - CMP or BMP in past 6 months     Recent Results (from the past 4392 hour(s))   COMP METABOLIC PANEL (14)    Collection Time: 02/01/23  9:49 AM   Result Value Ref Range    Glucose 121 (H) 70 - 99 mg/dL    Sodium 134 (L) 136 - 145 mmol/L    Potassium 3.7 3.5 - 5.1 mmol/L    Chloride 105 98 - 112 mmol/L    CO2 22.0 21.0 - 32.0 mmol/L    Anion Gap 7 0 - 18 mmol/L    BUN 18 7 - 18 mg/dL    Creatinine 1.44 (H) 0.55 - 1.02 mg/dL    Calcium, Total 9.8 8.5 - 10.1 mg/dL    Calculated Osmolality 281 275 - 295 mOsm/kg    eGFR-Cr 43 (L) >=60 mL/min/1.73m2    AST 21 15 - 37 U/L    ALT 38 13 - 56 U/L    Alkaline Phosphatase 138 (H) 41 - 108 U/L    Bilirubin, Total 0.3 0.1 - 2.0 mg/dL    Total Protein 8.6 (H) 6.4 - 8.2 g/dL    Albumin 3.7 3.4 - 5.0 g/dL    Globulin  4.9 (H) 2.8 - 4.4 g/dL    A/G Ratio 0.8 (L) 1.0 - 2.0    Patient Fasting for CMP? Yes      *Note: Due to a large number of results and/or encounters for the requested time period, some results have not been displayed. A complete set of results can be found in Results Review.                  Passed - In person appointment or virtual visit in the past 6 months     Recent Outpatient Visits              2 months ago Chronic post-traumatic headache, not intractable    [facility], [address], Kimberly Mcclelland MD    Office Visit    3 months ago Primary hypertension    [facility], [address], Rodrigo Juan MD    Office Visit    7 months ago Chronic post-traumatic headache, not intractable    [facility], [address], Deondre Interiano MD    Office Visit    10 months ago Abnormal mammogram of right breast    [facility], [facility], Andrew Myers MD    Office Visit    10 months ago Preop general physical exam    [address] [facility], Rodrigo Juan MD    Office Visit          Future Appointments         Provider Department Appt Notes    In 1 week Greg Mccarthy MD [facility] Elevated serum creatinine,    In 3 weeks Mai Segovia MD [facility], [place] [address] [facility] Elevated alkaline phosphatase level                Signed Prescriptions Disp Refills    gabapentin 300 MG Oral Cap 360 capsule 3     Sig: Take 1 capsule (300 mg total) by mouth 4 (four) times daily.        Neurology Medications Passed - 4/26/2023  1:51 PM        Passed - In person appointment or virtual visit in the past 6 mos or appointment in next 3 mos     Recent Outpatient Visits              2 months ago Chronic post-traumatic headache, not intractable    [facility], [address], Deondre Interiano MD    Office Visit    3 months ago Primary hypertension    [facility], [address], Cuauhtemoc Santiago MD    Office Visit    7 months ago Chronic post-traumatic headache, not intractable    [facility], [address], Deondre Interiano MD    Office Visit    10 months ago Abnormal mammogram of right breast    [facility], [address] [facility] MD Chava    Office Visit    10 months ago Preop general physical exam    [place], [place] Santiago MD    Office Visit          Future Appointments         Provider Department Appt Notes    In 1 week Danii Weiss MD [email or web address] Elevated serum creatinine,    In 3 weeks Leta Mccullough MD [address], [place] [address] Elevated alkaline phosphatase level                   Future Appointments         Provider Department Appt Notes    In 1 week Danii Weiss MD [address], 602 [facility] Elevated serum creatinine,    In 3 weeks Leta Mccullough MD [facility], [place] [address] [facility] Elevated alkaline phosphatase level           Recent Outpatient Visits              2 months ago Chronic post-traumatic headache, not intractable    Rommel Purcell, Carmen Darling MD Office Visit    3 months ago Primary hypertension    Talon Clarke, Stefany Bui MD    Office Visit    7 months ago Chronic post-traumatic headache, not intractable    Anastasiia Hammer MD    Office Visit    10 months ago Abnormal mammogram of right breast    Ilana Perez MD    Office Visit    10 months ago Preop general physical exam    [facility] Iverson, [address], Stefany Bui MD    Office Visit

## 2023-04-29 RX ORDER — LISINOPRIL 10 MG/1
10 TABLET ORAL 2 TIMES DAILY
Qty: 180 TABLET | Refills: 0 | Status: SHIPPED | OUTPATIENT
Start: 2023-04-29

## 2023-05-21 DIAGNOSIS — G44.329 CHRONIC POST-TRAUMATIC HEADACHE, NOT INTRACTABLE: ICD-10-CM

## 2023-05-22 ENCOUNTER — LAB ENCOUNTER (OUTPATIENT)
Dept: LAB | Facility: HOSPITAL | Age: 58
End: 2023-05-22
Attending: INTERNAL MEDICINE
Payer: MEDICAID

## 2023-05-22 ENCOUNTER — OFFICE VISIT (OUTPATIENT)
Dept: ENDOCRINOLOGY CLINIC | Facility: CLINIC | Age: 58
End: 2023-05-22

## 2023-05-22 VITALS
SYSTOLIC BLOOD PRESSURE: 140 MMHG | WEIGHT: 242 LBS | HEART RATE: 90 BPM | HEIGHT: 64.5 IN | DIASTOLIC BLOOD PRESSURE: 90 MMHG | BODY MASS INDEX: 40.81 KG/M2

## 2023-05-22 DIAGNOSIS — R74.8 HIGH SERUM BONE-SPECIFIC ALKALINE PHOSPHATASE: ICD-10-CM

## 2023-05-22 DIAGNOSIS — Z13.29 THYROID DISORDER SCREENING: ICD-10-CM

## 2023-05-22 DIAGNOSIS — R74.8 HIGH SERUM BONE-SPECIFIC ALKALINE PHOSPHATASE: Primary | ICD-10-CM

## 2023-05-22 DIAGNOSIS — R79.89 ELEVATED SERUM CREATININE: ICD-10-CM

## 2023-05-22 DIAGNOSIS — E88.09 HYPERPROTEINEMIA: ICD-10-CM

## 2023-05-22 DIAGNOSIS — E55.9 VITAMIN D DEFICIENCY: ICD-10-CM

## 2023-05-22 LAB
BILIRUB UR QL: NEGATIVE
GLUCOSE UR-MCNC: NORMAL MG/DL
KETONES UR-MCNC: NEGATIVE MG/DL
LEUKOCYTE ESTERASE UR QL STRIP.AUTO: 250
NITRITE UR QL STRIP.AUTO: NEGATIVE
PH UR: 5.5 [PH] (ref 5–8)
PROT UR-MCNC: NEGATIVE MG/DL
PTH-INTACT SERPL-MCNC: 106.8 PG/ML (ref 18.5–88)
SP GR UR STRIP: 1.02 (ref 1–1.03)
TSI SER-ACNC: 2.52 MIU/ML (ref 0.36–3.74)
UROBILINOGEN UR STRIP-ACNC: NORMAL
VIT D+METAB SERPL-MCNC: 20 NG/ML (ref 30–100)
WBC #/AREA URNS AUTO: >50 /HPF

## 2023-05-22 PROCEDURE — 83970 ASSAY OF PARATHORMONE: CPT

## 2023-05-22 PROCEDURE — 36415 COLL VENOUS BLD VENIPUNCTURE: CPT

## 2023-05-22 PROCEDURE — 84080 ASSAY ALKALINE PHOSPHATASES: CPT

## 2023-05-22 PROCEDURE — 84443 ASSAY THYROID STIM HORMONE: CPT

## 2023-05-22 PROCEDURE — 84165 PROTEIN E-PHORESIS SERUM: CPT

## 2023-05-22 PROCEDURE — 81001 URINALYSIS AUTO W/SCOPE: CPT

## 2023-05-22 PROCEDURE — 3080F DIAST BP >= 90 MM HG: CPT | Performed by: INTERNAL MEDICINE

## 2023-05-22 PROCEDURE — 3077F SYST BP >= 140 MM HG: CPT | Performed by: INTERNAL MEDICINE

## 2023-05-22 PROCEDURE — 99243 OFF/OP CNSLTJ NEW/EST LOW 30: CPT | Performed by: INTERNAL MEDICINE

## 2023-05-22 PROCEDURE — 82306 VITAMIN D 25 HYDROXY: CPT | Performed by: INTERNAL MEDICINE

## 2023-05-22 PROCEDURE — 3008F BODY MASS INDEX DOCD: CPT | Performed by: INTERNAL MEDICINE

## 2023-05-22 PROCEDURE — 86334 IMMUNOFIX E-PHORESIS SERUM: CPT

## 2023-05-22 NOTE — TELEPHONE ENCOUNTER
butalbital-acetaminophen-caffeine -40 MG Oral Tab  Take 1 tablet by mouth 2 (two) times daily as needed.   #30, 2 refills       LOV: 2/21/2023  NOV: None scheduled  [facility]

## 2023-05-22 NOTE — TELEPHONE ENCOUNTER
Patient call to requesting a refill for 60 pill not 30. Also patient stated Susana Lr is aware she needs a 60 quantity if not she will get a seizure .     Please advise

## 2023-05-23 LAB
ALBUMIN SERPL ELPH-MCNC: 4.06 G/DL (ref 3.75–5.21)
ALBUMIN/GLOB SERPL: 1.08 {RATIO} (ref 1–2)
ALPHA1 GLOB SERPL ELPH-MCNC: 0.43 G/DL (ref 0.19–0.46)
ALPHA2 GLOB SERPL ELPH-MCNC: 1.2 G/DL (ref 0.48–1.05)
B-GLOBULIN SERPL ELPH-MCNC: 0.93 G/DL (ref 0.68–1.23)
GAMMA GLOB SERPL ELPH-MCNC: 1.19 G/DL (ref 0.62–1.7)
PROT SERPL-MCNC: 7.8 G/DL (ref 6.4–8.2)

## 2023-05-24 LAB — ALKALINE PHOSPHATASE BONE SPECIFIC: 21.7 UG/L

## 2023-05-24 RX ORDER — BUTALBITAL, ACETAMINOPHEN AND CAFFEINE 50; 325; 40 MG/1; MG/1; MG/1
1 TABLET ORAL 2 TIMES DAILY PRN
Qty: 60 TABLET | Refills: 1 | Status: SHIPPED | OUTPATIENT
Start: 2023-05-24

## 2023-05-27 DIAGNOSIS — G44.329 CHRONIC POST-TRAUMATIC HEADACHE, NOT INTRACTABLE: ICD-10-CM

## 2023-05-27 DIAGNOSIS — M62.838 MUSCLE SPASM: ICD-10-CM

## 2023-05-27 DIAGNOSIS — F41.9 ANXIETY: ICD-10-CM

## 2023-05-27 RX ORDER — AMITRIPTYLINE HYDROCHLORIDE 75 MG/1
75 TABLET, FILM COATED ORAL NIGHTLY
Qty: 30 TABLET | Refills: 5 | Status: CANCELLED | OUTPATIENT
Start: 2023-05-27

## 2023-05-29 ENCOUNTER — TELEPHONE (OUTPATIENT)
Dept: INTERNAL MEDICINE CLINIC | Facility: CLINIC | Age: 58
End: 2023-05-29

## 2023-05-29 DIAGNOSIS — R82.90 ABNORMAL URINE: Primary | ICD-10-CM

## 2023-05-29 DIAGNOSIS — R77.8 ABNORMAL SERUM PROTEIN ELECTROPHORESIS: ICD-10-CM

## 2023-05-30 ENCOUNTER — TELEPHONE (OUTPATIENT)
Dept: ENDOCRINOLOGY CLINIC | Facility: CLINIC | Age: 58
End: 2023-05-30

## 2023-05-30 DIAGNOSIS — R74.8 HIGH SERUM BONE-SPECIFIC ALKALINE PHOSPHATASE: Primary | ICD-10-CM

## 2023-05-30 DIAGNOSIS — E55.9 VITAMIN D DEFICIENCY: ICD-10-CM

## 2023-05-30 RX ORDER — LISINOPRIL 10 MG/1
10 TABLET ORAL 2 TIMES DAILY
Qty: 180 TABLET | Refills: 0 | Status: SHIPPED | OUTPATIENT
Start: 2023-05-30

## 2023-05-30 NOTE — TELEPHONE ENCOUNTER
2 refill requests  _____________    XRZ:[birth year]    RTC in about 6 months  NOV:none    Medication: diazePAM 5 MG Oral Tab, Take 1 tablet (5 mg total) by mouth every 12 (twelve) hours as needed for Anxiety. [place] (#60/0RF)  ORDER PEND & ROUTED TO MD FOR APPROVAL    Medication: amitriptyline 75 MG Oral Tab, Take 1 tablet (75 mg total) by mouth nightly.   [place]: [place] (#30/5RF)  DENIED-PT SHOULD HAVE REFILLS AVAILABLE

## 2023-05-30 NOTE — TELEPHONE ENCOUNTER
PTH is high at 106, should be under 88. This hormone regulate calcuim. Her calcium level has been okay  Her vit D has been low and she recently started replacement  At times a low vit D can cause high PTH    Hence recommend that she c/w vitamin D replacement , but increase from 2000 to 3000 units daily   Get sunlight    Recheck 25 OH vit D, cmp and PTH in 2 months    Her bone marker is on the higher side.  However, low vit D and high PTH can contribute to this  Hence, first we will work on improving these numbers and assess further accoridngly    TSH = thyroid  Normal    Thanks

## 2023-05-30 NOTE — TELEPHONE ENCOUNTER
Please review. Protocol failed/ No protocol. Requested Prescriptions   Pending Prescriptions Disp Refills    lisinopril 10 MG Oral Tab 180 tablet 0     Sig: Take 1 tablet (10 mg total) by mouth 2 (two) times daily.        Hypertensive Medications Protocol Failed - 5/27/2023  9:11 PM        Failed - Last BP reading less than 140/90     BP Readings from Last 1 Encounters:  05/22/23 : 140/90                Failed - EGFRCR or GFRAA > 50     GFR Evaluation  EGFRCR: 43 , resulted on 2/1/2023            Passed - In person appointment in the past 12 or next 3 months     Recent Outpatient Visits              1 week ago High serum bone-specific alkaline phosphatase    Judy Headley MD    Office Visit    3 months ago Chronic post-traumatic headache, not intractable    [facility], [address], Louis Ochoa MD    Office Visit    4 months ago Primary hypertension    [facility] Larson, [address], Maicol Martinez MD    Office Visit    8 months ago Chronic post-traumatic headache, not intractable    [facility], [address], Louis Ochoa MD    Office Visit    11 months ago Abnormal mammogram of right breast    [facility]iel Larson, [address] [place] [address], David Daigle MD    Office Visit          Future Appointments         Provider Department Appt Notes    In 1 week MD Yariel Leahy, [address] [place] [address] Elevated serum creatinine,               Passed - CMP or BMP in past 6 months     Recent Results (from the past 4392 hour(s))   COMP METABOLIC PANEL (14)    Collection Time: 02/01/23  9:49 AM   Result Value Ref Range    Glucose 121 (H) 70 - 99 mg/dL    Sodium 134 (L) 136 - 145 mmol/L    Potassium 3.7 3.5 - 5.1 mmol/L    Chloride 105 98 - 112 mmol/L    CO2 22.0 21.0 - 32.0 mmol/L    Anion Gap 7 0 - 18 mmol/L    BUN 18 7 - 18 mg/dL    Creatinine 1.44 (H) 0.55 - 1.02 mg/dL    Calcium, Total 9.8 8.5 - 10.1 mg/dL    Calculated Osmolality 281 275 - 295 mOsm/kg    eGFR-Cr 43 (L) >=60 mL/min/1.73m2    AST 21 15 - 37 U/L    ALT 38 13 - 56 U/L    Alkaline Phosphatase 138 (H) 41 - 108 U/L    Bilirubin, Total 0.3 0.1 - 2.0 mg/dL    Total Protein 8.6 (H) 6.4 - 8.2 g/dL    Albumin 3.7 3.4 - 5.0 g/dL    Globulin  4.9 (H) 2.8 - 4.4 g/dL    A/G Ratio 0.8 (L) 1.0 - 2.0    Patient Fasting for CMP? Yes      *Note: Due to a large number of results and/or encounters for the requested time period, some results have not been displayed. A complete set of results can be found in Results Review.                  Passed - In person appointment or virtual visit in the past 6 months     Recent Outpatient Visits              1 week ago High serum bone-specific alkaline phosphatase    Haley Martin MD    Office Visit    3 months ago Chronic post-traumatic headache, not intractable    [address] [facility], Brenda Perea MD    Office Visit    4 months ago Primary hypertension    [address], [address], Nisha Ramirez MD    Office Visit    8 months ago Chronic post-traumatic headache, not intractable    [address], [address] [facility],[address], Brenda Perea MD    Office Visit    11 months ago Abnormal mammogram of right breast    [place] Husbands, MD    Office Visit          Future Appointments         Provider Department Appt Notes    In 1 week MD Valerie Holliday [place] Elevated serum creatinine,

## 2023-05-31 RX ORDER — DIAZEPAM 5 MG/1
5 TABLET ORAL EVERY 12 HOURS PRN
Qty: 60 TABLET | Refills: 0 | Status: SHIPPED | OUTPATIENT
Start: 2023-05-31

## 2023-05-31 NOTE — TELEPHONE ENCOUNTER
KIM and sent [facility] with message below - patient advised to repeat labs in 2 months and contact clinic with any questions  Lab orders placed

## 2023-06-06 ENCOUNTER — OFFICE VISIT (OUTPATIENT)
Dept: HEMATOLOGY/ONCOLOGY | Facility: HOSPITAL | Age: 58
End: 2023-06-06
Attending: INTERNAL MEDICINE
Payer: MEDICAID

## 2023-06-06 VITALS
TEMPERATURE: 99 F | HEART RATE: 101 BPM | HEIGHT: 64 IN | SYSTOLIC BLOOD PRESSURE: 116 MMHG | RESPIRATION RATE: 16 BRPM | OXYGEN SATURATION: 98 % | DIASTOLIC BLOOD PRESSURE: 86 MMHG | WEIGHT: 231 LBS | BODY MASS INDEX: 39.44 KG/M2

## 2023-06-06 DIAGNOSIS — N18.9 CHRONIC KIDNEY DISEASE, UNSPECIFIED CKD STAGE: ICD-10-CM

## 2023-06-06 DIAGNOSIS — R77.8 ELEVATED TOTAL PROTEIN: Primary | ICD-10-CM

## 2023-06-06 PROCEDURE — 99204 OFFICE O/P NEW MOD 45 MIN: CPT | Performed by: INTERNAL MEDICINE

## 2023-06-07 NOTE — CONSULTS
[facility]    PATIENT'S NAME: Yadi Nipple   CONSULTING PHYSICIAN: Carol Hudson. Lia Causey MD   PATIENT ACCOUNT #: [de-identified] LOCATION: [address] [place] [address] RECORD #: [id number] YOB: 1965   CONSULTATION DATE: 06/06/2023       [facility] NEW PATIENT CONSULT    REQUESTING PHYSICIAN:  Edel Dleacruz. Master Weathers MD     REASON FOR CONSULTATION:  Elevated total protein. HISTORY OF PRESENT ILLNESS:  The patient is a pleasant 82-year-old  female with chronic kidney disease being evaluated by Hematology for elevated total protein. The patient had a total protein level of 8.6 on labs 02/01/2023. Creatinine was 1.44. Globulin fraction was 4.8. The patient had serum protein electrophoresis and immunofixation 05/22/2023 without any evidence of a monoclonal protein. There was elevation in alpha-2 globulins at 1.2. CBC as of 01/28/2023 shows white blood cell count 10,000, hemoglobin 13.6, platelets 913,491. On interview, the patient states she feels reasonably well over. Denies any fever or chills. She does have an upcoming appointment with Nephrology. PAST MEDICAL HISTORY:  Hyperlipidemia, chronic kidney disease, vitamin D deficiency, renal cysts. MEDICATIONS:  Diazepam, lisinopril, gabapentin, amitriptyline, mirtazapine, lovastatin, amlodipine, albuterol. ALLERGIES:  Penicillins. SOCIAL HISTORY:  She smokes a couple cigarettes a day. Denies any significant alcohol use. Denies any illicit drug use. FAMILY MEDICAL HISTORY:  No history of any hematologic malignancy in the family, specifically mother and father. REVIEW OF SYSTEMS:  All other systems reviewed, negative x12. PHYSICAL EXAMINATION:    GENERAL:  No acute distress. Alert and oriented. VITAL SIGNS:  ECOG performance status is 0. Weight is 104 kg. Blood pressure is 116/86, pulse 90, respiratory rate 16, temperature 37.1 Celsius. HEENT:  Moist mucous membranes. Oropharynx clear.    NECK: Supple. LUNGS:  Symmetric expansion. HEART:  Good distal pulses. ABDOMEN:  Soft. EXTREMITIES:  No edema. SKIN:  No visible lesions. LYMPHATICS:  No visible adenopathy. NEUROLOGIC:  Moving all extremities. PSYCHIATRIC:  Appropriate mood, appropriate affect. MUSCULOSKELETAL:  No deformity. LABORATORY DATA:  Reviewed as per HPI. Please see above for details. ASSESSMENT AND PLAN:  The patient is a pleasant 63-year-old female with chronic kidney disease being evaluated by Hematology for elevated protein. Serum protein electrophoresis and immunofixation do not show any monoclonal changes. We will send serum free light chains to complete her evaluation to ensure this is not from a clonal plasma cell dyscrasia. Serum protein electrophoresis does show some reactive changes. With regard to her chronic kidney disease, she has an appointment with Nephrology upcoming. Thank you very much for this consultation request and allowing us to participate in the care of this delightful patient. Dictated By Andrade [place] MD  d: 06/06/2023 16:02:19  t: 06/06/2023 18:57:00  Job [id number]  KI/    cc: Saeed Devi MD

## 2023-06-08 LAB
KAPPA LC FREE SER-MCNC: 2.96 MG/DL (ref 0.33–1.94)
KAPPA LC FREE/LAMBDA FREE SER NEPH: 1.9 {RATIO} (ref 0.26–1.65)
LAMBDA LC FREE SERPL-MCNC: 1.56 MG/DL (ref 0.57–2.63)

## 2023-06-09 ENCOUNTER — TELEPHONE (OUTPATIENT)
Dept: HEMATOLOGY/ONCOLOGY | Facility: HOSPITAL | Age: 58
End: 2023-06-09

## 2023-06-09 DIAGNOSIS — R77.8 ELEVATED TOTAL PROTEIN: Primary | ICD-10-CM

## 2023-06-09 NOTE — TELEPHONE ENCOUNTER
Light chain results reviewed with Dr Storm Blizzard. Per Dr recommendations, patient contacted and advised there is a mildly elevated protein result. Dr Storm Blizzard is not alarmed by this but recommends a recheck in about 9 months to ensure there is no trend / and or  to see if it normalizes. Patient confirms understanding and was appreciative of the call.

## 2023-06-27 DIAGNOSIS — M62.838 MUSCLE SPASM: ICD-10-CM

## 2023-06-27 DIAGNOSIS — F41.9 ANXIETY: ICD-10-CM

## 2023-06-27 RX ORDER — DIAZEPAM 5 MG/1
5 TABLET ORAL EVERY 12 HOURS PRN
Qty: 60 TABLET | Refills: 0 | Status: SHIPPED | OUTPATIENT
Start: 2023-06-27

## 2023-07-07 ENCOUNTER — OFFICE VISIT (OUTPATIENT)
Dept: NEPHROLOGY | Facility: CLINIC | Age: 58
End: 2023-07-07

## 2023-07-07 VITALS
HEART RATE: 92 BPM | DIASTOLIC BLOOD PRESSURE: 86 MMHG | SYSTOLIC BLOOD PRESSURE: 118 MMHG | WEIGHT: 233 LBS | BODY MASS INDEX: 39.78 KG/M2 | HEIGHT: 64 IN

## 2023-07-07 DIAGNOSIS — N18.32 STAGE 3B CHRONIC KIDNEY DISEASE (HCC): Primary | ICD-10-CM

## 2023-07-07 PROCEDURE — 3074F SYST BP LT 130 MM HG: CPT | Performed by: INTERNAL MEDICINE

## 2023-07-07 PROCEDURE — 3079F DIAST BP 80-89 MM HG: CPT | Performed by: INTERNAL MEDICINE

## 2023-07-07 PROCEDURE — 99245 OFF/OP CONSLTJ NEW/EST HI 55: CPT | Performed by: INTERNAL MEDICINE

## 2023-07-07 PROCEDURE — 3008F BODY MASS INDEX DOCD: CPT | Performed by: INTERNAL MEDICINE
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
## 2023-07-15 ENCOUNTER — LAB ENCOUNTER (OUTPATIENT)
Dept: LAB | Facility: HOSPITAL | Age: 58
End: 2023-07-15
Attending: INTERNAL MEDICINE
Payer: MEDICAID

## 2023-07-15 DIAGNOSIS — N18.32 STAGE 3B CHRONIC KIDNEY DISEASE (HCC): ICD-10-CM

## 2023-07-15 LAB
ALBUMIN SERPL-MCNC: 3.6 G/DL (ref 3.4–5)
ANION GAP SERPL CALC-SCNC: 7 MMOL/L (ref 0–18)
BASOPHILS # BLD AUTO: 0.05 X10(3) UL (ref 0–0.2)
BASOPHILS NFR BLD AUTO: 0.7 %
BILIRUB UR QL STRIP.AUTO: NEGATIVE
BUN BLD-MCNC: 11 MG/DL (ref 7–18)
C3 SERPL-MCNC: 163 MG/DL (ref 90–180)
C4 SERPL-MCNC: 33.2 MG/DL (ref 10–40)
CALCIUM BLD-MCNC: 9.3 MG/DL (ref 8.5–10.1)
CHLORIDE SERPL-SCNC: 108 MMOL/L (ref 98–112)
CO2 SERPL-SCNC: 22 MMOL/L (ref 21–32)
COLOR UR AUTO: YELLOW
CREAT BLD-MCNC: 1.13 MG/DL
CREAT UR-SCNC: 263 MG/DL
CRP SERPL-MCNC: 1.66 MG/DL (ref ?–0.3)
EOSINOPHIL # BLD AUTO: 0.11 X10(3) UL (ref 0–0.7)
EOSINOPHIL NFR BLD AUTO: 1.4 %
ERYTHROCYTE [DISTWIDTH] IN BLOOD BY AUTOMATED COUNT: 13.7 %
ERYTHROCYTE [SEDIMENTATION RATE] IN BLOOD: 67 MM/HR
GFR SERPLBLD BASED ON 1.73 SQ M-ARVRAT: 57 ML/MIN/1.73M2 (ref 60–?)
GLUCOSE BLD-MCNC: 123 MG/DL (ref 70–99)
GLUCOSE UR STRIP.AUTO-MCNC: NEGATIVE MG/DL
HCT VFR BLD AUTO: 45.8 %
HGB BLD-MCNC: 14.7 G/DL
IMM GRANULOCYTES # BLD AUTO: 0.02 X10(3) UL (ref 0–1)
IMM GRANULOCYTES NFR BLD: 0.3 %
KETONES UR STRIP.AUTO-MCNC: NEGATIVE MG/DL
LYMPHOCYTES # BLD AUTO: 4.96 X10(3) UL (ref 1–4)
LYMPHOCYTES NFR BLD AUTO: 65.3 %
MCH RBC QN AUTO: 27.4 PG (ref 26–34)
MCHC RBC AUTO-ENTMCNC: 32.1 G/DL (ref 31–37)
MCV RBC AUTO: 85.3 FL
MICROALBUMIN UR-MCNC: 1.02 MG/DL
MICROALBUMIN/CREAT 24H UR-RTO: 3.9 UG/MG (ref ?–30)
MONOCYTES # BLD AUTO: 0.4 X10(3) UL (ref 0.1–1)
MONOCYTES NFR BLD AUTO: 5.3 %
NEUTROPHILS # BLD AUTO: 2.05 X10 (3) UL (ref 1.5–7.7)
NEUTROPHILS # BLD AUTO: 2.05 X10(3) UL (ref 1.5–7.7)
NEUTROPHILS NFR BLD AUTO: 27 %
NITRITE UR QL STRIP.AUTO: NEGATIVE
OSMOLALITY SERPL CALC.SUM OF ELEC: 285 MOSM/KG (ref 275–295)
PH UR STRIP.AUTO: 5 [PH] (ref 5–8)
PHOSPHATE SERPL-MCNC: 3.9 MG/DL (ref 2.5–4.9)
PLATELET # BLD AUTO: 299 10(3)UL (ref 150–450)
POTASSIUM SERPL-SCNC: 3.7 MMOL/L (ref 3.5–5.1)
PROT UR STRIP.AUTO-MCNC: NEGATIVE MG/DL
PROT UR-MCNC: 16.9 MG/DL
RBC # BLD AUTO: 5.37 X10(6)UL
RBC UR QL AUTO: NEGATIVE
RHEUMATOID FACT SERPL-ACNC: <10 IU/ML (ref ?–15)
SODIUM SERPL-SCNC: 137 MMOL/L (ref 136–145)
SP GR UR STRIP.AUTO: 1.02 (ref 1–1.03)
UROBILINOGEN UR STRIP.AUTO-MCNC: <2 MG/DL
WBC # BLD AUTO: 7.6 X10(3) UL (ref 4–11)

## 2023-07-15 PROCEDURE — 84166 PROTEIN E-PHORESIS/URINE/CSF: CPT

## 2023-07-15 PROCEDURE — 36415 COLL VENOUS BLD VENIPUNCTURE: CPT

## 2023-07-15 PROCEDURE — 86431 RHEUMATOID FACTOR QUANT: CPT

## 2023-07-15 PROCEDURE — 86235 NUCLEAR ANTIGEN ANTIBODY: CPT

## 2023-07-15 PROCEDURE — 86225 DNA ANTIBODY NATIVE: CPT

## 2023-07-15 PROCEDURE — 82043 UR ALBUMIN QUANTITATIVE: CPT

## 2023-07-15 PROCEDURE — 86039 ANTINUCLEAR ANTIBODIES (ANA): CPT

## 2023-07-15 PROCEDURE — 80069 RENAL FUNCTION PANEL: CPT

## 2023-07-15 PROCEDURE — 86160 COMPLEMENT ANTIGEN: CPT

## 2023-07-15 PROCEDURE — 86140 C-REACTIVE PROTEIN: CPT

## 2023-07-15 PROCEDURE — 84156 ASSAY OF PROTEIN URINE: CPT

## 2023-07-15 PROCEDURE — 86335 IMMUNFIX E-PHORSIS/URINE/CSF: CPT

## 2023-07-15 PROCEDURE — 81001 URINALYSIS AUTO W/SCOPE: CPT

## 2023-07-15 PROCEDURE — 86038 ANTINUCLEAR ANTIBODIES: CPT

## 2023-07-15 PROCEDURE — 82570 ASSAY OF URINE CREATININE: CPT

## 2023-07-15 PROCEDURE — 85652 RBC SED RATE AUTOMATED: CPT

## 2023-07-15 PROCEDURE — 85025 COMPLETE CBC W/AUTO DIFF WBC: CPT

## 2023-07-17 LAB
CENTROMERE IGG SER-ACNC: 0.5 U/ML
ENA JO1 AB SER IA-ACNC: 0.4 U/ML
ENA RNP IGG SER IA-ACNC: 0.9 U/ML
ENA SCL70 IGG SER IA-ACNC: 1.7 U/ML
ENA SM IGG SER IA-ACNC: 0.9 U/ML
ENA SS-A IGG SER IA-ACNC: 0.7 U/ML
ENA SS-B IGG SER IA-ACNC: 0.4 U/ML
NUCLEAR IGG TITR SER IF: POSITIVE {TITER}
U1 SNRNP IGG SER IA-ACNC: 0.8 U/ML

## 2023-07-18 ENCOUNTER — TELEPHONE (OUTPATIENT)
Dept: NEPHROLOGY | Facility: CLINIC | Age: 58
End: 2023-07-18

## 2023-07-18 LAB
ANA NUCLEOLAR TITR SER IF: 160 {TITER}
DSDNA AB TITR SER: <10 {TITER}

## 2023-07-18 NOTE — TELEPHONE ENCOUNTER
Labs are in. Please see soon for follow-up to review and bring in any recent blood pressure readings.

## 2023-07-19 DIAGNOSIS — G44.329 CHRONIC POST-TRAUMATIC HEADACHE, NOT INTRACTABLE: ICD-10-CM

## 2023-07-19 NOTE — TELEPHONE ENCOUNTER
Requested Prescriptions     Pending Prescriptions Disp Refills    butalbital-acetaminophen-caffeine -40 MG Oral Tab 60 tablet 1     Sig: Take 1 tablet by mouth 2 (two) times daily as needed.          Last OV: 2/21/23  Next OV: None  Last refilled: 5/24/23

## 2023-07-20 RX ORDER — BUTALBITAL, ACETAMINOPHEN AND CAFFEINE 50; 325; 40 MG/1; MG/1; MG/1
1 TABLET ORAL 2 TIMES DAILY PRN
Qty: 60 TABLET | Refills: 0 | Status: SHIPPED | OUTPATIENT
Start: 2023-07-20

## 2023-07-26 ENCOUNTER — OFFICE VISIT (OUTPATIENT)
Dept: NEPHROLOGY | Facility: CLINIC | Age: 58
End: 2023-07-26

## 2023-07-26 VITALS
WEIGHT: 234 LBS | DIASTOLIC BLOOD PRESSURE: 97 MMHG | HEART RATE: 88 BPM | SYSTOLIC BLOOD PRESSURE: 120 MMHG | HEIGHT: 64 IN | BODY MASS INDEX: 39.95 KG/M2

## 2023-07-26 DIAGNOSIS — N18.31 STAGE 3A CHRONIC KIDNEY DISEASE (HCC): Primary | ICD-10-CM

## 2023-07-26 PROCEDURE — 3080F DIAST BP >= 90 MM HG: CPT | Performed by: INTERNAL MEDICINE

## 2023-07-26 PROCEDURE — 3008F BODY MASS INDEX DOCD: CPT | Performed by: INTERNAL MEDICINE

## 2023-07-26 PROCEDURE — 99213 OFFICE O/P EST LOW 20 MIN: CPT | Performed by: INTERNAL MEDICINE

## 2023-07-26 PROCEDURE — 3074F SYST BP LT 130 MM HG: CPT | Performed by: INTERNAL MEDICINE
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
## 2023-07-27 DIAGNOSIS — F41.9 ANXIETY: ICD-10-CM

## 2023-07-27 DIAGNOSIS — M62.838 MUSCLE SPASM: ICD-10-CM

## 2023-07-27 NOTE — TELEPHONE ENCOUNTER
Requested Prescriptions     Pending Prescriptions Disp Refills    diazePAM 5 MG Oral Tab 60 tablet 0     Sig: Take 1 tablet (5 mg total) by mouth every 12 (twelve) hours as needed for Anxiety.         LOV: 2/21/23  NOV: none    Last refill/[place]: 6/27/23

## 2023-07-29 ENCOUNTER — TELEPHONE (OUTPATIENT)
Dept: NEUROLOGY | Facility: CLINIC | Age: 58
End: 2023-07-29

## 2023-07-29 DIAGNOSIS — F41.9 ANXIETY: ICD-10-CM

## 2023-07-29 DIAGNOSIS — M62.838 MUSCLE SPASM: ICD-10-CM

## 2023-07-31 RX ORDER — DIAZEPAM 5 MG/1
5 TABLET ORAL EVERY 12 HOURS PRN
Qty: 60 TABLET | Refills: 0 | OUTPATIENT
Start: 2023-07-31

## 2023-07-31 NOTE — TELEPHONE ENCOUNTER
Received a call from patient stating her last pill is today and will need a refill for tomorrow .     Please assist

## 2023-07-31 NOTE — TELEPHONE ENCOUNTER
Requested Prescriptions     Pending Prescriptions Disp Refills    DIAZEPAM 5 MG Oral Tab [Pharmacy Med Name: diazePAM 5 MG Oral Tablet] 60 tablet 0     Sig: TAKE 1 TABLET BY MOUTH EVERY 12 HOURS AS NEEDED FOR ANXIETY     LOV: 2/21/23 (RTC in about 6 months)   NOV: none    Last refill/[place]: 7/2/23 QTY 60    PER LOV 2/21/23:   1. Continue Amitriptyline to 75 mg daily (aware of potential interaction with Mirtazipine and tolerating fine)  2. Continue Fioricet prn ( limit 2-3 per week)  3. On Valium chronically for spasms and anxiety- unable to wean off  4.  Advise to see behavioral health for therapy/counseling

## 2023-07-31 NOTE — TELEPHONE ENCOUNTER
Phone call returned to pt. Advised that we had received her request for diazepam and that the request has been sent to the provider covering for Dr. Levi Patterson. Pt verbalized understanding and voiced that this medication request is urgent.

## 2023-08-08 NOTE — TELEPHONE ENCOUNTER
Please review. Protocol failed / No Protocol.     Requested Prescriptions   Pending Prescriptions Disp Refills    LOVASTATIN 40 MG Oral Tab [Pharmacy Med Name: Lovastatin 40 MG Oral Tablet] 90 tablet 0     Sig: Take 1 tablet by mouth nightly       Cholesterol Medication Protocol Failed - 8/7/2023  1:43 PM        Failed - Last LDL < 130     Lab Results   Component Value Date     (H) 02/01/2023             Passed - ALT in past 12 months        Passed - LDL in past 12 months        Passed - Last ALT < 80     Lab Results   Component Value Date    ALT 38 02/01/2023             Passed - In person appointment or virtual visit in the past 12 mos or appointment in next 3 mos     Recent Outpatient Visits              1 week ago Stage 3a chronic kidney disease [facility])    Meka Michelle MD    Office Visit    1 month ago Stage 3b chronic kidney disease [facility])    [place], Ravi Khan MD    Office Visit    2 months ago Elevated total protein    [facility] Hematology Oncology Dennie Back, MD    Office Visit    2 months ago High serum bone-specific alkaline phosphatase    [facility], [facility], Leah Bhagat MD    Office Visit    5 months ago Chronic post-traumatic headache, not intractable    [facility], [address] [place] [address],[address], Rayne Smith MD    Office Visit          Future Appointments         Provider Department Appt Notes    In 1 month MD Mónica Knight, [address], [place] px \"policy informe\" last px was 04/22/2022    In 7 months Chi Odonnell MD [facility] Hematology Oncology 5 M f/u w/ outpt lab

## 2023-08-10 RX ORDER — LOVASTATIN 40 MG/1
40 TABLET ORAL NIGHTLY
Qty: 90 TABLET | Refills: 1 | Status: SHIPPED | OUTPATIENT
Start: 2023-08-10

## 2023-08-14 RX ORDER — DIAZEPAM 5 MG/1
5 TABLET ORAL EVERY 12 HOURS PRN
Qty: 60 TABLET | Refills: 2 | Status: SHIPPED | OUTPATIENT
Start: 2023-08-14

## 2023-08-18 DIAGNOSIS — G44.329 CHRONIC POST-TRAUMATIC HEADACHE, NOT INTRACTABLE: ICD-10-CM

## 2023-08-18 RX ORDER — BUTALBITAL, ACETAMINOPHEN AND CAFFEINE 50; 325; 40 MG/1; MG/1; MG/1
1 TABLET ORAL 2 TIMES DAILY PRN
Qty: 60 TABLET | Refills: 0 | Status: SHIPPED | OUTPATIENT
Start: 2023-08-18

## 2023-08-18 NOTE — TELEPHONE ENCOUNTER
Requested Prescriptions     Pending Prescriptions Disp Refills    butalbital-acetaminophen-caffeine -40 MG Oral Tab 60 tablet 0     Sig: Take 1 tablet by mouth 2 (two) times daily as needed.       LOV: 2/21/23  RTC in about 6 months   NOV: none    Last refill/[place]MP: 7/20/23 (QTY 60)

## 2023-09-08 ENCOUNTER — OFFICE VISIT (OUTPATIENT)
Dept: INTERNAL MEDICINE CLINIC | Facility: CLINIC | Age: 58
End: 2023-09-08

## 2023-09-08 ENCOUNTER — LAB ENCOUNTER (OUTPATIENT)
Dept: LAB | Age: 58
End: 2023-09-08
Attending: INTERNAL MEDICINE
Payer: MEDICAID

## 2023-09-08 VITALS
HEIGHT: 64 IN | WEIGHT: 244.88 LBS | HEART RATE: 93 BPM | OXYGEN SATURATION: 99 % | BODY MASS INDEX: 41.81 KG/M2 | TEMPERATURE: 96 F | SYSTOLIC BLOOD PRESSURE: 120 MMHG | DIASTOLIC BLOOD PRESSURE: 84 MMHG

## 2023-09-08 DIAGNOSIS — F32.A ANXIETY AND DEPRESSION: ICD-10-CM

## 2023-09-08 DIAGNOSIS — I10 PRIMARY HYPERTENSION: ICD-10-CM

## 2023-09-08 DIAGNOSIS — E78.2 MIXED HYPERLIPIDEMIA: ICD-10-CM

## 2023-09-08 DIAGNOSIS — N60.91 ATYPICAL DUCTAL HYPERPLASIA OF RIGHT BREAST: ICD-10-CM

## 2023-09-08 DIAGNOSIS — Z00.00 ANNUAL PHYSICAL EXAM: Primary | ICD-10-CM

## 2023-09-08 DIAGNOSIS — N18.31 STAGE 3A CHRONIC KIDNEY DISEASE (HCC): ICD-10-CM

## 2023-09-08 DIAGNOSIS — R82.90 ABNORMAL URINE: ICD-10-CM

## 2023-09-08 DIAGNOSIS — R74.8 HIGH SERUM BONE-SPECIFIC ALKALINE PHOSPHATASE: ICD-10-CM

## 2023-09-08 DIAGNOSIS — R56.9 SEIZURE (HCC): ICD-10-CM

## 2023-09-08 DIAGNOSIS — J06.9 VIRAL URI: ICD-10-CM

## 2023-09-08 DIAGNOSIS — E55.9 VITAMIN D DEFICIENCY: ICD-10-CM

## 2023-09-08 DIAGNOSIS — F41.9 ANXIETY AND DEPRESSION: ICD-10-CM

## 2023-09-08 LAB
ALBUMIN SERPL-MCNC: 3.7 G/DL (ref 3.4–5)
ALBUMIN/GLOB SERPL: 0.7 {RATIO} (ref 1–2)
ALP LIVER SERPL-CCNC: 133 U/L
ALT SERPL-CCNC: 25 U/L
ANION GAP SERPL CALC-SCNC: 9 MMOL/L (ref 0–18)
AST SERPL-CCNC: 17 U/L (ref 15–37)
BILIRUB SERPL-MCNC: 0.4 MG/DL (ref 0.1–2)
BUN BLD-MCNC: 15 MG/DL (ref 7–18)
CALCIUM BLD-MCNC: 9.7 MG/DL (ref 8.5–10.1)
CHLORIDE SERPL-SCNC: 106 MMOL/L (ref 98–112)
CHOLEST SERPL-MCNC: 268 MG/DL (ref ?–200)
CO2 SERPL-SCNC: 22 MMOL/L (ref 21–32)
CREAT BLD-MCNC: 1.38 MG/DL
EGFRCR SERPLBLD CKD-EPI 2021: 45 ML/MIN/1.73M2 (ref 60–?)
FASTING PATIENT LIPID ANSWER: NO
FASTING STATUS PATIENT QL REPORTED: NO
GLOBULIN PLAS-MCNC: 5.2 G/DL (ref 2.8–4.4)
GLUCOSE BLD-MCNC: 109 MG/DL (ref 70–99)
HDLC SERPL-MCNC: 43 MG/DL (ref 40–59)
HYALINE CASTS #/AREA URNS AUTO: PRESENT /LPF
LDLC SERPL CALC-MCNC: 199 MG/DL (ref ?–100)
NONHDLC SERPL-MCNC: 225 MG/DL (ref ?–130)
OSMOLALITY SERPL CALC.SUM OF ELEC: 285 MOSM/KG (ref 275–295)
POTASSIUM SERPL-SCNC: 3.4 MMOL/L (ref 3.5–5.1)
PROT SERPL-MCNC: 8.9 G/DL (ref 6.4–8.2)
PTH-INTACT SERPL-MCNC: 69.1 PG/ML (ref 18.5–88)
SODIUM SERPL-SCNC: 137 MMOL/L (ref 136–145)
SP GR UR REFRACTOMETRY: 1.02 (ref 1–1.03)
TRIGL SERPL-MCNC: 142 MG/DL (ref 30–149)
VIT D+METAB SERPL-MCNC: 39.2 NG/ML (ref 30–100)
VLDLC SERPL CALC-MCNC: 30 MG/DL (ref 0–30)

## 2023-09-08 PROCEDURE — 81001 URINALYSIS AUTO W/SCOPE: CPT

## 2023-09-08 PROCEDURE — 83970 ASSAY OF PARATHORMONE: CPT

## 2023-09-08 PROCEDURE — 82306 VITAMIN D 25 HYDROXY: CPT | Performed by: INTERNAL MEDICINE

## 2023-09-08 PROCEDURE — 80053 COMPREHEN METABOLIC PANEL: CPT

## 2023-09-08 PROCEDURE — 87086 URINE CULTURE/COLONY COUNT: CPT

## 2023-09-08 PROCEDURE — 99396 PREV VISIT EST AGE 40-64: CPT | Performed by: INTERNAL MEDICINE

## 2023-09-08 PROCEDURE — 3074F SYST BP LT 130 MM HG: CPT | Performed by: INTERNAL MEDICINE

## 2023-09-08 PROCEDURE — 80061 LIPID PANEL: CPT

## 2023-09-08 PROCEDURE — 3079F DIAST BP 80-89 MM HG: CPT | Performed by: INTERNAL MEDICINE

## 2023-09-08 PROCEDURE — 87088 URINE BACTERIA CULTURE: CPT

## 2023-09-08 PROCEDURE — 87186 SC STD MICRODIL/AGAR DIL: CPT

## 2023-09-08 PROCEDURE — 3008F BODY MASS INDEX DOCD: CPT | Performed by: INTERNAL MEDICINE

## 2023-09-08 PROCEDURE — 36415 COLL VENOUS BLD VENIPUNCTURE: CPT

## 2023-09-08 RX ORDER — FLUTICASONE PROPIONATE 50 MCG
2 SPRAY, SUSPENSION (ML) NASAL DAILY
Qty: 1 EACH | Refills: 0 | Status: SHIPPED | OUTPATIENT
Start: 2023-09-08 | End: 2023-10-08

## 2023-09-08 NOTE — PROGRESS NOTES
Subjective:     Patient ID: Brandon Bolanos is a 66year old female. Patient presents today for her annual physical.  Did complain of having had URI symptoms such as rhinorrhea, sinus congestion and stuffiness. This has been ongoing for 5 days. There has been no fevers. No travel history and denies having had any contact with COVID. He she did not test herself for COVID. She said that she lives with her grandchild alone and no exposure to a lot of people. History/Other:   Review of Systems   Constitutional:  Negative for fever. HENT:  Positive for congestion, ear pain, sinus pressure and sinus pain. Negative for ear discharge, facial swelling, postnasal drip, rhinorrhea, sore throat and voice change. Eyes: Negative. Respiratory: Negative. Cardiovascular: Negative. Gastrointestinal: Negative. Genitourinary: Negative. Hematological: Negative. Current Outpatient Medications   Medication Sig Dispense Refill    butalbital-acetaminophen-caffeine -40 MG Oral Tab Take 1 tablet by mouth 2 (two) times daily as needed. 60 tablet 0    diazePAM 5 MG Oral Tab Take 1 tablet (5 mg total) by mouth every 12 (twelve) hours as needed for Anxiety. 60 tablet 2    Lovastatin 40 MG Oral Tab Take 1 tablet (40 mg total) by mouth nightly. 90 tablet 1    amLODIPine 10 MG Oral Tab Take 1 tablet (10 mg total) by mouth nightly. 90 tablet 0    lisinopril 10 MG Oral Tab Take 1 tablet (10 mg total) by mouth 2 (two) times daily. 180 tablet 0    gabapentin 300 MG Oral Cap Take 1 capsule (300 mg total) by mouth 4 (four) times daily. 360 capsule 3    amitriptyline 75 MG Oral Tab Take 1 tablet (75 mg total) by mouth nightly. 30 tablet 5    mirtazapine 15 MG Oral Tab Take 1 tablet (15 mg total) by mouth nightly. 90 tablet 0    Albuterol Sulfate  (90 Base) MCG/ACT Inhalation Aero Soln Inhale 2 puffs into the lungs every 6 (six) hours as needed for Wheezing.  1 each 0     Allergies:  Baby Oil RASH, RESPIRATORY FAILURE  Clemizole               ANAPHYLAXIS  [place]               RESPIRATORY FAILURE  Cannon Juice [Orang*    RESPIRATORY FAILURE  Penicillins             ANAPHYLAXIS    Past Medical History:   Diagnosis Date    Back pain     Heart murmur     High blood pressure     Hx of head injury 2007    Hx of migraine headaches     Hyperlipidemia     Se[facility]ure disorder ([facility] [address]     Brain spasms    Spasm     Chronic nerve spasms    TBI (traumatic brain injury) ([facility] [address]     Unspecified essential hypertension     Visual impairment     glasses      Past Surgical History:   Procedure Laterality Date    CHOLECYSTECTOMY  1999    COLONOSCOPY N/A 08/17/2017    Procedure: COLONOSCOPY;  Surgeon: Elizabeth Valencia MD;  Location: [address] [place] [address] ENDOSCOPY    ELECTROCARDIOGRAM, COMPLETE  04/16/2012    SCANNED TO MEDIA TAB: 04-    HYSTERECTOMY  2007    partial    EUNICE BIOPSY STEREO NODULE 1 SITE RIGHT (CPT=19081) Right 04/2022    ADH    EUNICE LOCALIZATION WIRE 1 SITE RIGHT (CPT=19281) Right 06/2022    ADH    OTHER Left     rempved mass on hip    TUBAL LIGATION Bilateral 1990      Family History   Problem Relation Age of Onset    Heart Disorder Father         Bypass x 3, heroin addict, kidney stones    No Known Problems Mother     No Known Problems Sister       Social History:   Social History     Socioeconomic History    Marital status:     Number of children: 2   Occupational History    Occupation: Unemployed   Tobacco Use    Smoking status: Every Day     Packs/day: 0.25     Years: 21.00     Pack years: 5.25     Types: Cigarettes     Passive exposure: Current    Smokeless tobacco: Never    Tobacco comments:     smokes only 3-4 sticks /day   Vaping Use    Vaping Use: Never used   Substance and Sexual Activity    Alcohol use: No     Alcohol/week: 0.0 standard drinks of alcohol    Drug use: Yes     Frequency: 7.0 times per week     Types: Cannabis     Comment: daily    Other Topics Concern    Caffeine Concern No    Exercise No Comment: Daily   Social History Narrative    The patient does not use an assistive device. .      The patient does not live in a home with stairs. Objective:   Physical Exam  Constitutional:       General: She is not in acute distress. Appearance: She is well-developed. She is obese. She is not ill-appearing, toxic-appearing or diaphoretic. HENT:      Head: Normocephalic and atraumatic. Right Ear: Tympanic membrane, ear canal and external ear normal.      Left Ear: Tympanic membrane, ear canal and external ear normal.      Nose: Nose normal.      Mouth/Throat:      Pharynx: No oropharyngeal exudate. Eyes:      General: No scleral icterus. Right eye: No discharge. Conjunctiva/sclera: Conjunctivae normal.      Pupils: Pupils are equal, round, and reactive to light. Neck:      Vascular: No carotid bruit or JVD. Cardiovascular:      Rate and Rhythm: Normal rate and regular rhythm. Heart sounds: Normal heart sounds. No murmur heard. Pulmonary:      Effort: Pulmonary effort is normal. No respiratory distress. Breath sounds: Normal breath sounds. No wheezing or rales. Abdominal:      General: Bowel sounds are normal. There is no distension. Palpations: Abdomen is soft. There is no mass. Tenderness: There is no abdominal tenderness. There is no guarding or rebound. Musculoskeletal:         General: No tenderness. Normal range of motion. Cervical back: Normal range of motion and neck supple. No rigidity or tenderness. Right lower leg: No edema. Left lower leg: No edema. Lymphadenopathy:      Cervical: No cervical adenopathy. Skin:     General: Skin is warm and dry. Coloration: Skin is not jaundiced or pale. Findings: No rash. Neurological:      Mental Status: She is alert and oriented to person, place, and time.    Psychiatric:         Mood and Affect: Mood normal.         Assessment & Plan:   (Z00.00) Annual physical exam  (primary encounter diagnosis)  Plan: Patient had declined to get flu shot, pneumococcal vaccines as well as COVID-vaccine.    (I10) Primary hypertension  Plan: Blood pressure controlled with current blood pressure meds. CPM.    (N18.31) Stage 3a chronic kidney disease ([facility] [address]  Plan: Has been following up with our nephrologist Dr. Mae Andino and that this has been felt to be secondary to her hypertension. Continue current blood pressure meds. Patient labs did show improvement of her GFR.    (E78.2) Mixed hyperlipidemia  Plan: LIPID PANEL        Continue with low-fat low-cholesterol diet and current cholesterol medication.    (F41.9,  F32.A) Anxiety and depression  Plan: She continues to see her psychiatrist.  She said that her medications appear to be still effective and working.    (E55.9) Vitamin D deficiency  Plan: Continue vitamin D supplement. (R56.9) Seizure ([facility] [address]  Plan: She has been followed and managed by her neurologist.  Perry Jacob to be on antiseizure meds.    (N60.91) Atypical ductal hyperplasia of right breast  Plan: She has been followed by our surgeon Dr. Herrera Shown patient ready scheduled for her mammogram.        (J06.9) Viral URI  Plan: Discussed with patient suspect viral URI and one of them would be COVID. She is ready on her fifth day of her illness and has minimal symptoms however I did recommend she gets tested for COVID but she did decline. I told her then to continue with symptomatic management. I gave her Flonase nasal spray to use. She was instructed to give us a call if her symptoms do not improve or they worsens. No orders of the defined types were placed in this encounter.       Meds This Visit:  Requested Prescriptions      No prescriptions requested or ordered in this encounter       Imaging & Referrals:  None

## 2023-09-10 ENCOUNTER — TELEPHONE (OUTPATIENT)
Dept: ENDOCRINOLOGY CLINIC | Facility: CLINIC | Age: 58
End: 2023-09-10

## 2023-09-10 DIAGNOSIS — R74.8 ALKALINE PHOSPHATASE ELEVATION: ICD-10-CM

## 2023-09-10 DIAGNOSIS — E28.39 ESTROGEN DEFICIENCY: Primary | ICD-10-CM

## 2023-09-10 DIAGNOSIS — G44.329 CHRONIC POST-TRAUMATIC HEADACHE, NOT INTRACTABLE: ICD-10-CM

## 2023-09-10 DIAGNOSIS — E87.6 LOW SERUM POTASSIUM: ICD-10-CM

## 2023-09-10 NOTE — TELEPHONE ENCOUNTER
Calcium, vit D and PTH are normal  Please continue with current vitamin D replacement  Alk phos has not changed much even after normalization of vitamin D  Hence, recommend that we evaluate this further   Recommend getting a DXA scan too assess density/ strength of her bones  Also recommend getting a bone scan took scree for paget's disease which can also make bones weak and can also raise levels of bone marker   I have ordered both of these   She should please call her insurance to check coverage before doing these      K is slightly low: please have a banana/ orange  daily and repeat labs  in on week   Potassium   Please also FU with PCP for continued evaluation of this  Plaese review symptoms of low K :   weakness. feeling tired. muscle cramps. confusion. constipation. an abnormal heart rhythm (arrhythmia) - skipped heartbeats or an irregular heartbeat. tingling or numbness. increased urination.         Thanks

## 2023-09-11 ENCOUNTER — TELEPHONE (OUTPATIENT)
Dept: INTERNAL MEDICINE CLINIC | Facility: CLINIC | Age: 58
End: 2023-09-11

## 2023-09-11 ENCOUNTER — TELEPHONE (OUTPATIENT)
Dept: ENDOCRINOLOGY CLINIC | Facility: CLINIC | Age: 58
End: 2023-09-11

## 2023-09-11 RX ORDER — BUTALBITAL, ACETAMINOPHEN AND CAFFEINE 50; 325; 40 MG/1; MG/1; MG/1
1 TABLET ORAL 2 TIMES DAILY PRN
Qty: 60 TABLET | Refills: 1 | Status: SHIPPED | OUTPATIENT
Start: 2023-09-11

## 2023-09-11 RX ORDER — MIRTAZAPINE 15 MG/1
15 TABLET, FILM COATED ORAL NIGHTLY
Qty: 90 TABLET | Refills: 1 | Status: SHIPPED | OUTPATIENT
Start: 2023-09-11

## 2023-09-11 NOTE — TELEPHONE ENCOUNTER
Please review; No Protocol  Rx Pended, authorize if appropriate    Requested Prescriptions   Pending Prescriptions Disp Refills    mirtazapine 15 MG Oral Tab 90 tablet 0     Sig: Take 1 tablet (15 mg total) by mouth nightly.        There is no refill protocol information for this order          Recent Outpatient Visits              3 days ago Annual physical exam    [address] [facility], Jones Winchester MD    Office Visit    1 month ago Stage 3a chronic kidney disease [facility])    [address], [address] [facility] [place], MD    Office Visit    2 months ago Stage 3b chronic kidney disease [facility])    Leonel Alfredo MD    Office Visit    3 months ago Elevated total protein    [facility] Hematology Oncology Isis Zaldivar MD    Office Visit    3 months ago High serum bone-specific alkaline phosphatase    [facility], [address] [place] [address], MD Natalya    Office Visit          Future Appointments         Provider Department Appt Notes    In 4 days [address] [facility] [address] Mammography QA SLS CONSENT UPDATE ID*    In 1 month Leotha Lennox, MD [address], [address], [place] The knot by my right eye    In 5 months Eile[facility] Hematology Oncology 5 M f/u w/ outpt lab

## 2023-09-11 NOTE — TELEPHONE ENCOUNTER
Requested Prescriptions     Pending Prescriptions Disp Refills    butalbital-acetaminophen-caffeine -40 MG Oral Tab 60 tablet 0     Sig: Take 1 tablet by mouth 2 (two) times daily as needed.        Last OV: 2/21/23  Next OV: 10/24/23  Last refilled: 8/18/23 #60

## 2023-09-18 ENCOUNTER — TELEPHONE (OUTPATIENT)
Dept: INTERNAL MEDICINE CLINIC | Facility: CLINIC | Age: 58
End: 2023-09-18

## 2023-09-18 RX ORDER — DOXYCYCLINE HYCLATE 100 MG
100 TABLET ORAL 2 TIMES DAILY
Qty: 14 TABLET | Refills: 0 | Status: CANCELLED | OUTPATIENT
Start: 2023-09-18

## 2023-09-18 RX ORDER — DOXYCYCLINE HYCLATE 100 MG/1
100 CAPSULE ORAL 2 TIMES DAILY
Qty: 14 CAPSULE | Refills: 0 | Status: CANCELLED | OUTPATIENT
Start: 2023-09-18

## 2023-09-18 RX ORDER — DOXYCYCLINE 100 MG/1
100 TABLET ORAL 2 TIMES DAILY
Qty: 14 TABLET | Refills: 0 | Status: SHIPPED | OUTPATIENT
Start: 2023-09-18

## 2023-09-21 NOTE — TELEPHONE ENCOUNTER
Tried calling patient on all #s in profile - rang then busy signal    [facility] sent with message below - patient advised to repeat lab in one week and contact clinic with questions

## 2023-09-25 DIAGNOSIS — G44.329 CHRONIC POST-TRAUMATIC HEADACHE, NOT INTRACTABLE: ICD-10-CM

## 2023-09-26 NOTE — TELEPHONE ENCOUNTER
Requested Prescriptions     Pending Prescriptions Disp Refills    AMITRIPTYLINE 75 MG Oral Tab [Pharmacy Med Name: Amitriptyline HCl 75 MG Oral Tablet] 30 tablet 0     Sig: Take 1 tablet by mouth nightly       LOV: 2/21/23  NOV: 10/24/23    Last refill/[place]PMP: 4/4/23 QTY 30/5RF    Per LOV note:  ASSESSMENT/PLAN:      (G44.329) Chronic post-traumatic headache, not intractable        Chronic post traumatic headache. Recent had headache flare up. MRI brain was negative for acute abnormality     1. Continue Amitriptyline to 75 mg daily (aware of potential interaction with Mirtazipine and tolerating fine)  2. Continue Fioricet prn ( limit 2-3 per week)  3. On Valium chronically for spasms and anxiety- unable to wean off  4.  Advise to see behavioral health for therapy/counseling     RTC in about 6 months

## 2023-09-27 RX ORDER — AMITRIPTYLINE HYDROCHLORIDE 75 MG/1
75 TABLET ORAL NIGHTLY
Qty: 30 TABLET | Refills: 5 | Status: SHIPPED | OUTPATIENT
Start: 2023-09-27

## 2023-10-17 NOTE — TELEPHONE ENCOUNTER
Noted. Left detailed message on voicemail if Pt would like to follow-up with Dr. Master Weathers regarding potassium level.

## 2023-10-17 NOTE — TELEPHONE ENCOUNTER
RN triage, patient will be repeating serum potassium level in 1 week per instructions from Dr. Munira Wilson. Dr. Munira Wilson would like further follow up and monitoring to be performed by PCP. Please contact the patient to see if she would like to see Dr. Bridgett Hi regarding this. Thanks.

## 2023-10-17 NOTE — TELEPHONE ENCOUNTER
Returned call to the patient. (Name and  of pt verified). All results and recommendations reviewed. Patient verbalizes understanding, denies further questions and agrees with plan of care. She requested another [facility] message be sent with instructions. Sent. She already has the dexa scan scheduled. Reminded to schedule the NM three phase bone scan. She says she has the number to schedule, I also included it in the email. I ordered the potassium blood test to be repeated in 1 week. She will have a banana every day since she is allergic to oranges. She denies any symptoms of low potassium at this time.

## 2023-10-18 ENCOUNTER — TELEPHONE (OUTPATIENT)
Dept: INTERNAL MEDICINE CLINIC | Facility: CLINIC | Age: 58
End: 2023-10-18

## 2023-10-18 NOTE — TELEPHONE ENCOUNTER
Spoke to patient. She was following up on her potassium that was ordered by Dr. Garry Mcdaniel office. She was told to eat bananas and oranges but patient said she is allergic  to both and is asking if Dr. Aster Negron can order a supplement for her. 9/8/23 CMP K level:    Potassium  3.5 - 5.1 mmol/L 3.4 Low      See message from Dr. Garry Mcdaniel office below:    Encounter Date: 9/10/2023     Signed         RN triage, patient will be repeating serum potassium level in 1 week per instructions from Dr. Aki Fuentes. Dr. Aki Fuentes would like further follow up and monitoring to be performed by PCP. Please contact the patient to see if she would like to see Dr. Aster Negron regarding this. Thanks. RN added banana to patient's allergy list. She has emesis with eating them. Dr. Aster Negron, please advise on Potassium supplement as requested by patient.

## 2023-10-18 NOTE — TELEPHONE ENCOUNTER
I would recommend that she repeat her potassium level as ordered by Dr Magalys Bolivar;  I want to make sure not just lab error she had slightly low potassium level before since she never had it before, and she is not on medicine that can cause low potassium level. If repeat potassium comes back low then will start on poassium supplement .

## 2023-10-19 ENCOUNTER — HOSPITAL ENCOUNTER (OUTPATIENT)
Dept: MAMMOGRAPHY | Facility: HOSPITAL | Age: 58
Discharge: HOME | End: 2023-10-19
Attending: SURGERY
Payer: MEDICAID

## 2023-10-19 DIAGNOSIS — R92.1 BREAST CALCIFICATION, RIGHT: ICD-10-CM

## 2023-10-19 PROCEDURE — 77061 BREAST TOMOSYNTHESIS UNI: CPT | Performed by: SURGERY

## 2023-10-19 PROCEDURE — 77065 DX MAMMO INCL CAD UNI: CPT | Performed by: SURGERY

## 2023-10-24 ENCOUNTER — OFFICE VISIT (OUTPATIENT)
Dept: NEUROLOGY | Facility: CLINIC | Age: 58
End: 2023-10-24

## 2023-10-24 VITALS — DIASTOLIC BLOOD PRESSURE: 89 MMHG | SYSTOLIC BLOOD PRESSURE: 124 MMHG | HEART RATE: 94 BPM

## 2023-10-24 DIAGNOSIS — M62.838 MUSCLE SPASM: ICD-10-CM

## 2023-10-24 DIAGNOSIS — F41.9 ANXIETY: ICD-10-CM

## 2023-10-24 DIAGNOSIS — G44.329 CHRONIC POST-TRAUMATIC HEADACHE, NOT INTRACTABLE: Primary | ICD-10-CM

## 2023-10-24 PROCEDURE — 3074F SYST BP LT 130 MM HG: CPT | Performed by: OTHER

## 2023-10-24 PROCEDURE — 99213 OFFICE O/P EST LOW 20 MIN: CPT | Performed by: OTHER

## 2023-10-24 PROCEDURE — 3079F DIAST BP 80-89 MM HG: CPT | Performed by: OTHER

## 2023-10-24 RX ORDER — DIAZEPAM 5 MG/1
5 TABLET ORAL EVERY 12 HOURS PRN
Qty: 60 TABLET | Refills: 2 | Status: SHIPPED | OUTPATIENT
Start: 2023-10-24
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
## 2023-11-01 RX ORDER — AMLODIPINE BESYLATE 10 MG/1
10 TABLET ORAL NIGHTLY
Qty: 90 TABLET | Refills: 1 | Status: SHIPPED | OUTPATIENT
Start: 2023-11-01

## 2023-11-01 RX ORDER — AMLODIPINE BESYLATE 10 MG/1
10 TABLET ORAL NIGHTLY
Qty: 90 TABLET | Refills: 0 | OUTPATIENT
Start: 2023-11-01

## 2023-11-01 NOTE — TELEPHONE ENCOUNTER
Please review refill protocol failed/ no protocol  Requested Prescriptions   Pending Prescriptions Disp Refills    AMLODIPINE 10 MG Oral Tab [Pharmacy Med Name: amLODIPine Besylate 10 MG Oral Tablet] 90 tablet 0     Sig: TAKE 1 TABLET BY MOUTH NIGHTLY .  APPOINTMENT REQUIRED FOR FUTURE REFILLS       Hypertensive Medications Protocol Failed - 10/30/2023  9:02 PM        Failed - EGFRCR or GFRAA > 50     GFR Evaluation  EGFRCR: 45 , resulted on 9/8/2023          Passed - In person appointment in the past 12 or next 3 months     Recent Outpatient Visits              1 week ago Chronic post-traumatic headache, not intractable    [facility], [address], Citlaly Medina MD    Office Visit    1 month ago Annual physical exam    [address] [facility], Vanessa Ba MD    Office Visit    3 months ago Stage 3a chronic kidney disease [facility])    [address] [facility] [address] [facility],[address], [address] [facility] [place], MD    Office Visit    3 months ago Stage 3b chronic kidney disease [facility])    Sebastián Izquierdo MD    Office Visit    4 months ago Elevated total protein    [facility] Hematology Oncology Marialuisa Htach MD    Office Visit          Future Appointments         Provider Department Appt Notes    In 3 days [address] [facility] Nuclear Medicine     In 3 days [address] ([address]) Nuclear Medicine     In 1 week [address] [facility] for Health What is going on    In 4 months Jordyn Odonnell [address] Hematology Oncology 9 M f/u w/ outpt lab                      Passed - Last BP reading less than 140/90     BP Readings from Last 1 Encounters:  10/24/23 : 124/89              Passed - CMP or BMP in past 6 months     Recent Results (from the past 4392 hour(s))   Comp Metabolic Panel [E]    Collection Time: 09/08/23  2:34 PM   Result Value Ref Range    Glucose 109 (H) 70 - 99 mg/dL    Sodium 137 136 - 145 mmol/L    Potassium 3.4 (L) 3.5 - 5.1 mmol/L    Chloride 106 98 - 112 mmol/L    CO2 22.0 21.0 - 32.0 mmol/L    Anion Gap 9 0 - 18 mmol/L    BUN 15 7 - 18 mg/dL    Creatinine 1.38 (H) 0.55 - 1.02 mg/dL    Calcium, Total 9.7 8.5 - 10.1 mg/dL    Calculated Osmolality 285 275 - 295 mOsm/kg    eGFR-Cr 45 (L) >=60 mL/min/1.73m2    AST 17 15 - 37 U/L    ALT 25 13 - 56 U/L    Alkaline Phosphatase 133 (H) 46 - 118 U/L    Bilirubin, Total 0.4 0.1 - 2.0 mg/dL    Total Protein 8.9 (H) 6.4 - 8.2 g/dL    Albumin 3.7 3.4 - 5.0 g/dL    Globulin  5.2 (H) 2.8 - 4.4 g/dL    A/G Ratio 0.7 (L) 1.0 - 2.0    Patient Fasting for CMP? No      *Note: Due to a large number of results and/or encounters for the requested time period, some results have not been displayed. A complete set of results can be found in Results Review.                Passed - In person appointment or virtual visit in the past 6 months     Recent Outpatient Visits              1 week ago Chronic post-traumatic headache, not intractable    [facility], [address] [facility], Rayne Smith MD    Office Visit    1 month ago Annual physical exam    [address], [address], Cheryl Farrell MD    Office Visit    3 months ago Stage 3a chronic kidney disease [facility])    [address], [address] [facility] [place], MD    Office Visit    3 months ago Stage 3b chronic kidney disease [facility])    [place] [address] [place], Ravi Khan MD    Office Visit    4 months ago Elevated total protein    [facility] Hematology Oncology Dennie Back, MD    Office Visit          Future Appointments         Provider Department Appt Notes    In 3 days [address] [facility] Nuclear Medicine     In 3 days [address] ([place] [address]) Nuclear Medicine     In 1 week [facility] [facility] What is going on    In 4 months Rachele Odonnell MD [facility] Hematology Oncology 5 M f/u w/ outpt lab

## 2023-11-03 ENCOUNTER — HOSPITAL ENCOUNTER (OUTPATIENT)
Dept: NUCLEAR MEDICINE | Facility: HOSPITAL | Age: 58
Discharge: HOME | End: 2023-11-03
Attending: INTERNAL MEDICINE
Payer: MEDICAID

## 2023-11-03 DIAGNOSIS — R74.8 ALKALINE PHOSPHATASE ELEVATION: ICD-10-CM

## 2023-11-03 PROCEDURE — 78306 BONE IMAGING WHOLE BODY: CPT | Performed by: INTERNAL MEDICINE

## 2023-11-09 ENCOUNTER — HOSPITAL ENCOUNTER (OUTPATIENT)
Dept: BONE DENSITY | Facility: HOSPITAL | Age: 58
Discharge: HOME | End: 2023-11-09
Attending: INTERNAL MEDICINE
Payer: MEDICAID

## 2023-11-09 DIAGNOSIS — E28.39 ESTROGEN DEFICIENCY: ICD-10-CM

## 2023-11-09 PROCEDURE — 77080 DXA BONE DENSITY AXIAL: CPT | Performed by: INTERNAL MEDICINE

## 2023-11-12 DIAGNOSIS — G44.329 CHRONIC POST-TRAUMATIC HEADACHE, NOT INTRACTABLE: ICD-10-CM

## 2023-11-12 RX ORDER — BUTALBITAL, ACETAMINOPHEN AND CAFFEINE 50; 325; 40 MG/1; MG/1; MG/1
1 TABLET ORAL 2 TIMES DAILY PRN
Qty: 60 TABLET | Refills: 1 | Status: CANCELLED | OUTPATIENT
Start: 2023-11-12

## 2023-11-13 NOTE — TELEPHONE ENCOUNTER
Requested Prescriptions     Pending Prescriptions Disp Refills    BUTALBITAL-ACETAMINOPHEN-CAFFEINE -40 MG Oral Tab [Pharmacy Med Name: Butalbital-APAP-Caffeine -40 MG Oral Tablet] 60 tablet 0     Sig: Take 1 tablet by mouth twice daily as needed        LOV: 10/24/23  NOV: none    Last [place]: 10/14/23 QTY 60

## 2023-11-14 RX ORDER — BUTALBITAL, ACETAMINOPHEN AND CAFFEINE 50; 325; 40 MG/1; MG/1; MG/1
1 TABLET ORAL 2 TIMES DAILY PRN
Qty: 60 TABLET | Refills: 0 | Status: SHIPPED | OUTPATIENT
Start: 2023-11-14

## 2023-12-08 DIAGNOSIS — G44.329 CHRONIC POST-TRAUMATIC HEADACHE, NOT INTRACTABLE: ICD-10-CM

## 2023-12-11 RX ORDER — BUTALBITAL, ACETAMINOPHEN AND CAFFEINE 50; 325; 40 MG/1; MG/1; MG/1
1 TABLET ORAL 2 TIMES DAILY PRN
Qty: 60 TABLET | Refills: 1 | Status: SHIPPED | OUTPATIENT
Start: 2023-12-11

## 2023-12-11 NOTE — TELEPHONE ENCOUNTER
Requested Prescriptions     Pending Prescriptions Disp Refills    butalbital-acetaminophen-caffeine -40 MG Oral Tab 60 tablet 0     Sig: Take 1 tablet by mouth 2 (two) times daily as needed.        Last OV: 10/24/23  Next OV: None  Last refilled: 11/14/23 #60

## 2024-02-06 DIAGNOSIS — M62.838 MUSCLE SPASM: ICD-10-CM

## 2024-02-06 DIAGNOSIS — G44.329 CHRONIC POST-TRAUMATIC HEADACHE, NOT INTRACTABLE: ICD-10-CM

## 2024-02-06 DIAGNOSIS — F41.9 ANXIETY: ICD-10-CM

## 2024-02-06 NOTE — TELEPHONE ENCOUNTER
diazePAM 5 MG Oral Tab 60 tablet 2 10/24/2023 --   Sig:   Take 1 tablet (5 mg total) by mouth every 12 (twelve) hours as needed for Anxiety (spasm).     Route:   Oral           Medication Quantity Refills Start End   butalbital-acetaminophen-caffeine -40 MG Oral Tab 60 tablet 1 12/11/2023 --   Sig:   Take 1 tablet by mouth 2 (two) times daily as needed.     Route:   Oral     Order #:   [id number]         Please review and sign if appropriate.          LOV:10/24/2023  NOV: none    Last [place]ill/[place]: 10/24/2023(#60 /2 refills)      LOV:10/24/2023  NOV: none    Last refill/[place]: 12/11/2023 (#60 /1 refill)

## 2024-02-07 RX ORDER — DIAZEPAM 5 MG/1
5 TABLET ORAL EVERY 12 HOURS PRN
Qty: 60 TABLET | Refills: 2 | Status: SHIPPED | OUTPATIENT
Start: 2024-02-07

## 2024-02-07 RX ORDER — BUTALBITAL, ACETAMINOPHEN AND CAFFEINE 50; 325; 40 MG/1; MG/1; MG/1
1 TABLET ORAL 2 TIMES DAILY PRN
Qty: 60 TABLET | Refills: 1 | Status: SHIPPED | OUTPATIENT
Start: 2024-02-07

## 2024-02-08 RX ORDER — LOVASTATIN 40 MG/1
40 TABLET ORAL NIGHTLY
Qty: 90 TABLET | Refills: 3 | Status: SHIPPED | OUTPATIENT
Start: 2024-02-08

## 2024-02-08 NOTE — TELEPHONE ENCOUNTER
Refill passed per [facility] protocol.  Requested Prescriptions   Pending Prescriptions Disp Refills    LOVASTATIN 40 MG Oral Tab [Pharmacy Med Name: Lovastatin 40 MG Oral Tablet] 90 tablet 0     Sig: Take 1 tablet by mouth nightly       Cholesterol Medication Protocol Passed - 2/6/2024  3:23 PM        Passed - ALT < 80     Lab Results   Component Value Date    ALT 25 09/08/2023             Passed - ALT resulted within past year        Passed - Lipid panel within past 12 months     Lab Results   Component Value Date    CHOLEST 268 (H) 09/08/2023    TRIG 142 09/08/2023    HDL 43 09/08/2023     (H) 09/08/2023    VLDL 30 09/08/2023    NONHDLC 225 (H) 09/08/2023             Passed - In person appointment or virtual visit in the past 12 mos or appointment in next 3 mos     Recent Outpatient Visits              3 months ago Chronic post-traumatic headache, not intractable    [facility] [place] Belia Cleaning MD    Office Visit    5 months ago Annual physical exam    [facility] Krish Andersen MD    Office Visit    6 months ago Stage 3a chronic kidney disease (HCC)    [facility] [place] Ajit [place] MD    Office Visit    7 months ago Stage 3b chronic kidney disease (HCC)    [facility], Ajit Nieves MD    Office Visit    8 months ago Elevated total protein    [facility] Hematology Oncology Mayito Odonnell MD    Office Visit          Future Appointments         Provider Department Appt Notes    In 4 weeks Mayito Odonnell MD [facility] Hematology Oncology 9 M f/u w/ outpt lab                  Recent Outpatient Visits              3 months ago Chronic post-traumatic headache, not intractable    [facility] [place] Belia Cleaning MD    Office Visit    5 months ago Annual physical exam     [facility] Krish Little MD    Office Visit    6 months ago Stage 3a chronic kidney disease (HCC)    [facility], Ajit Nieves MD    Office Visit    7 months ago Stage 3b chronic kidney disease (HCC)    [facility], Ajit Nieves MD    Office Visit    8 months ago Elevated total protein    [facility] Hematology Oncology Mayito Odonnell MD    Office Visit          Future Appointments         Provider Department Appt Notes    In 4 weeks Mayito Odonnell MD [facility] Hematology Oncology 9 M f/u w/ outpt lab

## 2024-03-04 DIAGNOSIS — G44.329 CHRONIC POST-TRAUMATIC HEADACHE, NOT INTRACTABLE: ICD-10-CM

## 2024-03-04 RX ORDER — AMITRIPTYLINE HYDROCHLORIDE 75 MG/1
75 TABLET ORAL NIGHTLY
Qty: 30 TABLET | Refills: 5 | Status: SHIPPED | OUTPATIENT
Start: 2024-03-04

## 2024-03-04 NOTE — TELEPHONE ENCOUNTER
Medication Detail    Medication Quantity Refills Start End   amitriptyline 75 MG Oral Tab 30 tablet 5 9/27/2023 --   Sig:   Take 1 tablet (75 mg total) by mouth nightly.     Route:   Oral     Order #:   [id number]           Please review and sign if appropriate.          LOV:10/24/2023  NOV: none    Last refill/[place]: 09/27/2023

## 2024-03-06 RX ORDER — MIRTAZAPINE 15 MG/1
15 TABLET, FILM COATED ORAL NIGHTLY
Qty: 90 TABLET | Refills: 0 | OUTPATIENT
Start: 2024-03-06

## 2024-03-06 RX ORDER — LISINOPRIL 10 MG/1
10 TABLET ORAL 2 TIMES DAILY
Qty: 180 TABLET | Refills: 0 | Status: SHIPPED | OUTPATIENT
Start: 2024-03-06 | End: 2024-06-02

## 2024-03-06 RX ORDER — MIRTAZAPINE 15 MG/1
15 TABLET, FILM COATED ORAL NIGHTLY
Qty: 90 TABLET | Refills: 0 | Status: SHIPPED | OUTPATIENT
Start: 2024-03-06 | End: 2024-06-02

## 2024-03-06 NOTE — TELEPHONE ENCOUNTER
Please review.  Protocol failed / No protocol.   Requested Prescriptions   Pending Prescriptions Disp Refills    lisinopril 10 MG Oral Tab 180 tablet 0     Sig: Take 1 tablet (10 mg total) by mouth 2 (two) times daily.       Hypertension Medications Protocol Failed - 3/4/2024 10:36 AM        Failed - EGFRCR or GFRAA > 50     GFR Evaluation  EGFRCR: 45 , resulted on 9/8/2023          Passed - CMP or BMP in past 12 months        Passed - Last BP reading less than 140/90     BP Readings from Last 1 Encounters:   10/24/23 124/89               Passed - In person appointment or virtual visit in the past 12 mos or appointment in next 3 mos     Recent Outpatient Visits              4 months ago Chronic post-traumatic headache, not intractable    [facility], Belia Mustafa MD    Office Visit    6 months ago Annual physical exam    [facility] Krish Little MD    Office Visit    7 months ago Stage 3a chronic kidney disease (HCC)    [facility], Ajit Nieves MD    Office Visit    8 months ago Stage 3b chronic kidney disease (HCC)    [facility], Ajit Nieves MD    Office Visit    9 months ago Elevated total protein    [facility] Hematology Oncology Mayito Odonnell MD    Office Visit                        mirtazapine 15 MG Oral Tab 90 tablet 1     Sig: Take 1 tablet (15 mg total) by mouth nightly.       There is no refill protocol information for this order         Recent Outpatient Visits              4 months ago Chronic post-traumatic headache, not intractable    [facility], Belia Mustafa MD    Office Visit    6 months ago Annual physical exam    [facility] Krish Little MD    Office Visit    7 months ago Stage 3a chronic kidney disease  (HCC)    [facility] [place], Ajit Nieves MD    Office Visit    8 months ago Stage 3b chronic kidney disease (HCC)    [facility], Ajit Nieves MD    Office Visit    9 months ago Elevated total protein    [facility] Hematology Oncology Mayito Odonnell MD    Office Visit

## 2024-03-07 ENCOUNTER — APPOINTMENT (OUTPATIENT)
Dept: HEMATOLOGY/ONCOLOGY | Facility: HOSPITAL | Age: 59
End: 2024-03-07
Payer: MEDICAID

## 2024-03-19 ENCOUNTER — APPOINTMENT (OUTPATIENT)
Dept: HEMATOLOGY/ONCOLOGY | Facility: HOSPITAL | Age: 59
End: 2024-03-19
Attending: INTERNAL MEDICINE
Payer: MEDICAID

## 2024-03-21 ENCOUNTER — OFFICE VISIT (OUTPATIENT)
Dept: INTERNAL MEDICINE CLINIC | Facility: CLINIC | Age: 59
End: 2024-03-21

## 2024-03-21 ENCOUNTER — TELEPHONE (OUTPATIENT)
Dept: INTERNAL MEDICINE CLINIC | Facility: CLINIC | Age: 59
End: 2024-03-21

## 2024-03-21 ENCOUNTER — LAB ENCOUNTER (OUTPATIENT)
Dept: LAB | Age: 59
End: 2024-03-21
Attending: INTERNAL MEDICINE
Payer: MEDICAID

## 2024-03-21 VITALS
HEIGHT: 64 IN | BODY MASS INDEX: 39.67 KG/M2 | DIASTOLIC BLOOD PRESSURE: 80 MMHG | HEART RATE: 89 BPM | TEMPERATURE: 98 F | SYSTOLIC BLOOD PRESSURE: 128 MMHG | WEIGHT: 232.38 LBS | OXYGEN SATURATION: 97 %

## 2024-03-21 DIAGNOSIS — J01.90 ACUTE NON-RECURRENT SINUSITIS, UNSPECIFIED LOCATION: ICD-10-CM

## 2024-03-21 DIAGNOSIS — F32.A ANXIETY AND DEPRESSION: ICD-10-CM

## 2024-03-21 DIAGNOSIS — R77.8 ELEVATED TOTAL PROTEIN: ICD-10-CM

## 2024-03-21 DIAGNOSIS — I10 PRIMARY HYPERTENSION: ICD-10-CM

## 2024-03-21 DIAGNOSIS — N18.32 STAGE 3B CHRONIC KIDNEY DISEASE (HCC): ICD-10-CM

## 2024-03-21 DIAGNOSIS — E87.6 LOW SERUM POTASSIUM: ICD-10-CM

## 2024-03-21 DIAGNOSIS — R56.9 SEIZURE (HCC): ICD-10-CM

## 2024-03-21 DIAGNOSIS — N18.31 STAGE 3A CHRONIC KIDNEY DISEASE (HCC): ICD-10-CM

## 2024-03-21 DIAGNOSIS — F41.9 ANXIETY AND DEPRESSION: ICD-10-CM

## 2024-03-21 DIAGNOSIS — E78.2 MIXED HYPERLIPIDEMIA: Primary | ICD-10-CM

## 2024-03-21 DIAGNOSIS — E78.2 MIXED HYPERLIPIDEMIA: ICD-10-CM

## 2024-03-21 LAB
ALBUMIN SERPL-MCNC: 4.3 G/DL (ref 3.2–4.8)
ALBUMIN/GLOB SERPL: 1.3 {RATIO} (ref 1–2)
ALP LIVER SERPL-CCNC: 119 U/L
ALT SERPL-CCNC: 12 U/L
ANION GAP SERPL CALC-SCNC: 7 MMOL/L (ref 0–18)
AST SERPL-CCNC: 23 U/L (ref ?–34)
BASOPHILS # BLD AUTO: 0.05 X10(3) UL (ref 0–0.2)
BASOPHILS NFR BLD AUTO: 0.6 %
BILIRUB SERPL-MCNC: 0.3 MG/DL (ref 0.3–1.2)
BUN BLD-MCNC: 10 MG/DL (ref 9–23)
BUN/CREAT SERPL: 9.3 (ref 10–20)
CALCIUM BLD-MCNC: 9.3 MG/DL (ref 8.7–10.4)
CHLORIDE SERPL-SCNC: 111 MMOL/L (ref 98–112)
CHOLEST SERPL-MCNC: 215 MG/DL (ref ?–200)
CO2 SERPL-SCNC: 24 MMOL/L (ref 21–32)
CREAT BLD-MCNC: 1.08 MG/DL
DEPRECATED RDW RBC AUTO: 42.5 FL (ref 35.1–46.3)
EGFRCR SERPLBLD CKD-EPI 2021: 60 ML/MIN/1.73M2 (ref 60–?)
EOSINOPHIL # BLD AUTO: 0.08 X10(3) UL (ref 0–0.7)
EOSINOPHIL NFR BLD AUTO: 1 %
ERYTHROCYTE [DISTWIDTH] IN BLOOD BY AUTOMATED COUNT: 13.8 % (ref 11–15)
FASTING PATIENT LIPID ANSWER: YES
FASTING STATUS PATIENT QL REPORTED: YES
GLOBULIN PLAS-MCNC: 3.3 G/DL (ref 2.8–4.4)
GLUCOSE BLD-MCNC: 94 MG/DL (ref 70–99)
HCT VFR BLD AUTO: 42.3 %
HDLC SERPL-MCNC: 47 MG/DL (ref 40–59)
HGB BLD-MCNC: 14 G/DL
IMM GRANULOCYTES # BLD AUTO: 0.02 X10(3) UL (ref 0–1)
IMM GRANULOCYTES NFR BLD: 0.2 %
LDLC SERPL CALC-MCNC: 150 MG/DL (ref ?–100)
LYMPHOCYTES # BLD AUTO: 3.53 X10(3) UL (ref 1–4)
LYMPHOCYTES NFR BLD AUTO: 42.9 %
MCH RBC QN AUTO: 28 PG (ref 26–34)
MCHC RBC AUTO-ENTMCNC: 33.1 G/DL (ref 31–37)
MCV RBC AUTO: 84.6 FL
MONOCYTES # BLD AUTO: 0.34 X10(3) UL (ref 0.1–1)
MONOCYTES NFR BLD AUTO: 4.1 %
NEUTROPHILS # BLD AUTO: 4.21 X10 (3) UL (ref 1.5–7.7)
NEUTROPHILS # BLD AUTO: 4.21 X10(3) UL (ref 1.5–7.7)
NEUTROPHILS NFR BLD AUTO: 51.2 %
NONHDLC SERPL-MCNC: 168 MG/DL (ref ?–130)
OSMOLALITY SERPL CALC.SUM OF ELEC: 293 MOSM/KG (ref 275–295)
PHOSPHATE SERPL-MCNC: 3.4 MG/DL (ref 2.4–5.1)
PLATELET # BLD AUTO: 310 10(3)UL (ref 150–450)
POTASSIUM SERPL-SCNC: 3.9 MMOL/L (ref 3.5–5.1)
PROT SERPL-MCNC: 7.6 G/DL (ref 5.7–8.2)
PROT UR-MCNC: 6.9 MG/DL (ref ?–14)
RBC # BLD AUTO: 5 X10(6)UL
SODIUM SERPL-SCNC: 142 MMOL/L (ref 136–145)
TRIGL SERPL-MCNC: 98 MG/DL (ref 30–149)
VLDLC SERPL CALC-MCNC: 19 MG/DL (ref 0–30)
WBC # BLD AUTO: 8.2 X10(3) UL (ref 4–11)

## 2024-03-21 PROCEDURE — 86335 IMMUNFIX E-PHORSIS/URINE/CSF: CPT

## 2024-03-21 PROCEDURE — 99214 OFFICE O/P EST MOD 30 MIN: CPT | Performed by: INTERNAL MEDICINE

## 2024-03-21 PROCEDURE — 83521 IG LIGHT CHAINS FREE EACH: CPT

## 2024-03-21 PROCEDURE — 80061 LIPID PANEL: CPT

## 2024-03-21 PROCEDURE — 84166 PROTEIN E-PHORESIS/URINE/CSF: CPT

## 2024-03-21 PROCEDURE — 85025 COMPLETE CBC W/AUTO DIFF WBC: CPT

## 2024-03-21 PROCEDURE — 84156 ASSAY OF PROTEIN URINE: CPT

## 2024-03-21 PROCEDURE — 86334 IMMUNOFIX E-PHORESIS SERUM: CPT

## 2024-03-21 PROCEDURE — 36415 COLL VENOUS BLD VENIPUNCTURE: CPT

## 2024-03-21 PROCEDURE — 84100 ASSAY OF PHOSPHORUS: CPT

## 2024-03-21 PROCEDURE — 84165 PROTEIN E-PHORESIS SERUM: CPT

## 2024-03-21 PROCEDURE — 80053 COMPREHEN METABOLIC PANEL: CPT

## 2024-03-21 RX ORDER — FLUTICASONE PROPIONATE 50 MCG
2 SPRAY, SUSPENSION (ML) NASAL DAILY
Qty: 1 EACH | Refills: 2 | Status: SHIPPED | OUTPATIENT
Start: 2024-03-21

## 2024-03-21 RX ORDER — DOXYCYCLINE 100 MG/1
100 TABLET ORAL 2 TIMES DAILY
Qty: 14 TABLET | Refills: 0 | Status: SHIPPED | OUTPATIENT
Start: 2024-03-21

## 2024-03-21 NOTE — PROGRESS NOTES
Subjective:     Patient ID: Lauren Weston is a 58 year old female.    Sinus Problem  This is a new problem. The current episode started in the past 7 days. The problem is unchanged. There has been no fever. The pain is mild. Associated symptoms include congestion, sinus pressure and a sore throat. Pertinent negatives include no ear pain or shortness of breath. (Green nasal discharge) Past treatments include oral decongestants. The treatment provided no relief.   Hypertension  This is a chronic problem. The current episode started more than 1 year ago. The problem has been gradually improving since onset. The problem is controlled. Pertinent negatives include no chest pain, peripheral edema or shortness of breath. There are no associated agents to hypertension. Risk factors for coronary artery disease include obesity, dyslipidemia and smoking/tobacco exposure. Past treatments include ACE inhibitors and calcium channel blockers. The current treatment provides significant improvement. There are no compliance problems.  There is no history of angina, kidney disease, CVA, heart failure or PVD. There is no history of chronic renal disease or a hypertension causing med.   Hyperlipidemia  This is a chronic problem. The current episode started more than 1 year ago. The problem is controlled. Recent lipid tests were reviewed and are variable. Exacerbating diseases include obesity. She has no history of chronic renal disease, diabetes, hypothyroidism, liver disease or nephrotic syndrome. Pertinent negatives include no chest pain or shortness of breath. Current antihyperlipidemic treatment includes statins, diet change and exercise. The current treatment provides significant improvement of lipids. Compliance problems include medication cost.  Risk factors for coronary artery disease include dyslipidemia, hypertension, post-menopausal and a sedentary lifestyle.       History/Other:   Review of Systems   Constitutional:  Negative.    HENT:  Positive for congestion, postnasal drip, rhinorrhea, sinus pressure, sinus pain and sore throat. Negative for ear discharge, ear pain, facial swelling, trouble swallowing and voice change.    Eyes: Negative.    Respiratory:  Negative for shortness of breath.    Cardiovascular: Negative.  Negative for chest pain.   Gastrointestinal: Negative.    Genitourinary: Negative.      Current Outpatient Medications   Medication Sig Dispense Refill    lisinopril 10 MG Oral Tab Take 1 tablet (10 mg total) by mouth 2 (two) times daily. 180 tablet 0    mirtazapine 15 MG Oral Tab Take 1 tablet (15 mg total) by mouth nightly. 90 tablet 0    amitriptyline 75 MG Oral Tab Take 1 tablet (75 mg total) by mouth nightly. 30 tablet 5    Lovastatin 40 MG Oral Tab Take 1 tablet (40 mg total) by mouth nightly. 90 tablet 3    diazePAM 5 MG Oral Tab Take 1 tablet (5 mg total) by mouth every 12 (twelve) hours as needed for Anxiety (spasm). 60 tablet 2    butalbital-acetaminophen-caffeine -40 MG Oral Tab Take 1 tablet by mouth 2 (two) times daily as needed. 60 tablet 1    amLODIPine 10 MG Oral Tab Take 1 tablet (10 mg total) by mouth nightly. 90 tablet 1    gabapentin 300 MG Oral Cap Take 1 capsule (300 mg total) by mouth 4 (four) times daily. 360 capsule 3    Albuterol Sulfate  (90 Base) MCG/ACT Inhalation Aero Soln Inhale 2 puffs into the lungs every 6 (six) hours as needed for Wheezing. 1 each 0    Doxycycline Monohydrate 100 MG Oral Tab Take 100 mg by mouth 2 (two) times daily. (Patient not taking: Reported on 3/21/2024) 14 tablet 0     Allergies:  Allergies   Allergen Reactions    Baby Oil RASH and RESPIRATORY FAILURE    Clemizole ANAPHYLAXIS    [place] RESPIRATORY FAILURE    Orange Juice [Orange Oil] RESPIRATORY FAILURE    Banana NAUSEA AND VOMITING    Penicillins ANAPHYLAXIS       Past Medical History:   Diagnosis Date    Back pain     Heart murmur     High blood pressure     Hx of head injury 2007    Hx  of migraine headaches     Hyperlipidemia     Seizure disorder (HCC)     Brain spasms    Spasm     Chronic nerve spasms    TBI (traumatic brain injury) (HCC)     Unspecified essential hypertension     Visual impairment     glasses      Past Surgical History:   Procedure Laterality Date    CHOLECYSTECTOMY  1999    COLONOSCOPY N/A 08/17/2017    Procedure: COLONOSCOPY;  Surgeon: Duane Almazan MD;  Location: [facility] ENDOSCOPY    ELECTROCARDIOGRAM, COMPLETE  04/16/2012    SCANNED TO MEDIA TAB: 04-    HYSTERECTOMY  2007    partial    EUNICE BIOPSY STEREO NODULE 1 SITE RIGHT (CPT=19081) Right 04/2022    ADH    EUNICE LOCALIZATION WIRE 1 SITE RIGHT (CPT=19281) Right 06/2022    Usual ductal hyperplasia    OTHER Left     rempved mass on hip    TUBAL LIGATION Bilateral 1990      Family History   Problem Relation Age of Onset    Heart Disorder Father         Bypass x 3, heroin addict, kidney stones    No Known Problems Mother     No Known Problems Sister       Social History:   Social History     Socioeconomic History    Marital status:     Number of children: 2   Occupational History    Occupation: Unemployed   Tobacco Use    Smoking status: Every Day     Packs/day: 0.25     Years: 21.00     Additional pack years: 0.00     Total pack years: 5.25     Types: Cigarettes     Passive exposure: Current    Smokeless tobacco: Never    Tobacco comments:     smokes only 3-4 sticks /day   Vaping Use    Vaping Use: Never used   Substance and Sexual Activity    Alcohol use: No     Alcohol/week: 0.0 standard drinks of alcohol    Drug use: Yes     Frequency: 7.0 times per week     Types: Cannabis     Comment: daily    Other Topics Concern    Caffeine Concern No    Exercise No     Comment: Daily   Social History Narrative    The patient does not use an assistive device..      The patient does not live in a home with stairs.        Objective:   Physical Exam  Constitutional:       General: She is not in acute distress.     Appearance:  She is well-developed. She is obese. She is not ill-appearing, toxic-appearing or diaphoretic.   HENT:      Head: Normocephalic and atraumatic.      Right Ear: Tympanic membrane, ear canal and external ear normal.      Left Ear: Tympanic membrane, ear canal and external ear normal.      Nose: No nasal deformity.      Right Nostril: No foreign body, epistaxis or septal hematoma.      Left Nostril: No foreign body, epistaxis or septal hematoma.      Right Turbinates: Enlarged.      Left Turbinates: Enlarged.      Right Sinus: Maxillary sinus tenderness and frontal sinus tenderness present.      Left Sinus: Maxillary sinus tenderness and frontal sinus tenderness present.      Mouth/Throat:      Pharynx: No oropharyngeal exudate.   Eyes:      General: No scleral icterus.        Right eye: No discharge.         Left eye: No discharge.      Conjunctiva/sclera: Conjunctivae normal.      Pupils: Pupils are equal, round, and reactive to light.   Neck:      Vascular: No JVD.   Cardiovascular:      Rate and Rhythm: Normal rate and regular rhythm.      Heart sounds: Normal heart sounds.   Pulmonary:      Effort: Pulmonary effort is normal. No respiratory distress.      Breath sounds: Normal breath sounds. No wheezing or rales.   Abdominal:      General: Bowel sounds are normal. There is no distension.      Palpations: Abdomen is soft. There is no mass.      Tenderness: There is no abdominal tenderness. There is no guarding or rebound.   Musculoskeletal:         General: No tenderness. Normal range of motion.      Cervical back: Normal range of motion and neck supple.   Lymphadenopathy:      Cervical: No cervical adenopathy.   Skin:     General: Skin is warm and dry.      Findings: No rash.   Neurological:      Mental Status: She is alert and oriented to person, place, and time.         Assessment & Plan:   (E78.2) Mixed hyperlipidemia  (primary encounter diagnosis)  Plan: check lipid panel; continue with chol med and low chol  diet.     (I10) Primary hypertension  Plan: bp controlled. Cpm.     (N18.31) Stage 3a chronic kidney disease (HCC)  Plan: check renal panel ; avoid nsaids.     (F41.9,  F32.A) Anxiety and depression  Plan: pt continues to ff with her psychiatrist.     (R56.9) Seizure (HCC)  Plan: pt ff and managed by neuro.     (J01.90) Acute non-recurrent sinusitis, unspecified location  Plan: pt given doxycycline and flonase NS. Call backif symptoms persist/worsens .       No orders of the defined types were placed in this encounter.      Meds This Visit:  Requested Prescriptions      No prescriptions requested or ordered in this encounter       Imaging & Referrals:  None

## 2024-03-23 ENCOUNTER — TELEPHONE (OUTPATIENT)
Dept: NEPHROLOGY | Facility: CLINIC | Age: 59
End: 2024-03-23

## 2024-03-23 NOTE — TELEPHONE ENCOUNTER
Your kidney function was a little better compared to 6 months ago but please see me soon for routine follow-up.

## 2024-03-25 LAB
ALBUMIN SERPL ELPH-MCNC: 3.66 G/DL (ref 3.75–5.21)
ALBUMIN/GLOB SERPL: 1.07 {RATIO} (ref 1–2)
ALPHA1 GLOB SERPL ELPH-MCNC: 0.4 G/DL (ref 0.19–0.46)
ALPHA2 GLOB SERPL ELPH-MCNC: 1.04 G/DL (ref 0.48–1.05)
B-GLOBULIN SERPL ELPH-MCNC: 0.87 G/DL (ref 0.68–1.23)
GAMMA GLOB SERPL ELPH-MCNC: 1.12 G/DL (ref 0.62–1.7)
KAPPA LC FREE SER-MCNC: 3.15 MG/DL (ref 0.33–1.94)
KAPPA LC FREE/LAMBDA FREE SER NEPH: 1.92 {RATIO} (ref 0.26–1.65)
LAMBDA LC FREE SERPL-MCNC: 1.64 MG/DL (ref 0.57–2.63)
PROT SERPL-MCNC: 7.1 G/DL (ref 5.7–8.2)

## 2024-03-25 NOTE — TELEPHONE ENCOUNTER
Informed patient of note below ( verified) and verbalized understanding ,offered to schedule but but will do it thru Mychart.

## 2024-03-29 ENCOUNTER — TELEPHONE (OUTPATIENT)
Dept: NEPHROLOGY | Facility: CLINIC | Age: 59
End: 2024-03-29

## 2024-03-29 NOTE — TELEPHONE ENCOUNTER
----- Message from Ajit West MD sent at 3/29/2024  3:54 PM CDT -----  Please call patient regarding test results.  Not reading [facility]

## 2024-03-29 NOTE — TELEPHONE ENCOUNTER
our kidney function was a little better compared to 6 months ago but please see me soon for routine follow-up.   Written by Ajit West MD on 3/23/2024 12:05 PM CDT

## 2024-04-03 DIAGNOSIS — G44.329 CHRONIC POST-TRAUMATIC HEADACHE, NOT INTRACTABLE: ICD-10-CM

## 2024-04-04 RX ORDER — BUTALBITAL, ACETAMINOPHEN AND CAFFEINE 50; 325; 40 MG/1; MG/1; MG/1
1 TABLET ORAL 2 TIMES DAILY PRN
Qty: 60 TABLET | Refills: 0 | Status: SHIPPED | OUTPATIENT
Start: 2024-04-04

## 2024-04-04 NOTE — TELEPHONE ENCOUNTER
Requested Prescriptions     Pending Prescriptions Disp Refills    butalbital-acetaminophen-caffeine -40 MG Oral Tab 60 tablet 1     Sig: Take 1 tablet by mouth 2 (two) times daily as needed.       Last OV: 10/24/23  Next OV: None  Last refilled: 2/7/24

## 2024-04-05 RX ORDER — GABAPENTIN 300 MG/1
300 CAPSULE ORAL 4 TIMES DAILY
Qty: 360 CAPSULE | Refills: 3 | Status: SHIPPED | OUTPATIENT
Start: 2024-04-05

## 2024-04-05 NOTE — TELEPHONE ENCOUNTER
Refill passed per [facility] protocol.    Requested Prescriptions   Pending Prescriptions Disp Refills    gabapentin 300 MG Oral Cap 360 capsule 3     Sig: Take 1 capsule (300 mg total) by mouth 4 (four) times daily.       Neurology Medications Passed - 4/3/2024  5:39 PM        Passed - In person appointment or virtual visit in the past 6 mos or appointment in next 3 mos     Recent Outpatient Visits              2 weeks ago Mixed hyperlipidemia    [facility] Krish Little MD    Office Visit    5 months ago Chronic post-traumatic headache, not intractable    [facility] Belia Cleaning MD    Office Visit    7 months ago Annual physical exam    [facility] Krish Little MD    Office Visit    8 months ago Stage 3a chronic kidney disease (HCC)    [facility] Ajit West MD    Office Visit    9 months ago Stage 3b chronic kidney disease (HCC)    [facility] Ajit West MD    Office Visit          Future Appointments         Provider Department Appt Notes    In 1 month Ajit West MD [facility] follow up    In 1 month Debi Servin MD [facility] Follow up                  Future Appointments         Provider Department Appt Notes    In 1 month Ajit West MD [facility] follow up    In 1 month [facility] Servin MD [facility]          Recent Outpatient Visits              2 weeks ago Mixed hyperlipidemia    [facility] Krish Little MD    Office Visit    5 months ago Chronic post-traumatic headache, not intractable    [place]  [facility], [facility], [place] Belia Cleaning MD    Office Visit    7 months ago Annual physical exam    [facility], [address], Krish Andersen MD    Office Visit    8 months ago Stage 3a chronic kidney disease (HCC)    [facility], [facility], Ajit Nieves MD    Office Visit    9 months ago Stage 3b chronic kidney disease (HCC)    [facility], [facility], Ajit Nieves MD    Office Visit

## 2024-04-19 ENCOUNTER — OFFICE VISIT (OUTPATIENT)
Dept: NEUROLOGY | Facility: CLINIC | Age: 59
End: 2024-04-19
Payer: MEDICAID

## 2024-04-19 VITALS
SYSTOLIC BLOOD PRESSURE: 112 MMHG | HEART RATE: 101 BPM | HEIGHT: 64 IN | RESPIRATION RATE: 16 BRPM | BODY MASS INDEX: 40.12 KG/M2 | DIASTOLIC BLOOD PRESSURE: 82 MMHG | WEIGHT: 235 LBS

## 2024-04-19 DIAGNOSIS — G44.329 CHRONIC POST-TRAUMATIC HEADACHE, NOT INTRACTABLE: Primary | ICD-10-CM

## 2024-04-19 PROCEDURE — 99213 OFFICE O/P EST LOW 20 MIN: CPT | Performed by: OTHER

## 2024-04-19 RX ORDER — AMITRIPTYLINE HYDROCHLORIDE 100 MG/1
100 TABLET ORAL NIGHTLY
Qty: 30 TABLET | Refills: 5 | Status: SHIPPED | OUTPATIENT
Start: 2024-04-19
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
## 2024-04-19 NOTE — PATIENT INSTRUCTIONS
Increase dose of Amitriptyline to 100 mg for stress/tension headache and sleep                      Refill policies:    Allow 2-3 business days for refills; controlled substances may take longer.  Contact your pharmacy at least 5 days prior to running out of medication and have them send an electronic request or submit request through the “request refill” option in your [employer] account.  Refills are not addressed on weekends; covering physicians do not authorize routine medications on weekends.  No narcotics or controlled substances are refilled after noon on Fridays or by on call physicians.  By law, narcotics must be electronically prescribed.  A 30 day supply with no refills is the maximum allowed.  If your prescription is due for a refill, you may be due for a follow up appointment.  To best provide you care, patients receiving routine medications need to be seen at least once a year.  Patients receiving narcotic/controlled substance medications need to be seen at least once every 3 months.  In the event that your preferred pharmacy does not have the requested medication in stock (e.g. Backordered), it is your responsibility to find another pharmacy that has the requested medication available.  We will gladly send a new prescription to that pharmacy at your request.    Scheduling Tests:    If your physician has ordered radiology tests such as MRI or CT scans, please contact Central Scheduling at [phone] right away to schedule the test.  Once scheduled, the [facility] Centralized Referral Team will work with your insurance carrier to obtain pre-certification or prior authorization.  Depending on your insurance carrier, approval may take 3-10 days.  It is highly recommended patients assure they have received an authorization before having a test performed.  If test is done without insurance authorization, patient may be responsible for the entire amount billed.      Precertification and Prior  Authorizations:  If your physician has recommended that you have a procedure or additional testing performed the [facility] Centralized Referral Team will contact your insurance carrier to obtain pre-certification or prior authorization.    You are strongly encouraged to contact your insurance carrier to verify that your procedure/test has been approved and is a COVERED benefit.  Although the [facility] Centralized Referral Team does its due diligence, the insurance carrier gives the disclaimer that \"Although the procedure is authorized, this does not guarantee payment.\"    Ultimately the patient is responsible for payment.   Thank you for your understanding in this matter.  Paperwork Completion:  If you require FMLA or disability paperwork for your recovery, please make sure to either drop it off or have it faxed to our office at [phone]. Be sure the form has your name and date of birth on it.  The form will be faxed to our Forms Department and they will complete it for you.  There is a 25$ fee for all forms that need to be filled out.  Please be aware there is a 10-14 day turnaround time.  You will need to sign a release of information (RORY) form if your paperwork does not come with one.  You may call the Forms Department with any questions at [phone].  Their fax number is [phone].

## 2024-05-02 RX ORDER — AMLODIPINE BESYLATE 10 MG/1
10 TABLET ORAL NIGHTLY
Qty: 90 TABLET | Refills: 3 | Status: SHIPPED | OUTPATIENT
Start: 2024-05-02

## 2024-05-02 NOTE — TELEPHONE ENCOUNTER
Refill passed per [facility] protocol.    Requested Prescriptions   Pending Prescriptions Disp Refills    amLODIPine 10 MG Oral Tab 90 tablet 1     Sig: Take 1 tablet (10 mg total) by mouth nightly.       Hypertension Medications Protocol Passed - 5/1/2024  9:51 PM        Passed - CMP or BMP in past 12 months        Passed - Last BP reading less than 140/90     BP Readings from Last 1 Encounters:   04/19/24 112/82               Passed - In person appointment or virtual visit in the past 12 mos or appointment in next 3 mos     Recent Outpatient Visits              1 week ago Chronic post-traumatic headache, not intractable    [facility] Belia Gutierrez MD    Office Visit    1 month ago Mixed hyperlipidemia    [facility] [place] Krish Little MD    Office Visit    6 months ago Chronic post-traumatic headache, not intractable    [facility] Belia Cleaning MD    Office Visit    7 months ago Annual physical exam    [facility], Krish Andersen MD    Office Visit    9 months ago Stage 3a chronic kidney disease (HCC)    [facility] Ajit West MD    Office Visit          Future Appointments         Provider Department Appt Notes    In 1 week Ajit West MD [facility] Follow up    In 2 weeks Debi Servin MD [facility] Follow up                    Passed - EGFRCR or GFRAA > 50     GFR Evaluation  EGFRCR: 60 , resulted on 3/21/2024               Recent Outpatient Visits              1 week ago Chronic post-traumatic headache, not intractable    [facility] Belia Gutierrez MD    Office Visit    1 month ago Mixed hyperlipidemia    [facility] [place] Krish Little MD    Office Visit    6 months ago Chronic post-traumatic headache, not intractable    [facility] Belia Cleaning MD    Office Visit    7 months ago Annual physical exam    [facility] [address], [place] Krish Little MD    Office Visit    9 months ago Stage 3a chronic kidney disease (HCC)    [facility] Ajit West MD    Office Visit            Future Appointments         Provider Department Appt Notes    In 1 week Ajit West MD [facility] Follow up    In 2 weeks Debi Servin MD [facility] Follow up

## 2024-05-08 DIAGNOSIS — F41.9 ANXIETY: ICD-10-CM

## 2024-05-08 DIAGNOSIS — M62.838 MUSCLE SPASM: ICD-10-CM

## 2024-05-08 DIAGNOSIS — G44.329 CHRONIC POST-TRAUMATIC HEADACHE, NOT INTRACTABLE: ICD-10-CM

## 2024-05-10 DIAGNOSIS — G44.329 CHRONIC POST-TRAUMATIC HEADACHE, NOT INTRACTABLE: ICD-10-CM

## 2024-05-10 DIAGNOSIS — M62.838 MUSCLE SPASM: ICD-10-CM

## 2024-05-10 DIAGNOSIS — F41.9 ANXIETY: ICD-10-CM

## 2024-05-10 RX ORDER — BUTALBITAL, ACETAMINOPHEN AND CAFFEINE 50; 325; 40 MG/1; MG/1; MG/1
1 TABLET ORAL 2 TIMES DAILY PRN
Qty: 60 TABLET | Refills: 0 | Status: SHIPPED | OUTPATIENT
Start: 2024-05-10

## 2024-05-10 RX ORDER — DIAZEPAM 5 MG/1
5 TABLET ORAL EVERY 12 HOURS PRN
Qty: 60 TABLET | Refills: 2 | Status: SHIPPED | OUTPATIENT
Start: 2024-05-10

## 2024-05-10 NOTE — TELEPHONE ENCOUNTER
Medication:  diazePAM 5 MG Oral Tab + butalbital-acetaminophen-caffeine -40 MG Oral Tab     Date of last refill: 02/07/2024 (#60/2) + 04/04/2024 (#60/0)  [place]     Last office visit: 04/19/2024  Due back to clinic per last office note:  Around 10/19/2024  Date next office visit scheduled:    Future Appointments   Date Time Provider Department Center   5/15/2024  2:00 PM Ajit West MD [id number] [facility]   5/20/2024  2:15 PM Debi Servin MD [facility]           Last OV note recommendation:    ASSESSMENT/PLAN:      (G44.329) Chronic post-traumatic headache, not intractable        History of chronic post traumatic headache and now superimposed stress headaches and insomnia     1. Increase dose of Amitriptyline to 100 mg daily (aware of potential interaction with Mirtazipine and tolerating fine)  2. Continue Fioricet prn ( limit 2-3 per week)  3. On Valium chronically for spasms and anxiety- unable to wean off     Advise to see a therapist for stress management         RTC in about 6-9 months     See orders and medications filed with this encounter. The patient indicates understanding of these issues and agrees with the plan.

## 2024-05-13 RX ORDER — BUTALBITAL, ACETAMINOPHEN AND CAFFEINE 50; 325; 40 MG/1; MG/1; MG/1
1 TABLET ORAL 2 TIMES DAILY PRN
Qty: 60 TABLET | Refills: 0 | OUTPATIENT
Start: 2024-05-13

## 2024-05-13 RX ORDER — DIAZEPAM 5 MG/1
5 TABLET ORAL EVERY 12 HOURS PRN
Qty: 60 TABLET | Refills: 0 | OUTPATIENT
Start: 2024-05-13

## 2024-05-13 NOTE — TELEPHONE ENCOUNTER
Refused. Duplicate.       Medication: DIAZEPAM 5 MG Oral Tab + BUTALBITAL-ACETAMINOPHEN-CAFFEINE -40 MG Oral Tab     Date of last refill: 05/10/2024 (#60/2) + 05/10/2024 (#60/0)  [place]     Last office visit: 04/19/2024  Due back to clinic per last office note:  Around 10/19/2024  Date next office visit scheduled:    Future Appointments   Date Time Provider Department Center   6/14/2024  2:15 PM Debi Servin MD [facility]   6/18/2024  1:00 PM Ajit West MD [id number]  [facility]           Last OV note recommendation:    ASSESSMENT/PLAN:      (G44.329) Chronic post-traumatic headache, not intractable        History of chronic post traumatic headache and now superimposed stress headaches and insomnia     1. Increase dose of Amitriptyline to 100 mg daily (aware of potential interaction with Mirtazipine and tolerating fine)  2. Continue Fioricet prn ( limit 2-3 per week)  3. On Valium chronically for spasms and anxiety- unable to wean off     Advise to see a therapist for stress management         RTC in about 6-9 months     See orders and medications filed with this encounter. The patient indicates understanding of these issues and agrees with the plan.

## 2024-06-02 DIAGNOSIS — G44.329 CHRONIC POST-TRAUMATIC HEADACHE, NOT INTRACTABLE: ICD-10-CM

## 2024-06-03 RX ORDER — BUTALBITAL, ACETAMINOPHEN AND CAFFEINE 50; 325; 40 MG/1; MG/1; MG/1
1 TABLET ORAL 2 TIMES DAILY PRN
Qty: 60 TABLET | Refills: 0 | Status: SHIPPED | OUTPATIENT
Start: 2024-06-03

## 2024-06-03 NOTE — TELEPHONE ENCOUNTER
Medication Quantity Refills Start End   butalbital-acetaminophen-caffeine -40 MG Oral Tab 60 tablet 0 5/10/2024 --   Sig:   Take 1 tablet by mouth 2 (two) times daily as needed. APPOINTMENT NEEDED FOR FURTHER REFILLS     Route:   Oral     Order #:   [id number]         Please review and sign if appropriate       LOV:04/16/24  NOV: none    [place]24 (#60 /0 refills)

## 2024-06-05 RX ORDER — LISINOPRIL 10 MG/1
10 TABLET ORAL 2 TIMES DAILY
Qty: 180 TABLET | Refills: 3 | Status: SHIPPED | OUTPATIENT
Start: 2024-06-05

## 2024-06-06 ENCOUNTER — TELEPHONE (OUTPATIENT)
Dept: NEUROLOGY | Facility: CLINIC | Age: 59
End: 2024-06-06

## 2024-06-06 NOTE — TELEPHONE ENCOUNTER
Please review. Rx failed/no protocol.    Requested Prescriptions   Pending Prescriptions Disp Refills    mirtazapine 15 MG Oral Tab 90 tablet 3     Sig: Take 1 tablet (15 mg total) by mouth nightly.       There is no refill protocol information for this order      Signed Prescriptions Disp Refills    lisinopril 10 MG Oral Tab 180 tablet 3     Sig: Take 1 tablet (10 mg total) by mouth 2 (two) times daily.       Hypertension Medications Protocol Passed - 6/2/2024  8:07 PM        Passed - CMP or BMP in past 12 months        Passed - Last BP reading less than 140/90     BP Readings from Last 1 Encounters:   04/19/24 112/82               Passed - In person appointment or virtual visit in the past 12 mos or appointment in next 3 mos     Recent Outpatient Visits              1 month ago Chronic post-traumatic headache, not intractable    [facility] Belia Cleaning MD    Office Visit    2 months ago Mixed hyperlipidemia    [facility] Krish Little MD    Office Visit    7 months ago Chronic post-traumatic headache, not intractable    [facility] [place] Belia Cleaning MD    Office Visit    9 months ago Annual physical exam    [facility] Krish Little MD    Office Visit    10 months ago Stage 3a chronic kidney disease (HCC)    [facility] Ajit West MD    Office Visit          Future Appointments         Provider Department Appt Notes    In 1 week Debi Servin MD [facility] Follow up    In 1 week Ajit West MD [facility] Follow up                    Passed - EGFRCR or GFRAA > 50     GFR Evaluation  EGFRCR: 60 , resulted on 3/21/2024               Future Appointments         Provider Department  Appt Notes    In 1 week Debi Servin MD [facility], [facility] [place] Follow up    In 1 week Ajit West MD [facility], [facility] Follow up          Recent Outpatient Visits              1 month ago Chronic post-traumatic headache, not intractable    [facility], [facility] Belia Cleaning MD    Office Visit    2 months ago Mixed hyperlipidemia    [facility], [facility] Krish Little MD    Office Visit    7 months ago Chronic post-traumatic headache, not intractable    [facility] Belia Cleaning MD    Office Visit    9 months ago Annual physical exam    [facility] [place] Krish Little MD    Office Visit    10 months ago Stage 3a chronic kidney disease (HCC)    [facility] Ajit West MD    Office Visit

## 2024-06-07 RX ORDER — MIRTAZAPINE 15 MG/1
15 TABLET, FILM COATED ORAL NIGHTLY
Qty: 90 TABLET | Refills: 1 | Status: SHIPPED | OUTPATIENT
Start: 2024-06-07

## 2024-06-14 ENCOUNTER — OFFICE VISIT (OUTPATIENT)
Dept: ENDOCRINOLOGY CLINIC | Facility: CLINIC | Age: 59
End: 2024-06-14

## 2024-06-14 VITALS
SYSTOLIC BLOOD PRESSURE: 136 MMHG | BODY MASS INDEX: 39.23 KG/M2 | DIASTOLIC BLOOD PRESSURE: 94 MMHG | HEART RATE: 98 BPM | WEIGHT: 229.81 LBS | HEIGHT: 64 IN

## 2024-06-14 DIAGNOSIS — M85.80 OSTEOPENIA, UNSPECIFIED LOCATION: ICD-10-CM

## 2024-06-14 DIAGNOSIS — E55.9 VITAMIN D DEFICIENCY: ICD-10-CM

## 2024-06-14 DIAGNOSIS — R74.8 HIGH SERUM BONE-SPECIFIC ALKALINE PHOSPHATASE: Primary | ICD-10-CM

## 2024-06-14 DIAGNOSIS — R79.89 HIGH SERUM PARATHYROID HORMONE (PTH): ICD-10-CM

## 2024-06-14 PROCEDURE — 99213 OFFICE O/P EST LOW 20 MIN: CPT | Performed by: INTERNAL MEDICINE

## 2024-06-14 NOTE — PROGRESS NOTES
Covid not detected FU VISIT:     CHIEF COMPLAINT:    High alk phos     HISTORY OF PRESENT ILLNESS:   Lauren Weston is a 58 year old female who presents for evaluation of high alk phos  Noted on labs for many years    She fractures her ankle after she fell backwards from a stool  No other fractures  She takes vit D daily  2000 units daily     No renal stones    H/o hysterectomy in 2005, ovaries were intact    PAST MEDICAL HISTORY:   Past Medical History:    Back pain    Heart murmur    High blood pressure    Hx of head injury    Hx of migraine headaches    Hyperlipidemia    Seizure disorder (HCC)    Brain spasms    Spasm    Chronic nerve spasms    TBI (traumatic brain injury) (HCC)    Unspecified essential hypertension    Visual impairment    glasses       PAST SURGICAL HISTORY:   Past Surgical History:   Procedure Laterality Date    Cholecystectomy  1999    Colonoscopy N/A 08/17/2017    Procedure: COLONOSCOPY;  Surgeon: Duane Almazan MD;  Location: [facility] ENDOSCOPY    Electrocardiogram, complete  04/16/2012    SCANNED TO MEDIA TAB: 04-    Hysterectomy  2007    partial    Wil biopsy stereo nodule 1 site right (cpt=19081) Right 04/2022    ADH    Wil localization wire 1 site right (cpt=19281) Right 06/2022    Usual ductal hyperplasia    Other Left     rempved mass on hip    Tubal ligation Bilateral 1990       CURRENT MEDICATIONS:    Current Outpatient Medications   Medication Sig Dispense Refill    mirtazapine 15 MG Oral Tab Take 1 tablet (15 mg total) by mouth nightly. 90 tablet 1    lisinopril 10 MG Oral Tab Take 1 tablet (10 mg total) by mouth 2 (two) times daily. 180 tablet 3    butalbital-acetaminophen-caffeine -40 MG Oral Tab Take 1 tablet by mouth 2 (two) times daily as needed. APPOINTMENT NEEDED FOR FURTHER REFILLS 60 tablet 0    diazePAM 5 MG Oral Tab Take 1 tablet (5 mg total) by mouth every 12 (twelve) hours as needed for Anxiety (spasm). 60 tablet 2    amLODIPine 10 MG Oral Tab Take 1 tablet (10 mg total) by  mouth nightly. 90 tablet 3    Amitriptyline HCl 100 MG Oral Tab Take 1 tablet (100 mg total) by mouth nightly. 30 tablet 5    gabapentin 300 MG Oral Cap Take 1 capsule (300 mg total) by mouth 4 (four) times daily. 360 capsule 3    atorvastatin 40 MG Oral Tab Take 1 tablet (40 mg total) by mouth nightly. 90 tablet 1    fluticasone propionate 50 MCG/ACT Nasal Suspension 2 sprays by Each Nare route daily. 1 each 2    Albuterol Sulfate  (90 Base) MCG/ACT Inhalation Aero Soln Inhale 2 puffs into the lungs every 6 (six) hours as needed for Wheezing. 1 each 0       ALLERGIES:  Allergies   Allergen Reactions    Baby Oil RASH and RESPIRATORY FAILURE    Clemizole ANAPHYLAXIS    [place] RESPIRATORY FAILURE    Orange Juice [Orange Oil] RESPIRATORY FAILURE    Banana NAUSEA AND VOMITING    Penicillins ANAPHYLAXIS       SOCIAL HISTORY:    Social History     Socioeconomic History    Marital status:     Number of children: 2   Occupational History    Occupation: Unemployed   Tobacco Use    Smoking status: Every Day     Current packs/day: 0.25     Average packs/day: 0.3 packs/day for 21.0 years (5.3 ttl pk-yrs)     Types: Cigarettes     Passive exposure: Current    Smokeless tobacco: Never    Tobacco comments:     smokes only 3-4 sticks /day   Vaping Use    Vaping status: Never Used   Substance and Sexual Activity    Alcohol use: No     Alcohol/week: 0.0 standard drinks of alcohol    Drug use: Yes     Frequency: 7.0 times per week     Types: Cannabis     Comment: daily    Other Topics Concern    Caffeine Concern No    Exercise No     Comment: Daily       FAMILY HISTORY:   Family History   Problem Relation Age of Onset    Heart Disorder Father         Bypass x 3, heroin addict, kidney stones    No Known Problems Mother     No Known Problems Sister        ASSESSMENTS:     REVIEW OF SYSTEMS:  Constitutional: Negative for: weight change, fever, fatigue, cold/heat intolerance  Eyes: Negative for:  Visual changes,  proptosis, blurring  ENT: Negative for:  dysphagia, neck swelling, dysphonia  Respiratory: Negative for:  dyspnea, cough  Cardiovascular: Negative for:  chest pain, palpitations, orthopnea  GI: Negative for:  abdominal pain, nausea, vomiting, diarrhea, constipation, bleeding  Neurology: Negative for: headache, numbness, weakness  Genito-Urinary: Negative for: dysuria, frequency  Psychiatric: Negative for:  depression, anxiety  Hematology/Lymphatics: Negative for: bruising, lower extremity edema  Endocrine: Negative for: polyuria, polydypsia  Skin: Negative for: rash, blister, cellulitis,      PHYSICAL EXAM:   Vitals:    06/14/24 1358   BP: (!) 136/94   Pulse: 98   Weight: 229 lb 12.8 oz (104.2 kg)   Height: 5' 4\" (1.626 m)       BMI: Body mass index is 39.45 kg/m².         General Appearance:  alert, well developed, in no acute distress  Head: Atraumatic  Eyes:  normal conjunctivae, sclera., normal sclera and normal pupils  Throat/Neck: normal sound to voice. Normal hearing, normal speech  Respiratory:  Speaking in full sentences, non-labored. no increased work of breathing, no audible wheezing    Neuro: motor grossly intact, moving all extremities without difficulty  Psychiatric:  oriented to time, self, and place  Extremities: Swelling in right elbow, non tender --> looks like an effusion --> she will schedule apt with Dr Little        DATA:     Pertinent data reviewed      ASSESSMENT AND PLAN:      Patient is a 57 year old female with persistent  elevation of alk phos    She has CKD  Vit D normal   DXA : osteopenia  NM bone scan; no evidence of Paget's  Noted one time elevation in PTH, prior to and subsequent level normal    Will check labs as below    Regarding osteopenia    Osteopenia is present when the BMD is between 1.0 and 2.5 SDs below bone density of young healthy individuals. More than 50% of fragility fractures occur in these patients. Osteoporosis is defined as a BMD?-2.5 SDs of young normal,  healthy individuals.  If the 10-year absolute fracture risk is ?3% for hip fractures or ?20% for other major osteoporotic fractures, pharmacologic therapy should be considered  Fracture risk is not high enough to warrant Rx at this time  Will follow DXA in 2024   Diet: Eat foods with high calcium: milk with vitamin D added, fish from the ocean, yogurt, green leafy vegetables  Exercise: increase physical activity   should include weight-bearing, muscle-strengthening, and balance training exercises for 30 minutes 5 days per week or 75 minutes twice weekly, often consistent with other general health recommendations. Weight-bearing exercises are activities that make the body move against gravity such as walking, jogging, dancing, tennis, and Isma Chi. To protect the spine in patients with low spinal bone density, maintaining a straight spine and avoiding arching and twisting are generally recommended. Fall precautions    Patient verbalized a complete  understanding of all of the above and did not have any further questions.         Orders Placed This Encounter   Procedures    PTH Intact with minerals    Alk Phosphatase, Bone Specific    Vitamin D [E]           Debi Servin MD        Patient verbalized a complete  understanding of all of the above and did not have any further questions.

## 2024-06-24 ENCOUNTER — TELEPHONE (OUTPATIENT)
Dept: NEUROLOGY | Facility: CLINIC | Age: 59
End: 2024-06-24

## 2024-07-06 DIAGNOSIS — G44.329 CHRONIC POST-TRAUMATIC HEADACHE, NOT INTRACTABLE: ICD-10-CM

## 2024-07-08 NOTE — TELEPHONE ENCOUNTER
Requested Prescriptions     Pending Prescriptions Disp Refills    butalbital-acetaminophen-caffeine -40 MG Oral Tab 60 tablet 0     Sig: Take 1 tablet by mouth 2 (two) times daily as needed. APPOINTMENT NEEDED FOR FURTHER REFILLS     Last OV: 4/19/2024 with a return in about 6 months (around 10/19/2024).   Next OV: None    [place]

## 2024-07-09 RX ORDER — BUTALBITAL, ACETAMINOPHEN AND CAFFEINE 50; 325; 40 MG/1; MG/1; MG/1
1 TABLET ORAL 2 TIMES DAILY PRN
Qty: 60 TABLET | Refills: 0 | Status: SHIPPED | OUTPATIENT
Start: 2024-07-09

## 2024-08-04 DIAGNOSIS — F41.9 ANXIETY: ICD-10-CM

## 2024-08-04 DIAGNOSIS — G44.329 CHRONIC POST-TRAUMATIC HEADACHE, NOT INTRACTABLE: ICD-10-CM

## 2024-08-04 DIAGNOSIS — M62.838 MUSCLE SPASM: ICD-10-CM

## 2024-08-06 NOTE — TELEPHONE ENCOUNTER
Medication: Diazepam 5mg     Date of last refill: 5/10/24 (#60/2)  [place]24 #60     Medication: Butalbital-acetaminophen-caffeine -40mg     Date of last refill: 7/9/24 (#60/0)  [place] 7[place]13/24 #60      Last office visit: 4/19/24  Due back to clinic per last office note:  6-9m  Date next office visit scheduled:    No future appointments.        Last OV note recommendation:    History of chronic post traumatic headache and now superimposed stress headaches and insomnia     1. Increase dose of Amitriptyline to 100 mg daily (aware of potential interaction with Mirtazipine and tolerating fine)  2. Continue Fioricet prn ( limit 2-3 per week)  3. On Valium chronically for spasms and anxiety- unable to wean off     Advise to see a therapist for stress management         RTC in about 6-9 months

## 2024-08-07 RX ORDER — BUTALBITAL, ACETAMINOPHEN AND CAFFEINE 50; 325; 40 MG/1; MG/1; MG/1
1 TABLET ORAL 2 TIMES DAILY PRN
Qty: 60 TABLET | Refills: 0 | Status: SHIPPED | OUTPATIENT
Start: 2024-08-07

## 2024-08-07 RX ORDER — DIAZEPAM 5 MG/1
5 TABLET ORAL EVERY 12 HOURS PRN
Qty: 60 TABLET | Refills: 2 | Status: SHIPPED | OUTPATIENT
Start: 2024-08-07

## 2024-09-05 DIAGNOSIS — G44.329 CHRONIC POST-TRAUMATIC HEADACHE, NOT INTRACTABLE: ICD-10-CM

## 2024-09-06 RX ORDER — BUTALBITAL, ACETAMINOPHEN AND CAFFEINE 50; 325; 40 MG/1; MG/1; MG/1
1 TABLET ORAL 2 TIMES DAILY PRN
Qty: 60 TABLET | Refills: 0 | Status: SHIPPED | OUTPATIENT
Start: 2024-09-06

## 2024-09-06 NOTE — TELEPHONE ENCOUNTER
Requested Prescriptions     Pending Prescriptions Disp Refills    butalbital-acetaminophen-caffeine -40 MG Oral Tab 60 tablet 0     Sig: Take 1 tablet by mouth 2 (two) times daily as needed.     Last OV:   Next OV: With Neurology (Belia Ely MD)  10/11/2024 at 10:50 AM     [place]   Butalbital-APAP-Caffeine     Dispensed Written Strength Quantity Refills Days Supply Provider Pharmacy   BUTALBITAL-ACETAMINOPHEN-CAFFEINE 08/14/2024 08/07/2024 325-50-40 60  30 BELIA ELY MD [facility]    BUTALBITAL-ACETAMINOPHEN-CAFFEINE 07/13/2024 07/09/2024 325-50-40 60  30 BELIA ELY MD [facility]    BUTALBITAL-ACETAMINOPHEN-CAFFEINE 06/15/2024 06/03/2024 325-50-40 60  30 BELIA ELY MD [facility]

## 2024-09-25 RX ORDER — ATORVASTATIN CALCIUM 40 MG/1
40 TABLET, FILM COATED ORAL NIGHTLY
Qty: 90 TABLET | Refills: 3 | Status: SHIPPED | OUTPATIENT
Start: 2024-09-25

## 2024-09-25 NOTE — TELEPHONE ENCOUNTER
REFILL PASSED PER [facility] PROTOCOLS    Requested Prescriptions   Pending Prescriptions Disp Refills    ATORVASTATIN 40 MG Oral Tab [Pharmacy Med Name: Atorvastatin Calcium 40 MG Oral Tablet] 90 tablet 0     Sig: Take 1 tablet by mouth nightly       Cholesterol Medication Protocol Passed - 9/24/2024  3:36 PM        Passed - ALT < 80     Lab Results   Component Value Date    ALT 12 03/21/2024             Passed - ALT resulted within past year        Passed - Lipid panel within past 12 months     Lab Results   Component Value Date    CHOLEST 215 (H) 03/21/2024    TRIG 98 03/21/2024    HDL 47 03/21/2024     (H) 03/21/2024    VLDL 19 03/21/2024    NONHDLC 168 (H) 03/21/2024             Passed - In person appointment or virtual visit in the past 12 mos or appointment in next 3 mos     Recent Outpatient Visits              3 months ago High serum bone-specific alkaline phosphatase    [facility] Debi [facility]in [facility]    Office Visit    5 months ago Chronic post-traumatic headache, not intractable    [facility] Belia Cleaning MD    Office Visit    6 months ago Mixed hyperlipidemia    [facility] Krish Little MD    Office Visit    11 months ago Chronic post-traumatic headache, not intractable    [facility]    Office Visit    1 year ago Annual physical exam    [facility] Krish Little MD    Office Visit          Future Appointments         Provider Department Appt Notes    In 2 weeks Krish Little MD [facility] Everything    In 4 weeks Belia Cleaning MD [facility] Lost of headaches                         Future Appointments         Provider Department Appt  Notes    In 2 weeks Krish Little MD [facility], [facility] [place] Everything    In 4 weeks Belia Cleaning MD [facility], [facility] Lost of headaches          Recent Outpatient Visits              3 months ago High serum bone-specific alkaline phosphatase    [facility], [facility], Debi Ramirez MD    Office Visit    5 months ago Chronic post-traumatic headache, not intractable    [facility], [facility] Belia Cleaning MD    Office Visit    6 months ago Mixed hyperlipidemia    [facility], [address], [place] Krish Little MD    Office Visit    11 months ago Chronic post-traumatic headache, not intractable    [facility] [place] Belia Cleaning MD    Office Visit    1 year ago Annual physical exam    [facility], Krish Andersen MD    Office Visit

## 2024-10-06 DIAGNOSIS — G44.329 CHRONIC POST-TRAUMATIC HEADACHE, NOT INTRACTABLE: ICD-10-CM

## 2024-10-07 RX ORDER — BUTALBITAL, ACETAMINOPHEN AND CAFFEINE 50; 325; 40 MG/1; MG/1; MG/1
1 TABLET ORAL 2 TIMES DAILY PRN
Qty: 60 TABLET | Refills: 1 | Status: SHIPPED | OUTPATIENT
Start: 2024-10-07
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
## 2024-10-10 ENCOUNTER — LAB ENCOUNTER (OUTPATIENT)
Dept: LAB | Age: 59
End: 2024-10-10
Attending: INTERNAL MEDICINE
Payer: MEDICAID

## 2024-10-10 ENCOUNTER — OFFICE VISIT (OUTPATIENT)
Dept: INTERNAL MEDICINE CLINIC | Facility: CLINIC | Age: 59
End: 2024-10-10

## 2024-10-10 DIAGNOSIS — E78.2 MIXED HYPERLIPIDEMIA: ICD-10-CM

## 2024-10-10 DIAGNOSIS — R77.8 ABNORMAL SERUM PROTEIN ELECTROPHORESIS: ICD-10-CM

## 2024-10-10 DIAGNOSIS — Z00.00 ANNUAL PHYSICAL EXAM: Primary | ICD-10-CM

## 2024-10-10 DIAGNOSIS — R56.9 SEIZURE (HCC): ICD-10-CM

## 2024-10-10 DIAGNOSIS — R79.89 HIGH SERUM PARATHYROID HORMONE (PTH): ICD-10-CM

## 2024-10-10 DIAGNOSIS — N60.91 ATYPICAL DUCTAL HYPERPLASIA OF RIGHT BREAST: ICD-10-CM

## 2024-10-10 DIAGNOSIS — E55.9 VITAMIN D DEFICIENCY: ICD-10-CM

## 2024-10-10 DIAGNOSIS — Z86.39 HISTORY OF PINEAL CYST: ICD-10-CM

## 2024-10-10 DIAGNOSIS — R74.8 HIGH SERUM BONE-SPECIFIC ALKALINE PHOSPHATASE: ICD-10-CM

## 2024-10-10 DIAGNOSIS — I10 PRIMARY HYPERTENSION: ICD-10-CM

## 2024-10-10 DIAGNOSIS — F41.9 ANXIETY AND DEPRESSION: ICD-10-CM

## 2024-10-10 DIAGNOSIS — N18.31 STAGE 3A CHRONIC KIDNEY DISEASE (HCC): ICD-10-CM

## 2024-10-10 DIAGNOSIS — R92.8 ABNORMAL MAMMOGRAM: ICD-10-CM

## 2024-10-10 DIAGNOSIS — F32.A ANXIETY AND DEPRESSION: ICD-10-CM

## 2024-10-10 LAB
ALBUMIN SERPL-MCNC: 4.5 G/DL (ref 3.2–4.8)
ALBUMIN/GLOB SERPL: 1.5 {RATIO} (ref 1–2)
ALP LIVER SERPL-CCNC: 118 U/L
ALT SERPL-CCNC: 13 U/L
ANION GAP SERPL CALC-SCNC: 7 MMOL/L (ref 0–18)
AST SERPL-CCNC: 14 U/L (ref ?–34)
BILIRUB SERPL-MCNC: 0.2 MG/DL (ref 0.3–1.2)
BUN BLD-MCNC: 13 MG/DL (ref 9–23)
BUN/CREAT SERPL: 11.9 (ref 10–20)
CALCIUM BLD-MCNC: 9.8 MG/DL (ref 8.7–10.4)
CALCIUM BLD-MCNC: 9.8 MG/DL (ref 8.7–10.4)
CHLORIDE SERPL-SCNC: 111 MMOL/L (ref 98–112)
CHOLEST SERPL-MCNC: 235 MG/DL (ref ?–200)
CO2 SERPL-SCNC: 23 MMOL/L (ref 21–32)
CREAT BLD-MCNC: 1.09 MG/DL
CREAT BLD-MCNC: 1.1 MG/DL
EGFRCR SERPLBLD CKD-EPI 2021: 59 ML/MIN/1.73M2 (ref 60–?)
FASTING PATIENT LIPID ANSWER: YES
FASTING STATUS PATIENT QL REPORTED: YES
GLOBULIN PLAS-MCNC: 3.1 G/DL (ref 2–3.5)
GLUCOSE BLD-MCNC: 101 MG/DL (ref 70–99)
HDLC SERPL-MCNC: 49 MG/DL (ref 40–59)
LDLC SERPL CALC-MCNC: 170 MG/DL (ref ?–100)
NONHDLC SERPL-MCNC: 186 MG/DL (ref ?–130)
OSMOLALITY SERPL CALC.SUM OF ELEC: 292 MOSM/KG (ref 275–295)
PHOSPHATE SERPL-MCNC: 4.3 MG/DL (ref 2.4–5.1)
POTASSIUM SERPL-SCNC: 4 MMOL/L (ref 3.5–5.1)
PROT SERPL-MCNC: 7.6 G/DL (ref 5.7–8.2)
PTH-INTACT SERPL-MCNC: 72 PG/ML (ref 18.5–88)
SODIUM SERPL-SCNC: 141 MMOL/L (ref 136–145)
TRIGL SERPL-MCNC: 89 MG/DL (ref 30–149)
VIT D+METAB SERPL-MCNC: 37.1 NG/ML (ref 30–100)
VLDLC SERPL CALC-MCNC: 18 MG/DL (ref 0–30)

## 2024-10-10 PROCEDURE — 80053 COMPREHEN METABOLIC PANEL: CPT

## 2024-10-10 PROCEDURE — 36415 COLL VENOUS BLD VENIPUNCTURE: CPT

## 2024-10-10 PROCEDURE — 99396 PREV VISIT EST AGE 40-64: CPT | Performed by: INTERNAL MEDICINE

## 2024-10-10 PROCEDURE — 82306 VITAMIN D 25 HYDROXY: CPT

## 2024-10-10 PROCEDURE — 84100 ASSAY OF PHOSPHORUS: CPT

## 2024-10-10 PROCEDURE — 84080 ASSAY ALKALINE PHOSPHATASES: CPT

## 2024-10-10 PROCEDURE — 82310 ASSAY OF CALCIUM: CPT

## 2024-10-10 PROCEDURE — 80061 LIPID PANEL: CPT

## 2024-10-10 PROCEDURE — 83970 ASSAY OF PARATHORMONE: CPT

## 2024-10-10 PROCEDURE — 82565 ASSAY OF CREATININE: CPT

## 2024-10-10 NOTE — PROGRESS NOTES
Subjective:     Patient ID: Lauren Weston is a 59 year old female.    Patient presents today for her annual physical.         History/Other:   Review of Systems   Constitutional: Negative.    Respiratory: Negative.     Cardiovascular: Negative.    Gastrointestinal: Negative.    Genitourinary: Negative.    Allergic/Immunologic: Negative for immunocompromised state.   Hematological: Negative.      Current Outpatient Medications   Medication Sig Dispense Refill    butalbital-acetaminophen-caffeine -40 MG Oral Tab Take 1 tablet by mouth 2 (two) times daily as needed. 60 tablet 1    atorvastatin 40 MG Oral Tab Take 1 tablet (40 mg total) by mouth nightly. 90 tablet 3    diazePAM 5 MG Oral Tab Take 1 tablet (5 mg total) by mouth every 12 (twelve) hours as needed for Anxiety (spasm). 60 tablet 2    mirtazapine 15 MG Oral Tab Take 1 tablet (15 mg total) by mouth nightly. 90 tablet 1    lisinopril 10 MG Oral Tab Take 1 tablet (10 mg total) by mouth 2 (two) times daily. 180 tablet 3    amLODIPine 10 MG Oral Tab Take 1 tablet (10 mg total) by mouth nightly. 90 tablet 3    Amitriptyline HCl 100 MG Oral Tab Take 1 tablet (100 mg total) by mouth nightly. 30 tablet 5    gabapentin 300 MG Oral Cap Take 1 capsule (300 mg total) by mouth 4 (four) times daily. 360 capsule 3    fluticasone propionate 50 MCG/ACT Nasal Suspension 2 sprays by Each Nare route daily. 1 each 2    Albuterol Sulfate  (90 Base) MCG/ACT Inhalation Aero Soln Inhale 2 puffs into the lungs every 6 (six) hours as needed for Wheezing. 1 each 0     Allergies:Allergies[1]    Past Medical History:    Back pain    Heart murmur    High blood pressure    Hx of head injury    Hx of migraine headaches    Hyperlipidemia    Seizure disorder (HCC)    Brain spasms    Spasm    Chronic nerve spasms    TBI (traumatic brain injury) (HCC)    Unspecified essential hypertension    Visual impairment    glasses      Past Surgical History:   Procedure Laterality Date     Cholecystectomy  1999    Colonoscopy N/A 08/17/2017    Procedure: COLONOSCOPY;  Surgeon: Duane Almazan MD;  Location[facility] ENDOSCOPY    Electrocardiogram, complete  04/16/2012    SCANNED TO MEDIA TAB: 04-    Hysterectomy  2007    partial    Wil biopsy stereo nodule 1 site right (cpt=19081) Right 04/2022    ADH    Wil localization wire 1 site right (cpt=19281) Right 06/2022    Usual ductal hyperplasia    Other Left     rempved mass on hip    Tubal ligation Bilateral 1990      Family History   Problem Relation Age of Onset    Heart Disorder Father         Bypass x 3, heroin addict, kidney stones    No Known Problems Mother     No Known Problems Sister       Social History:   Social History     Socioeconomic History    Marital status:     Number of children: 2   Occupational History    Occupation: Unemployed   Tobacco Use    Smoking status: Every Day     Current packs/day: 0.25     Average packs/day: 0.3 packs/day for 21.0 years (5.3 ttl pk-yrs)     Types: Cigarettes     Passive exposure: Current    Smokeless tobacco: Never    Tobacco comments:     smokes only 3-4 sticks /day   Vaping Use    Vaping status: Never Used   Substance and Sexual Activity    Alcohol use: No     Alcohol/week: 0.0 standard drinks of alcohol    Drug use: Yes     Frequency: 7.0 times per week     Types: Cannabis     Comment: daily    Other Topics Concern    Caffeine Concern No    Exercise No     Comment: Daily   Social History Narrative    The patient does not use an assistive device..      The patient does not live in a home with stairs.        Objective:   Physical Exam  Constitutional:       General: She is not in acute distress.     Appearance: She is well-developed. She is obese. She is not ill-appearing, toxic-appearing or diaphoretic.   HENT:      Head: Normocephalic and atraumatic.      Right Ear: External ear normal.      Left Ear: External ear normal.      Nose: Nose normal.      Mouth/Throat:      Pharynx: No  oropharyngeal exudate.   Eyes:      General:         Right eye: No discharge.         Left eye: No discharge.      Conjunctiva/sclera: Conjunctivae normal.      Pupils: Pupils are equal, round, and reactive to light.   Neck:      Vascular: No carotid bruit or JVD.   Cardiovascular:      Rate and Rhythm: Normal rate and regular rhythm.      Heart sounds: Normal heart sounds. No murmur heard.  Pulmonary:      Effort: Pulmonary effort is normal. No respiratory distress.      Breath sounds: Normal breath sounds. No wheezing or rales.   Abdominal:      General: Bowel sounds are normal. There is no distension.      Palpations: Abdomen is soft. There is no mass.      Tenderness: There is no abdominal tenderness. There is no guarding or rebound.   Musculoskeletal:         General: No tenderness. Normal range of motion.      Cervical back: Normal range of motion and neck supple. No rigidity or tenderness.      Right lower leg: No edema.      Left lower leg: No edema.   Lymphadenopathy:      Cervical: No cervical adenopathy.   Skin:     General: Skin is warm and dry.      Coloration: Skin is not jaundiced or pale.      Findings: No rash.   Neurological:      Mental Status: She is alert and oriented to person, place, and time.         Assessment & Plan:   (Z00.00) Annual physical exam  (primary encounter diagnosis)  Plan: routine labs ordered. Pt declined flu shot.     (I10) Primary hypertension  Plan: bp controlled. Cpm.     (E78.2) Mixed hyperlipidemia  Plan: Lipid Panel, Comp Metabolic Panel (14) [E]        Check lipid panel; continue with low chol diet and chol med.     (N18.31) Stage 3a chronic kidney disease (HCC)  Plan: check renal panel; continue to avoid nsaids.     (R92.8) Abnormal mammogram  Plan: [facility] TY 2D+3D DIAGNOSTIC [facility]         (CPT=[id number]), Surgery Referral - In         Network        Pt overdue for her mammogram, order given and then ffup with Dr Crockett.     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 Referral given .    (R56.9) Seizure (HCC)  Plan: had been controlled with current seizure med managed by her neurologist. Cpm .    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     (F41.9,  F32.A) Anxiety and depression  Plan: pt continues to ffup with her psychiatrist.     (N60.91) Atypical ductal hyperplasia of right breast  Plan: pt had been ff by Dr Crockett. Pt given referral for ffup .        Orders Placed This Encounter   Procedures    Lipid Panel    Comp Metabolic Panel (14) [E]       Meds This Visit:  Requested Prescriptions      No prescriptions requested or ordered in this encounter       Imaging & Referrals:  SURGERY - INTERNAL  [facility] TY 2D+3D DIAGNOSTIC [facility]  BILAT (DAU=[id number])          [1]   Allergies  Allergen Reactions    Baby Oil RASH and RESPIRATORY FAILURE    Clemizole ANAPHYLAXIS    [place] RESPIRATORY FAILURE    Orange Juice [Orange Oil] RESPIRATORY FAILURE    Banana NAUSEA AND VOMITING    Penicillins ANAPHYLAXIS

## 2024-10-11 VITALS
HEIGHT: 64 IN | HEART RATE: 90 BPM | TEMPERATURE: 98 F | WEIGHT: 231.69 LBS | OXYGEN SATURATION: 99 % | SYSTOLIC BLOOD PRESSURE: 120 MMHG | BODY MASS INDEX: 39.55 KG/M2 | DIASTOLIC BLOOD PRESSURE: 80 MMHG

## 2024-10-13 ENCOUNTER — TELEPHONE (OUTPATIENT)
Dept: INTERNAL MEDICINE CLINIC | Facility: CLINIC | Age: 59
End: 2024-10-13

## 2024-10-13 DIAGNOSIS — E78.2 MIXED HYPERLIPIDEMIA: Primary | ICD-10-CM

## 2024-10-13 RX ORDER — EZETIMIBE 10 MG/1
10 TABLET ORAL DAILY
Qty: 90 TABLET | Refills: 0 | Status: SHIPPED | OUTPATIENT
Start: 2024-10-13

## 2024-10-15 LAB — ALKALINE PHOSPHATASE BONE SPECIFIC: 19.7 UG/L

## 2024-10-16 DIAGNOSIS — G44.329 CHRONIC POST-TRAUMATIC HEADACHE, NOT INTRACTABLE: Primary | ICD-10-CM

## 2024-10-16 RX ORDER — AMITRIPTYLINE HYDROCHLORIDE 100 MG/1
100 TABLET ORAL NIGHTLY
Qty: 30 TABLET | Refills: 2 | Status: SHIPPED | OUTPATIENT
Start: 2024-10-16

## 2024-10-16 NOTE — TELEPHONE ENCOUNTER
Medication: AMITRIPTYLINE  MG Oral Tab      Date of last refill: 04/19/2024 (#30/5)  [place]     Last office visit: 04/19/2024  Due back to clinic per last office note:  Around 10/19/2024  Date next office visit scheduled:    Future Appointments   Date Time Provider Department Center   10/23/2024  1:00 PM Belia Cleaning MD [facility] [place]   11/12/2024 11:40 AM Ajit [facility] MD [id number]   12/12/2024 10:30 AM Liza Marcano APRN [facility]           Last OV note recommendation:    ASSESSMENT/PLAN:      (G44.329) Chronic post-traumatic headache, not intractable        History of chronic post traumatic headache and now superimposed stress headaches and insomnia     1. Increase dose of Amitriptyline to 100 mg daily (aware of potential interaction with Mirtazipine and tolerating fine)  2. Continue Fioricet prn ( limit 2-3 per week)  3. On Valium chronically for spasms and anxiety- unable to wean off     Advise to see a therapist for stress management         RTC in about 6-9 months     See orders and medications filed with this encounter. The patient indicates understanding of these issues and agrees with the plan.

## 2024-10-23 ENCOUNTER — OFFICE VISIT (OUTPATIENT)
Dept: NEUROLOGY | Facility: CLINIC | Age: 59
End: 2024-10-23
Payer: MEDICAID

## 2024-10-23 VITALS
DIASTOLIC BLOOD PRESSURE: 78 MMHG | SYSTOLIC BLOOD PRESSURE: 130 MMHG | BODY MASS INDEX: 39 KG/M2 | WEIGHT: 225 LBS | RESPIRATION RATE: 16 BRPM | HEART RATE: 86 BPM

## 2024-10-23 DIAGNOSIS — G44.329 CHRONIC POST-TRAUMATIC HEADACHE, NOT INTRACTABLE: Primary | ICD-10-CM

## 2024-10-23 PROCEDURE — 99213 OFFICE O/P EST LOW 20 MIN: CPT | Performed by: OTHER

## 2024-10-23 RX ORDER — TOPIRAMATE 50 MG/1
50 TABLET, FILM COATED ORAL EVERY EVENING
Qty: 30 TABLET | Refills: 2 | Status: SHIPPED | OUTPATIENT
Start: 2024-10-23

## 2024-10-23 NOTE — PATIENT INSTRUCTIONS
Reduce Amitriptyline 50 mg nightly x 1 week then take 50 mg every other night x 1 week then discontinue it completely         2. Start  Topiramate 50 mg in the evening for headaches      3.  Consider weight loss clinic            Refill policies:    Allow 2-3 business days for refills; controlled substances may take longer.  Contact your pharmacy at least 5 days prior to running out of medication and have them send an electronic request or submit request through the “request refill” option in your [employer] account.  Refills are not addressed on weekends; covering physicians do not authorize routine medications on weekends.  No narcotics or controlled substances are refilled after noon on Fridays or by on call physicians.  By law, narcotics must be electronically prescribed.  A 30 day supply with no refills is the maximum allowed.  If your prescription is due for a refill, you may be due for a follow up appointment.  To best provide you care, patients receiving routine medications need to be seen at least once a year.  Patients receiving narcotic/controlled substance medications need to be seen at least once every 3 months.  In the event that your preferred pharmacy does not have the requested medication in stock (e.g. Backordered), it is your responsibility to find another pharmacy that has the requested medication available.  We will gladly send a new prescription to that pharmacy at your request.    Scheduling Tests:    If your physician has ordered radiology tests such as MRI or CT scans, please contact Central Scheduling at [phone] right away to schedule the test.  Once scheduled, the [facility] Centralized Referral Team will work with your insurance carrier to obtain pre-certification or prior authorization.  Depending on your insurance carrier, approval may take 3-10 days.  It is highly recommended patients assure they have received an authorization before having a test performed.  If test is done without  insurance authorization, patient may be responsible for the entire amount billed.      Precertification and Prior Authorizations:  If your physician has recommended that you have a procedure or additional testing performed the [facility] Centralized Referral Team will contact your insurance carrier to obtain pre-certification or prior authorization.    You are strongly encouraged to contact your insurance carrier to verify that your procedure/test has been approved and is a COVERED benefit.  Although the [facility] Centralized Referral Team does its due diligence, the insurance carrier gives the disclaimer that \"Although the procedure is authorized, this does not guarantee payment.\"    Ultimately the patient is responsible for payment.   Thank you for your understanding in this matter.  Paperwork Completion:  If you require FMLA or disability paperwork for your recovery, please make sure to either drop it off or have it faxed to our office at [phone]. Be sure the form has your name and date of birth on it.  The form will be faxed to our Forms Department and they will complete it for you.  There is a 25$ fee for all forms that need to be filled out.  Please be aware there is a 10-14 day turnaround time.  You will need to sign a release of information (RORY) form if your paperwork does not come with one.  You may call the Forms Department with any questions at [phone].  Their fax number is [phone].
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
## 2024-10-31 DIAGNOSIS — M62.838 MUSCLE SPASM: ICD-10-CM

## 2024-10-31 DIAGNOSIS — F41.9 ANXIETY: ICD-10-CM

## 2024-11-01 RX ORDER — DIAZEPAM 5 MG/1
5 TABLET ORAL EVERY 12 HOURS PRN
Qty: 60 TABLET | Refills: 2 | Status: SHIPPED | OUTPATIENT
Start: 2024-11-01

## 2024-11-01 NOTE — TELEPHONE ENCOUNTER
Requested Prescriptions     Pending Prescriptions Disp Refills    diazePAM 5 MG Oral Tab 60 tablet 2     Sig: Take 1 tablet (5 mg total) by mouth every 12 (twelve) hours as needed for Anxiety (spasm).          Last OV: 10/23/2024 with a RTC in about 6-9 months   Next OV: 4/23/25    [place]/;  diazePAM     Dispensed Written Strength Quantity Refills Days Supply Provider Pharmacy   DIAZEPAM 10/05/2024 08/07/2024 5 mg 60  30 MUBILL TANG MD [facility]    DIAZEPAM 09/06/2024 08/07/2024 5 mg 60  30 MUQTABILL SHIRLEY MD [facility]    DIAZEPAM 08/07/2024 08/07/2024 5 mg 60  30 MUQTADABILL AKINS MD [facility]        Last office visit plan;  10/23/2024  ASSESSMENT/PLAN:      (G44.329) Chronic post-traumatic headache, not intractable        History of chronic post traumatic headache and now superimposed stress headaches and insomnia     1. Increase dose of Amitriptyline to 100 mg daily (aware of potential interaction with Mirtazipine and tolerating fine)  2. Continue Fioricet prn ( limit 2-3 per week)  3. On Valium chronically for spasms and anxiety- unable to wean off     Advise to see a therapist for stress management         RTC in about 6-9 months

## 2024-11-12 ENCOUNTER — OFFICE VISIT (OUTPATIENT)
Dept: NEPHROLOGY | Facility: CLINIC | Age: 59
End: 2024-11-12

## 2024-11-12 VITALS
DIASTOLIC BLOOD PRESSURE: 84 MMHG | WEIGHT: 227 LBS | HEART RATE: 75 BPM | BODY MASS INDEX: 39 KG/M2 | SYSTOLIC BLOOD PRESSURE: 119 MMHG

## 2024-11-12 DIAGNOSIS — N18.31 STAGE 3A CHRONIC KIDNEY DISEASE (HCC): Primary | ICD-10-CM

## 2024-11-12 PROCEDURE — 99214 OFFICE O/P EST MOD 30 MIN: CPT | Performed by: INTERNAL MEDICINE

## 2024-11-12 NOTE — PATIENT INSTRUCTIONS
My findings and recommendations are based on patient's symptoms, eye exam, diagnostic testing, and records. Please do your kidney blood and urine test now.  Orders are in the computer.

## 2024-11-28 DIAGNOSIS — G44.329 CHRONIC POST-TRAUMATIC HEADACHE, NOT INTRACTABLE: ICD-10-CM

## 2024-11-29 RX ORDER — BUTALBITAL, ACETAMINOPHEN AND CAFFEINE 50; 325; 40 MG/1; MG/1; MG/1
1 TABLET ORAL 2 TIMES DAILY PRN
Qty: 60 TABLET | Refills: 1 | Status: SHIPPED | OUTPATIENT
Start: 2024-11-29

## 2024-11-29 NOTE — TELEPHONE ENCOUNTER
Requested Prescriptions     Pending Prescriptions Disp Refills    butalbital-acetaminophen-caffeine -40 MG Oral Tab 60 tablet 1     Sig: Take 1 tablet by mouth 2 (two) times daily as needed.       Last OV: 10/23/2024  Next OV: Next Appt: With Neurology (Belia Cleaning MD) 04/23/2025 at 9:00 AM    [place]/;  Butalbital-APAP-Caffeine     Dispensed Written Strength Quantity Refills Days Supply Provider Pharmacy   BUTAL/ACETAMN/CF -40 TAB 11/08/2024 10/07/2024  60 each  30 Belia Cleaning MD [facility] ...   BUTAL/ACETAMN/CF -40 TAB 10/10/2024 10/07/2024  60 each  30 Belia Cleaning MD [facility] ...   BUTAL/ACETAMN/CF -40 TAB 09/12/2024 09/06/2024  60 each  30 Belia Cleaning MD [facility] ...   BUTAL/ACETAMN/CF -40 TAB 08/14/2024 08/07/2024  60 each  30 Belia Cleaning MD [facility] ...     Last office visit plan;   ASSESSMENT/PLAN:      (G44.329) Chronic post-traumatic headache, not intractable        History of chronic post traumatic headache and now superimposed stress headaches and insomnia     1. Increase dose of Amitriptyline to 100 mg daily (aware of potential interaction with Mirtazipine and tolerating fine)  2. Continue Fioricet prn ( limit 2-3 per week)  3. On Valium chronically for spasms and anxiety- unable to wean off

## 2024-12-02 ENCOUNTER — TELEPHONE (OUTPATIENT)
Dept: INTERNAL MEDICINE CLINIC | Facility: CLINIC | Age: 59
End: 2024-12-02

## 2024-12-02 RX ORDER — MIRTAZAPINE 15 MG/1
15 TABLET, FILM COATED ORAL NIGHTLY
Qty: 90 TABLET | Refills: 0 | OUTPATIENT
Start: 2024-12-02

## 2024-12-02 RX ORDER — MIRTAZAPINE 15 MG/1
15 TABLET, FILM COATED ORAL NIGHTLY
Qty: 90 TABLET | Refills: 1 | Status: SHIPPED | OUTPATIENT
Start: 2024-12-02

## 2024-12-02 NOTE — TELEPHONE ENCOUNTER
Patient requesting refill, requested on 11/28 patient completely out     [facility] [address] [place] [address] [address]        Medication Detail    Medication Quantity Refills Start End   mirtazapine 15 MG Oral Tab 90 tablet 1 6/7/2024 --   Sig:   Take 1 tablet (15 mg total) by mouth nightly.     Route:   Oral     Order #:   [id number]

## 2024-12-02 NOTE — TELEPHONE ENCOUNTER
Duplicate request, previously or already or will be addressed.  Patient and pharmacy sent refill requests. Will send to Dr. Little for urgent review.    Prescription was requested on 11/28/24 (office was closed) - we ask for patient's to allow 2-3 business days for refills on medications.

## 2024-12-02 NOTE — TELEPHONE ENCOUNTER
Dr. Little, please kindly review. This medication has no protocol attached.  Medication request is marked high priority    No future appointments with family medicine/internal medicine.  Last office visit: 10/10/24    Requested Prescriptions   Pending Prescriptions Disp Refills    mirtazapine 15 MG Oral Tab 90 tablet 1     Sig: Take 1 tablet (15 mg total) by mouth nightly.       There is no refill protocol information for this order          Future Appointments         Provider Department Appt Notes    In 1 week Liza Marcano APRN [facility]     In 4 months Belia Cleaning MD [facility] 6 month f/u Chronic post-traumatic headache, not intractable G44.329          Recent Outpatient Visits              2 weeks ago Stage 3a chronic kidney disease (HCC)    [facility], [place] Ajit West MD    Office Visit    1 month ago [facility], not intractable    [facility]    [facility] Visit    1 month ago Annual physical exam    [facility] Krish Little MD    Office Visit    5 months ago High serum bone-specific alkaline phosphatase    [facility], [place] Debi Servin MD    Office Visit    7 months ago Chronic post-traumatic headache, not intractable    [facility]    Office Visit

## 2024-12-12 ENCOUNTER — OFFICE VISIT (OUTPATIENT)
Dept: SURGERY | Facility: CLINIC | Age: 59
End: 2024-12-12
Payer: MEDICAID

## 2024-12-12 VITALS
DIASTOLIC BLOOD PRESSURE: 71 MMHG | WEIGHT: 230 LBS | OXYGEN SATURATION: 97 % | SYSTOLIC BLOOD PRESSURE: 94 MMHG | RESPIRATION RATE: 18 BRPM | BODY MASS INDEX: 39 KG/M2 | HEART RATE: 78 BPM | TEMPERATURE: 97 F

## 2024-12-12 DIAGNOSIS — N60.91 ATYPICAL DUCTAL HYPERPLASIA OF RIGHT BREAST: Primary | ICD-10-CM

## 2024-12-12 PROCEDURE — 99214 OFFICE O/P EST MOD 30 MIN: CPT

## 2024-12-16 NOTE — PROGRESS NOTES
Breast Surgery Surveillance Visit    Diagnosis: Atypical ductal hyperplasia, right breast, s/p excisional biopsy on 6/17/2022    Stage: N/a    Disease Status:  Surgical treatment complete, chemoprevention counseling and high risk surveillance pending.    History of Present Illness:   Ms. Lauren Weston is a 59 year old woman who presents with imaging detected right breast calcifications.  The patient denies any palpable masses, nipple discharge, skin changes or axillary symptoms.  She does have a remote history of a benign right breast biopsy.  She has no known family history of breast cancer.  She presented for her screening mammogram in November 2021 and was found to have groups of calcifications in the right breast which additional imaging was recommended.  Her diagnostic evaluation on March 17, 2022 confirmed a 6 mm group of calcifications at the 10 o'clock position of the right breast as well as a indeterminate group of calcifications measuring 7 mm in the lower inner right breast at 4:00 for which 2 site stereotactic biopsy was recommended.  This took place on April 12, 2022 and she was found at the 4 o'clock position to have at least atypical ductal hyperplasia associate with calcifications in the right breast at 10:00 was found to have a columnar cell alteration. She underwent excisional biopsy, which occurred without complication. Most recent imaging was a bilateral diagnostic mammogram on 10/19/2023 which showed calcifications in the right breast anterior to an area of previous biopsy. A 6 month follow up mammogram was recommended but did not take place.  She is here today for evaluation and recommendations for further therapy.        Past Medical History:    Back pain    Heart murmur    High blood pressure    Hx of head injury    Hx of migraine headaches    Hyperlipidemia    Seizure disorder (HCC)    Brain spasms    Spasm    Chronic nerve spasms    TBI (traumatic brain injury) ([facility])    Unspecified  essential hypertension    Visual impairment    glasses       Past Surgical History:   Procedure Laterality Date    Cholecystectomy      Colonoscopy N/A 2017    Procedure: COLONOSCOPY;  Surgeon: Duane Almazan MD;  Location: [facility] ENDOSCOPY    Electrocardiogram, complete  2012    SCANNED TO MEDIA TAB: 2012    Hysterectomy  2007    partial    Wil biopsy stereo nodule 1 site right (cpt=19081) Right 2022    ADH    Wil localization wire 1 site right (cpt=19281) Right 2022    Usual ductal hyperplasia    Other Left     rempved mass on hip    Tubal ligation Bilateral        Gynecological History:  Pt is a   Pt was 24 years old at time of first pregnancy.    She denies any cumulative breastfeeding history.  She achieved menarche at age 14 and LMP   Age of Menopause: 40  Type: hysterectomy   She denies any history of hormone replacement therapy   She denies any history of oral contraceptive use   She denies infertility treatment to achieve pregnancy.    Medications:     mirtazapine 15 MG Oral Tab Take 1 tablet (15 mg total) by mouth nightly. 90 tablet 1    butalbital-acetaminophen-caffeine -40 MG Oral Tab Take 1 tablet by mouth 2 (two) times daily as needed. 60 tablet 1    diazePAM 5 MG Oral Tab Take 1 tablet (5 mg total) by mouth every 12 (twelve) hours as needed for Anxiety (spasm). 60 tablet 2    topiramate 50 MG Oral Tab Take 1 tablet (50 mg total) by mouth every evening. 30 tablet 2    ezetimibe (ZETIA) 10 MG Oral Tab Take 1 tablet (10 mg total) by mouth daily. 90 tablet 0    atorvastatin 40 MG Oral Tab Take 1 tablet (40 mg total) by mouth nightly. 90 tablet 3    lisinopril 10 MG Oral Tab Take 1 tablet (10 mg total) by mouth 2 (two) times daily. 180 tablet 3    amLODIPine 10 MG Oral Tab Take 1 tablet (10 mg total) by mouth nightly. 90 tablet 3    gabapentin 300 MG Oral Cap Take 1 capsule (300 mg total) by mouth 4 (four) times daily. 360 capsule 3    fluticasone propionate 50  MCG/ACT Nasal Suspension 2 sprays by Each Nare route daily. 1 each 2    Albuterol Sulfate  (90 Base) MCG/ACT Inhalation Aero Soln Inhale 2 puffs into the lungs every 6 (six) hours as needed for Wheezing. 1 each 0       Allergies:    Allergies   Allergen Reactions    Baby Oil RASH and RESPIRATORY FAILURE    Clemizole ANAPHYLAXIS    [place] RESPIRATORY FAILURE    Orange Juice [Orange Oil] RESPIRATORY FAILURE    Banana NAUSEA AND VOMITING    Penicillins ANAPHYLAXIS       Family History:   Family History   Problem Relation Age of Onset    Heart Disorder Father         Bypass x 3, heroin addict, kidney stones    No Known Problems Mother     No Known Problems Sister        She is not of Ashkenazi Hinduism ancestry.    Social History:  History   Alcohol Use No       History   Smoking Status    Every Day    Types: Cigarettes   Smokeless Tobacco    Never     Ms. Lauren Weston is  with 2 children. She has 3 siblings. She is currently Homemaker    Review of Systems:  General:   The patient denies, fever, chills, night sweats, fatigue, generalized weakness, change in appetite or weight loss.    HEENT:     The patient denies eye irritation, cataracts, redness, glaucoma, yellowing of the eyes, change in vision, color blindness, or wearing +contacts/glasses. The patient denies hearing loss, ringing in the ears, ear drainage, earaches, nasal congestion, nose bleeds, snoring, pain in mouth/throat, hoarseness, change in voice, facial trauma.    Respiratory:  The patient denies chronic cough, phlegm, hemoptysis, pleurisy/chest pain, pneumonia, asthma, +wheezing, difficulty in breathing with exertion, emphysema, chronic bronchitis, +shortness of breath or abnormal sound when breathing.     Cardiovascular:  There is no history of chest pain, chest pressure/discomfort, palpitations, irregular heartbeat, fainting or near-fainting, difficulty breathing when lying flat, SOB/Coughing at night, swelling of the legs or chest  pain while walking.    Breasts:  See history of present illness    Gastrointestinal:     There is no history of difficulty or pain with swallowing, reflux symptoms, vomiting, dark or bloody stools, constipation, yellowing of the skin, indigestion, nausea, change in bowel habits, diarrhea, abdominal pain or vomiting blood.     Genitourinary:  The patient denies +frequent urination, needing to get up at night to urinate, urinary hesitancy or retaining urine, painful urination, urinary incontinence, decreased urine stream, blood in the urine or vaginal/penile discharge.    Skin:    The patient denies rash, itching, skin lesions, dry skin, change in skin color or change in moles.     Hematologic/Lymphatic:  The patient denies +easily bruising or bleeding or persistent swollen glands or lymph nodes.     Musculoskeletal:  The patient denies muscle aches/pain, joint pain, stiff joints, neck pain, back pain or bone pain.    Neuropsychiatric:  There is no history of migraines or severe headaches, +seizure/epilepsy, speech problems, coordination problems, trembling/tremors, +fainting/black outs, +dizziness, memory problems, loss of sensation/numbness, problems walking, weakness, tingling or burning in hands/feet. There is no history of abusive relationship, bipolar disorder, sleep disturbance, anxiety, depression or feeling of despair.    Endocrine:    There is no history of poor/slow wound healing, weight loss/gain, fertility or hormone problems, cold intolerance, thyroid disease.     Allergic/Immunologic:  There is no history of hives, hay fever, angioedema or anaphylaxis.    BP 94/71 (BP Location: Right arm, Patient Position: Sitting, Cuff Size: large)   Pulse 78   Temp 97 °F (36.1 °C) (Temporal)   Resp 18   Wt 104.3 kg (230 lb)   SpO2 97%   BMI 39.48 kg/m²     Physical Exam:  The patient is an alert, oriented, well-nourished and  well-developed woman who appears her stated age. Her speech patterns and movements are  normal. Her affect is appropriate.    HEENT: The head is normocephalic. The neck is supple. The thyroid is not enlarged and is without palpable masses/nodules. There are no palpable masses. The trachea is in the midline. Conjunctiva are clear, non-icteric.    Chest: The chest expands symmetrically. The lungs are clear to auscultation.    Heart: The rhythm is regular.  There are no murmurs, rubs, gallops or thrills.    Breasts:  Her breasts are symmetrical with a cup size 36DDD.  Right breast:  Well healing incision with no signs of infection.    The skin, nipple ,and areola appear normal. There is no skin dimpling with movement of the pectoralis. There is no nipple retraction. No nipple discharge can be elicited. The parenchyma is mildly nodular. There are no dominant masses in the breast. The axillary tail is normal.  Left breast:   The skin, nipple, and areola appear normal. There is no skin dimpling with movement of the pectoralis. There is no nipple retraction. No nipple discharge can be elicited. The parenchyma is mildly nodular. There are no dominant masses in the breast. The axillary tail is normal.    Abdomen:  The abdomen is soft, flat and non tender. The liver is not enlarged. There are no palpable masses.    Lymph Nodes:  The supraclavicular, axillary and cervical regions are free of significant lymphadenopathy.    Back: There is no vertebral column tenderness.    Skin: The skin appears normal. There are no suspicious appearing rashes or lesions.    Extremities: The extremities are without deformity, cyanosis or edema.    Impression:   Ms. Lauren Weston is a 59 year old woman presents with image detected right breast calcifications associated with biopsy confirmed atypical ductal hyperplasia, s/p excisional biopsy.     Recommendations:   I had a discussion with the Patient regarding her breast exam.  She has healed well since surgery.  We discussed the significance of locations of atypical hyperplasia  including her elevated future risk of breast cancer.  We discussed the role of high risk surveillance and we briefly discussed the role of chemoprevention for risk reduction.  Given her other medications and comorbidities she does not wish to proceed with any chemoprevention at this time.  She is due for a bilateral diagnostic mammogram now. I will be in touch with her with the results of her imaging once they become available. She was given ample opportunity for questions and those questions were answered to her satisfaction. She was encouraged to contact the office with any questions or concerns prior to her next scheduled appointment.

## 2024-12-23 ENCOUNTER — TELEPHONE (OUTPATIENT)
Dept: NEUROLOGY | Facility: CLINIC | Age: 59
End: 2024-12-23

## 2024-12-23 NOTE — TELEPHONE ENCOUNTER
Pt and [employer] Pt needs a vacation override for butalbital-acetaminophen-caffeine -40 MG Oral Tab, butalbital-acetaminophen-caffeine -40 MG Oral Tab. Pharmacy needs our authorization to reject in the system so [employer] and sumbit overide. Please advise, Pt's best cb # [phone]. Endorsed to RN.

## 2024-12-23 NOTE — TELEPHONE ENCOUNTER
Per Epic review patient has received 60 tablets monthly since July.  Dispensed on 12/7/24 and patient is requesting an early refill due to vacation.     Per LOV patient should limit to 2-3 x per week.

## 2024-12-24 NOTE — TELEPHONE ENCOUNTER
Left message for patient advising the pharmacist is unable to early fill since it is 2 week before due.     Medication last picked up on 12/7/24    Currently getting 60 tablets in 30 days.

## 2024-12-24 NOTE — TELEPHONE ENCOUNTER
Spoke to patient and advised that the pharmacy is questioning the 2 week early refill.     Advised can not guarantee that insurance will cover. She states the insurance already told her they would pay for it under the vacation override.    Advised there is a prescription for a refill left at the pharmacy.    Advised after this refill we may not be able to send F[place]icet [place].

## 2025-01-09 RX ORDER — EZETIMIBE 10 MG/1
10 TABLET ORAL DAILY
Qty: 90 TABLET | Refills: 0 | Status: SHIPPED | OUTPATIENT
Start: 2025-01-09

## 2025-01-09 NOTE — TELEPHONE ENCOUNTER
[email or web address] message with recommendation sent to patient, due for labs.       Refill Passed Per Protocol    Requested Prescriptions   Pending Prescriptions Disp Refills    EZETIMIBE 10 MG Oral Tab [Pharmacy Med Name: Ezetimibe 10 MG Oral Tablet] 90 tablet 0     Sig: Take 1 tablet by mouth once daily       Cholesterol Medication Protocol Passed - 1/9/2025 10:21 AM        Passed - ALT < 80     Lab Results   Component Value Date    ALT 13 10/10/2024             Passed - ALT resulted within past year        Passed - Lipid panel within past 12 months     Lab Results   Component Value Date    CHOLEST 235 (H) 10/10/2024    TRIG 89 10/10/2024    HDL 49 10/10/2024     (H) 10/10/2024    VLDL 18 10/10/2024    NONHDLC 186 (H) 10/10/2024             Passed - In person appointment or virtual visit in the past 12 mos or appointment in next 3 mos     Recent Outpatient Visits              4 weeks ago Atypical ductal hyperplasia of right breast    [facility] Liza Marcano APRN    Office Visit    1 month ago Stage 3a chronic kidney disease (HCC)    [facility] [place] Ajit West MD    Office Visit    2 months ago Chronic post-traumatic headache, not intractable    [facility] Belia Cleaning MD    Office Visit    3 months ago Annual physical exam    [facility] Krish Little MD    Office Visit    6 months ago High serum bone-specific alkaline phosphatase    [facility] [place] Debi Servin MD    Office Visit          Future Appointments         Provider Department Appt Notes    In 3 months Belia Cleaning MD [facility] 6 month f/u Chronic post-traumatic headache, not intractable G44.329                    Passed - Medication is active on med list              Future Appointments         Provider Department Appt Notes    In 3 months Belia Cleaning MD [facility] 6 month f/u Chronic post-traumatic headache, not intractable G44.329          Recent Outpatient Visits              4 weeks ago Atypical ductal hyperplasia of right breast    [facility] Liza Marcano APRN    Office Visit    1 month ago Stage 3a chronic kidney disease (HCC)    [facility] Ajit Nieves MD    Office Visit    2 months ago Chronic post-traumatic headache, not intractable    [facility] Belia Cleaning MD    Office Visit    3 months ago Annual physical exam    [facility] Krish Andersen MD    Office Visit    6 months ago High serum bone-specific alkaline phosphatase    [facility] [place] Debi Servin MD    Office Visit

## 2025-01-18 DIAGNOSIS — G44.329 CHRONIC POST-TRAUMATIC HEADACHE, NOT INTRACTABLE: Primary | ICD-10-CM

## 2025-01-20 RX ORDER — TOPIRAMATE 50 MG/1
50 TABLET, FILM COATED ORAL EVERY EVENING
Qty: 30 TABLET | Refills: 2 | Status: SHIPPED | OUTPATIENT
Start: 2025-01-20

## 2025-01-20 NOTE — TELEPHONE ENCOUNTER
Medication: topiramate 50 MG Oral Tab      Date of last refill: 10/23/2024 (#30/2)  [place]     Last office visit: 10/23/2024  Due back to clinic per last office note:  Around 04/23/2025  Date next office visit scheduled:    Future Appointments   Date Time Provider Department Center   4/23/2025  9:00 AM Belia Cleaning MD [facility] [place]           Last OV note recommendation:    ASSESSMENT/PLAN:      (G44.329) Chronic post-traumatic headache, not intractable        History of chronic post traumatic headache and now superimposed stress headaches and insomnia     1. Increase dose of Amitriptyline to 100 mg daily (aware of potential interaction with Mirtazipine and tolerating fine)  2. Continue Fioricet prn ( limit 2-3 per week)  3. On Valium chronically for spasms and anxiety- unable to wean off     Advise to see a therapist for stress management         RTC in about 6-9 months     See orders and medications filed with this encounter. The patient indicates understanding of these issues and agrees with the plan.

## 2025-01-26 DIAGNOSIS — G44.329 CHRONIC POST-TRAUMATIC HEADACHE, NOT INTRACTABLE: ICD-10-CM

## 2025-01-26 DIAGNOSIS — M62.838 MUSCLE SPASM: ICD-10-CM

## 2025-01-26 DIAGNOSIS — F41.9 ANXIETY: ICD-10-CM

## 2025-01-27 RX ORDER — DIAZEPAM 5 MG/1
5 TABLET ORAL EVERY 12 HOURS PRN
Qty: 60 TABLET | Refills: 2 | Status: SHIPPED | OUTPATIENT
Start: 2025-01-27

## 2025-01-27 RX ORDER — BUTALBITAL, ACETAMINOPHEN AND CAFFEINE 50; 325; 40 MG/1; MG/1; MG/1
1 TABLET ORAL 2 TIMES DAILY PRN
Qty: 60 TABLET | Refills: 1 | Status: SHIPPED | OUTPATIENT
Start: 2025-01-27

## 2025-01-27 NOTE — TELEPHONE ENCOUNTER
Requested Prescriptions     Pending Prescriptions Disp Refills    diazePAM 5 MG Oral Tab 60 tablet 2     Sig: Take 1 tablet (5 mg total) by mouth every 12 (twelve) hours as needed for Anxiety (spasm).    butalbital-acetaminophen-caffeine -40 MG Oral Tab 60 tablet 1     Sig: Take 1 tablet by mouth 2 (two) times daily as needed.       Last OV: 10/23/2024    [place]AM     Dispensed Written Strength Quantity Refills Days Supply Provider Pharmacy   DIAZEPAM 12/30/2024 11/01/2024 5 mg 60  30 MUQTADAR, BILL RANGEL [facility]    DIAZEPAM 11/30/2024 11/01/2024 5 mg 60  30 MUQTADAR, BILL RANG[facility]    DIAZEPAM 11/02/2024 11/01/2024 5 mg 60  30 MUQTADAR, BILL RANGEL [facility]    DIAZEPAM 10/05/2024 08/07/2024 5 mg 60  30 MUQTADAR, BILL RANGEL [facility]    DIAZEPAM 09/06/2024 08/07/2024 5 mg 60  30 MUQTADAR, BILL RANGEL [facility]        Butalbital-APAP-Caffeine     Dispensed Written Strength Quantity Refills Days Supply Provider Pharmacy   BUTALBITAL-ACETAMINOPHEN-CAFFEINE 01/05/2025 11/29/2024 40 mg 60  30 MUQTADAR, BILL RANGEL [facility]    BUTALBITAL-ACETAMINOPHEN-CAFFEINE 12/07/2024 11/29/2024 40 mg 60  30 MUQTADAR, BILL RANGEL [facility]    BUTALBITAL-ACETAMINOPHEN-CAFFEINE 11/08/2024 10/07/2024 40 mg 60  30 MUQTADAR, BILL RANGEL [facility]    BUTALBITAL-ACETAMINOPHEN-CAFFEINE 10/10/2024 10/07/2024 325-50-40 60  30 MUQTADABILL AKINS MD [facility]    BUTALBITAL-ACETAMINOPHEN-CAFFEINE 09/12/2024 09/06/2024 325-50-40 60  30 MUQTADABILL AKINS MD [facility]        Last office visit plan;    ASSESSMENT/PLAN:      (G44.995) Chronic post-traumatic headache, not intractable        History of chronic post traumatic headache and now superimposed stress headaches and insomnia     1. Increase dose of Amitriptyline to 100 mg daily (aware of potential interaction with  Mirtazipine and tolerating fine)  2. Continue Fioricet prn ( limit 2-3 per week)  3. On Valium chronically for spasms and anxiety- unable to wean off     Advise to see a therapist for stress management         RTC in about 6-9 months     See orders and medications filed with this encounter. The patient indicates understanding of these issues and agrees with the plan.           Belia Cleaning MD  [facility]     Meds This Visit:  Requested Prescriptions        No prescriptions requested or ordered in this encounter         Imaging & Referrals:  None      ID#[id number]        Other Notes    All notes  Instructions  Return in about 6 months (around 4/23/2025).

## 2025-01-29 RX ORDER — EZETIMIBE 10 MG/1
10 TABLET ORAL DAILY
Qty: 90 TABLET | Refills: 0 | OUTPATIENT
Start: 2025-01-29

## 2025-01-29 NOTE — TELEPHONE ENCOUNTER
Per Lab note from 10/13/2024:    Please notify patient  Cholesterol levels remain elevated.  She is taking her atorvastatin 40 mg daily.  Will add Zetia 10 mg daily #90 and then repeat cholesterol panel and chemistry panel in 3 months.    Ezetimibe 10 mg : was sent to [facility] in [place] for 3 month supply on 01/09/2025, patient is due for blood work for further refills.

## 2025-02-03 ENCOUNTER — TELEPHONE (OUTPATIENT)
Age: 60
End: 2025-02-03

## 2025-02-03 NOTE — TELEPHONE ENCOUNTER
New consult for abnormal serum protein electrophoresis. Patient is referred by Dr. Little to see Dr. Escoto. [facility].  Patient saw Dr. Odonnell for a consult 6/2023. No follow-up appointments.

## 2025-02-11 ENCOUNTER — OFFICE VISIT (OUTPATIENT)
Age: 60
End: 2025-02-11
Attending: STUDENT IN AN ORGANIZED HEALTH CARE EDUCATION/TRAINING PROGRAM
Payer: MEDICAID

## 2025-02-11 VITALS
SYSTOLIC BLOOD PRESSURE: 132 MMHG | DIASTOLIC BLOOD PRESSURE: 84 MMHG | HEART RATE: 71 BPM | WEIGHT: 225 LBS | OXYGEN SATURATION: 99 % | BODY MASS INDEX: 39.87 KG/M2 | TEMPERATURE: 98 F | HEIGHT: 63 IN | RESPIRATION RATE: 18 BRPM

## 2025-02-11 DIAGNOSIS — N18.30 STAGE 3 CHRONIC KIDNEY DISEASE, UNSPECIFIED WHETHER STAGE 3A OR 3B CKD (HCC): ICD-10-CM

## 2025-02-11 DIAGNOSIS — R76.8 ELEVATED SERUM IMMUNOGLOBULIN FREE LIGHT CHAIN LEVEL: Primary | ICD-10-CM

## 2025-02-11 NOTE — PROGRESS NOTES
Hematology Oncology Progress Note     REQUESTING PHYSICIAN:  Krish Little MD     REASON FOR CONSULTATION: Elevated Kappa light chain and FLC ratio    Hematology History    The patient had a total protein level of 8.6 on labs 02/01/2023.   2023: Elevated Kappa light chains, FLC Of 1.90 in the setting of CKD  2024: Elevated Kappa light chains, FLC of 1.92 in the setting of CKD.     No monoclonal protein noted   No anemia, renal function at baseline.  No hypercalcemia.    HPI:     Denies any having any constitutional symptoms. No neuropathy. Complains of pain in the elbow on the right.     Patient is overwhelmed with the loss of her sister and her mother illness.     PAST MEDICAL HISTORY:  Hyperlipidemia, chronic kidney disease, vitamin D deficiency, renal cysts.    MEDICATIONS:  Diazepam, lisinopril, gabapentin, amitriptyline, mirtazapine, lovastatin, amlodipine, albuterol.    ALLERGIES:  Penicillins.    SOCIAL HISTORY:  She smokes a couple cigarettes a day.  Denies any significant alcohol use.  Denies any illicit drug use.    FAMILY MEDICAL HISTORY:  No history of any hematologic malignancy in the family, specifically mother and father.      REVIEW OF SYSTEMS:  All other systems reviewed, negative x12.       PHYSICAL EXAMINATION:    GENERAL:  No acute distress.  Alert and oriented.   VITAL SIGNS:  ECOG performance status is 0.  Blood pressure is 116/86, pulse 90, respiratory rate 16, temperature 37.1 Celsius.  HEENT:  Moist mucous membranes.  Oropharynx clear.   NECK:  Supple.  LUNGS:  Symmetric expansion.    HEART:  Good distal pulses.    ABDOMEN:  Soft.  EXTREMITIES:  No edema.  SKIN:  No visible lesions.    LYMPHATICS:  No visible adenopathy.   NEUROLOGIC:  Moving all extremities.    PSYCHIATRIC:  Appropriate mood, appropriate affect.   MUSCULOSKELETAL:  No deformity.         Impression and Plan       59 year old female with CKD and elevated FLC ratio. No monoclonal protein     No signs of organ damage.          The elevation is likely related to renal dysfunction.     We will repeat protein electrphoresis and if noted to have any abnormality, we will continue to monitor.     FLCs are primarily removed by the kidneys and thus kidney function is a major determinant of the serum levels of both kappa and lambda FLCs.  Kappa FLCs are formed at approximately twice the rate of lambda FLC in a normal state . Lambda FLCs are more prone to form dimers, increasing their molecular weight and slowing their kidney clearance. With decreasing kidney function, the serum half life of FLCs lengthens which increases the levels of FLCs despite the absence of a pathological plasma cell disorder      Thank you for allowing me to participate in the care of Ms. Weston.     Nirmala Murphy MD  Hematology Oncology

## 2025-02-26 ENCOUNTER — HOSPITAL ENCOUNTER (OUTPATIENT)
Dept: MAMMOGRAPHY | Facility: HOSPITAL | Age: 60
Discharge: HOME | End: 2025-02-26
Attending: INTERNAL MEDICINE
Payer: MEDICAID

## 2025-02-26 DIAGNOSIS — R92.8 ABNORMAL MAMMOGRAM: ICD-10-CM

## 2025-02-26 PROCEDURE — 77062 BREAST TOMOSYNTHESIS BI: CPT | Performed by: INTERNAL MEDICINE

## 2025-02-26 PROCEDURE — 76642 ULTRASOUND BREAST LIMITED: CPT | Performed by: INTERNAL MEDICINE

## 2025-02-26 PROCEDURE — 77066 DX MAMMO INCL CAD BI: CPT | Performed by: INTERNAL MEDICINE

## 2025-02-27 RX ORDER — ATORVASTATIN CALCIUM 40 MG/1
40 TABLET, FILM COATED ORAL NIGHTLY
Qty: 90 TABLET | Refills: 3 | Status: SHIPPED | OUTPATIENT
Start: 2025-02-27

## 2025-02-27 RX ORDER — MIRTAZAPINE 15 MG/1
15 TABLET, FILM COATED ORAL NIGHTLY
Qty: 90 TABLET | Refills: 3 | OUTPATIENT
Start: 2025-02-27

## 2025-02-27 NOTE — TELEPHONE ENCOUNTER
Please review; protocol failed/ has no protocol        Kulwinder Ardon30 minutes ago (11:47 AM)     RM  Patient is out of medications. Please advise

## 2025-03-02 ENCOUNTER — TELEPHONE (OUTPATIENT)
Dept: INTERNAL MEDICINE CLINIC | Facility: CLINIC | Age: 60
End: 2025-03-02

## 2025-03-02 DIAGNOSIS — R92.8 ABNORMAL MAMMOGRAM: Primary | ICD-10-CM

## 2025-03-06 ENCOUNTER — LAB ENCOUNTER (OUTPATIENT)
Dept: LAB | Age: 60
End: 2025-03-06
Attending: STUDENT IN AN ORGANIZED HEALTH CARE EDUCATION/TRAINING PROGRAM
Payer: MEDICAID

## 2025-03-06 ENCOUNTER — OFFICE VISIT (OUTPATIENT)
Dept: INTERNAL MEDICINE CLINIC | Facility: CLINIC | Age: 60
End: 2025-03-06

## 2025-03-06 VITALS
HEART RATE: 72 BPM | WEIGHT: 224 LBS | DIASTOLIC BLOOD PRESSURE: 74 MMHG | SYSTOLIC BLOOD PRESSURE: 114 MMHG | BODY MASS INDEX: 40 KG/M2

## 2025-03-06 DIAGNOSIS — M25.521 RIGHT ELBOW PAIN: ICD-10-CM

## 2025-03-06 DIAGNOSIS — E78.2 MIXED HYPERLIPIDEMIA: ICD-10-CM

## 2025-03-06 DIAGNOSIS — N60.91 ATYPICAL DUCTAL HYPERPLASIA OF RIGHT BREAST: ICD-10-CM

## 2025-03-06 DIAGNOSIS — R76.8 ELEVATED SERUM IMMUNOGLOBULIN FREE LIGHT CHAIN LEVEL: ICD-10-CM

## 2025-03-06 DIAGNOSIS — Z86.39 HISTORY OF PINEAL CYST: ICD-10-CM

## 2025-03-06 DIAGNOSIS — I10 PRIMARY HYPERTENSION: Primary | ICD-10-CM

## 2025-03-06 DIAGNOSIS — N18.31 STAGE 3A CHRONIC KIDNEY DISEASE (HCC): ICD-10-CM

## 2025-03-06 DIAGNOSIS — R56.9 SEIZURE (HCC): ICD-10-CM

## 2025-03-06 LAB
ALBUMIN SERPL-MCNC: 4.5 G/DL (ref 3.2–4.8)
ALBUMIN/GLOB SERPL: 1.5 {RATIO} (ref 1–2)
ALP LIVER SERPL-CCNC: 125 U/L
ALT SERPL-CCNC: 14 U/L
ANION GAP SERPL CALC-SCNC: 6 MMOL/L (ref 0–18)
AST SERPL-CCNC: 15 U/L (ref ?–34)
BASOPHILS # BLD AUTO: 0.07 X10(3) UL (ref 0–0.2)
BASOPHILS NFR BLD AUTO: 0.9 %
BILIRUB DIRECT SERPL-MCNC: <0.1 MG/DL (ref ?–0.3)
BILIRUB SERPL-MCNC: 0.2 MG/DL (ref 0.3–1.2)
BILIRUB UR QL: NEGATIVE
BUN BLD-MCNC: 15 MG/DL (ref 9–23)
BUN/CREAT SERPL: 13.4 (ref 10–20)
CALCIUM BLD-MCNC: 9.4 MG/DL (ref 8.7–10.4)
CHLORIDE SERPL-SCNC: 115 MMOL/L (ref 98–112)
CHOLEST SERPL-MCNC: 142 MG/DL (ref ?–200)
CLARITY UR: CLEAR
CO2 SERPL-SCNC: 20 MMOL/L (ref 21–32)
CREAT BLD-MCNC: 1.12 MG/DL
CREAT UR-SCNC: 115.6 MG/DL
DEPRECATED RDW RBC AUTO: 45.9 FL (ref 35.1–46.3)
EGFRCR SERPLBLD CKD-EPI 2021: 57 ML/MIN/1.73M2 (ref 60–?)
EOSINOPHIL # BLD AUTO: 0.23 X10(3) UL (ref 0–0.7)
EOSINOPHIL NFR BLD AUTO: 2.9 %
ERYTHROCYTE [DISTWIDTH] IN BLOOD BY AUTOMATED COUNT: 13.8 % (ref 11–15)
FASTING PATIENT LIPID ANSWER: YES
FASTING STATUS PATIENT QL REPORTED: YES
GLOBULIN PLAS-MCNC: 3 G/DL (ref 2–3.5)
GLUCOSE BLD-MCNC: 101 MG/DL (ref 70–99)
GLUCOSE UR-MCNC: NORMAL MG/DL
HCT VFR BLD AUTO: 43.1 %
HDLC SERPL-MCNC: 36 MG/DL (ref 40–59)
HGB BLD-MCNC: 13.9 G/DL
HGB UR QL STRIP.AUTO: NEGATIVE
IGA SERPL-MCNC: 237.5 MG/DL (ref 40–350)
IGM SERPL-MCNC: 102.6 MG/DL (ref 50–300)
IMM GRANULOCYTES # BLD AUTO: 0.03 X10(3) UL (ref 0–1)
IMM GRANULOCYTES NFR BLD: 0.4 %
IMMUNOGLOBULIN PNL SER-MCNC: 1326 MG/DL (ref 650–1600)
KETONES UR-MCNC: NEGATIVE MG/DL
LDLC SERPL CALC-MCNC: 83 MG/DL (ref ?–100)
LEUKOCYTE ESTERASE UR QL STRIP.AUTO: NEGATIVE
LYMPHOCYTES # BLD AUTO: 4.39 X10(3) UL (ref 1–4)
LYMPHOCYTES NFR BLD AUTO: 55.5 %
MCH RBC QN AUTO: 28.9 PG (ref 26–34)
MCHC RBC AUTO-ENTMCNC: 32.3 G/DL (ref 31–37)
MCV RBC AUTO: 89.6 FL
MICROALBUMIN UR-MCNC: <0.3 MG/DL
MONOCYTES # BLD AUTO: 0.4 X10(3) UL (ref 0.1–1)
MONOCYTES NFR BLD AUTO: 5.1 %
NEUTROPHILS # BLD AUTO: 2.79 X10 (3) UL (ref 1.5–7.7)
NEUTROPHILS # BLD AUTO: 2.79 X10(3) UL (ref 1.5–7.7)
NEUTROPHILS NFR BLD AUTO: 35.2 %
NITRITE UR QL STRIP.AUTO: NEGATIVE
NONHDLC SERPL-MCNC: 106 MG/DL (ref ?–130)
OSMOLALITY SERPL CALC.SUM OF ELEC: 293 MOSM/KG (ref 275–295)
PH UR: 5 [PH] (ref 5–8)
PHOSPHATE SERPL-MCNC: 4.1 MG/DL (ref 2.4–5.1)
PLATELET # BLD AUTO: 272 10(3)UL (ref 150–450)
POTASSIUM SERPL-SCNC: 3.8 MMOL/L (ref 3.5–5.1)
PROT SERPL-MCNC: 7.5 G/DL (ref 5.7–8.2)
PROT UR-MCNC: NEGATIVE MG/DL
RBC # BLD AUTO: 4.81 X10(6)UL
SODIUM SERPL-SCNC: 141 MMOL/L (ref 136–145)
SP GR UR STRIP: 1.02 (ref 1–1.03)
TRIGL SERPL-MCNC: 127 MG/DL (ref 30–149)
UROBILINOGEN UR STRIP-ACNC: NORMAL
VLDLC SERPL CALC-MCNC: 20 MG/DL (ref 0–30)
WBC # BLD AUTO: 7.9 X10(3) UL (ref 4–11)

## 2025-03-06 PROCEDURE — 82570 ASSAY OF URINE CREATININE: CPT

## 2025-03-06 PROCEDURE — 85025 COMPLETE CBC W/AUTO DIFF WBC: CPT

## 2025-03-06 PROCEDURE — 36415 COLL VENOUS BLD VENIPUNCTURE: CPT

## 2025-03-06 PROCEDURE — 86334 IMMUNOFIX E-PHORESIS SERUM: CPT

## 2025-03-06 PROCEDURE — 81003 URINALYSIS AUTO W/O SCOPE: CPT

## 2025-03-06 PROCEDURE — 83521 IG LIGHT CHAINS FREE EACH: CPT

## 2025-03-06 PROCEDURE — 80061 LIPID PANEL: CPT

## 2025-03-06 PROCEDURE — 80053 COMPREHEN METABOLIC PANEL: CPT

## 2025-03-06 PROCEDURE — 84165 PROTEIN E-PHORESIS SERUM: CPT

## 2025-03-06 PROCEDURE — 84100 ASSAY OF PHOSPHORUS: CPT

## 2025-03-06 PROCEDURE — 82784 ASSAY IGA/IGD/IGG/IGM EACH: CPT

## 2025-03-06 PROCEDURE — 99214 OFFICE O/P EST MOD 30 MIN: CPT | Performed by: INTERNAL MEDICINE

## 2025-03-06 PROCEDURE — 82248 BILIRUBIN DIRECT: CPT

## 2025-03-06 PROCEDURE — 82043 UR ALBUMIN QUANTITATIVE: CPT

## 2025-03-06 RX ORDER — ALBUTEROL SULFATE 90 UG/1
2 INHALANT RESPIRATORY (INHALATION) EVERY 6 HOURS PRN
Qty: 1 EACH | Refills: 1 | Status: SHIPPED | OUTPATIENT
Start: 2025-03-06

## 2025-03-06 NOTE — PROGRESS NOTES
Subjective:     Patient ID: Lauren Weston is a 59 year old female.    Hypertension  This is a chronic problem. The current episode started more than 1 year ago. The problem has been gradually improving since onset. The problem is controlled. Pertinent negatives include no chest pain, peripheral edema or shortness of breath. There are no associated agents to hypertension. Risk factors for coronary artery disease include dyslipidemia, obesity, post-menopausal state, sedentary lifestyle and smoking/tobacco exposure. Past treatments include calcium channel blockers and ACE inhibitors. The current treatment provides significant improvement. Compliance problems include exercise.  There is no history of angina, kidney disease, CAD/MI, CVA, heart failure or PVD. There is no history of chronic renal disease or a hypertension causing med.   Hyperlipidemia  This is a chronic problem. The current episode started more than 1 year ago. The problem is uncontrolled. Recent lipid tests were reviewed and are high. Exacerbating diseases include obesity. She has no history of chronic renal disease, diabetes or hypothyroidism. Factors aggravating her hyperlipidemia include smoking. Pertinent negatives include no chest pain or shortness of breath. Current antihyperlipidemic treatment includes diet change and exercise. The current treatment provides mild improvement of lipids. There are no compliance problems.  Risk factors for coronary artery disease include dyslipidemia, hypertension, post-menopausal and a sedentary lifestyle.   Arm Pain   The pain is present in the right arm. This is a chronic problem. The current episode started more than 1 month ago (2mos). There has been no history of extremity trauma. The problem has been waxing and waning. The quality of the pain is described as aching. The pain is severe. Associated symptoms include a limited range of motion and stiffness. Pertinent negatives include no fever or joint swelling.  Exacerbated by: movement right arm. She has tried heat and cold for the symptoms. The treatment provided no relief. There is no history of diabetes.       History/Other:   Review of Systems   Constitutional: Negative.  Negative for fever.   Respiratory: Negative.  Negative for shortness of breath.    Cardiovascular:  Negative for chest pain.   Gastrointestinal: Negative.  Negative for anal bleeding and blood in stool.   Genitourinary: Negative.    Musculoskeletal:  Positive for stiffness. Negative for joint swelling.        Right arm pain   Skin:  Negative for rash.     Current Outpatient Medications   Medication Sig Dispense Refill    atorvastatin 40 MG Oral Tab Take 1 tablet (40 mg total) by mouth nightly. 90 tablet 3    diazePAM 5 MG Oral Tab Take 1 tablet (5 mg total) by mouth every 12 (twelve) hours as needed for Anxiety (spasm). 60 tablet 2    butalbital-acetaminophen-caffeine -40 MG Oral Tab Take 1 tablet by mouth 2 (two) times daily as needed. 60 tablet 1    topiramate 50 MG Oral Tab Take 1 tablet (50 mg total) by mouth every evening. 30 tablet 2    ezetimibe 10 MG Oral Tab Take 1 tablet (10 mg total) by mouth daily. 90 tablet 0    mirtazapine 15 MG Oral Tab Take 1 tablet (15 mg total) by mouth nightly. 90 tablet 1    lisinopril 10 MG Oral Tab Take 1 tablet (10 mg total) by mouth 2 (two) times daily. 180 tablet 3    amLODIPine 10 MG Oral Tab Take 1 tablet (10 mg total) by mouth nightly. 90 tablet 3    gabapentin 300 MG Oral Cap Take 1 capsule (300 mg total) by mouth 4 (four) times daily. 360 capsule 3    fluticasone propionate 50 MCG/ACT Nasal Suspension 2 sprays by Each Nare route daily. 1 each 2    Albuterol Sulfate  (90 Base) MCG/ACT Inhalation Aero Soln Inhale 2 puffs into the lungs every 6 (six) hours as needed for Wheezing. 1 each 0     Allergies:Allergies[1]    Past Medical History:    Back pain    Heart murmur    High blood pressure    Hx of head injury    Hx of migraine headaches     Hyperlipidemia    Seizure disorder (HCC)    Brain spasms    Spasm    Chronic nerve spasms    TBI (traumatic brain injury) (HCC)    Unspecified essential hypertension    Visual impairment    glasses      Past Surgical History:   Procedure Laterality Date    Cholecystectomy  1999    Colonoscopy N/A 08/17/2017    Procedure: COLONOSCOPY;  Surgeon: Duane Almazan MD;  Location: [facility] ENDOSCOPY    Electrocardiogram, complete  04/16/2012    SCANNED TO MEDIA TAB: 04-    Hysterectomy  2007    partial    Wil biopsy stereo nodule 1 site right (cpt=19081) Right 04/2022    ADH    Wil localization wire 1 site right (cpt=19281) Right 06/2022    Usual ductal hyperplasia    Other Left     rempved mass on hip    Tubal ligation Bilateral 1990      Family History   Problem Relation Age of Onset    Heart Disorder Father         Bypass x 3, heroin addict, kidney stones    No Known Problems Mother     No Known Problems Sister       Social History:   Social History     Socioeconomic History    Marital status:     Number of children: 2   Occupational History    Occupation: Unemployed   Tobacco Use    Smoking status: Every Day     Current packs/day: 0.25     Average packs/day: 0.3 packs/day for 21.0 years (5.3 ttl pk-yrs)     Types: Cigarettes     Passive exposure: Current    Smokeless tobacco: Never    Tobacco comments:     smokes only 3-4 sticks /day   Vaping Use    Vaping status: Never Used   Substance and Sexual Activity    Alcohol use: No     Alcohol/week: 0.0 standard drinks of alcohol    Drug use: Yes     Frequency: 7.0 times per week     Types: Cannabis     Comment: daily    Other Topics Concern    Caffeine Concern No    Exercise No     Comment: Daily   Social History Narrative    The patient does not use an assistive device..      The patient does not live in a home with stairs.     Social Drivers of Health      Received from [facility]    Housing Stability        Objective:   Physical  Exam  Constitutional:       General: She is not in acute distress.     Appearance: She is well-developed. She is obese. She is not ill-appearing, toxic-appearing or diaphoretic.   HENT:      Head: Normocephalic and atraumatic.      Right Ear: External ear normal.      Left Ear: External ear normal.      Nose: Nose normal.      Mouth/Throat:      Pharynx: No oropharyngeal exudate.   Eyes:      General:         Right eye: No discharge.         Left eye: No discharge.      Conjunctiva/sclera: Conjunctivae normal.      Pupils: Pupils are equal, round, and reactive to light.   Neck:      Vascular: No JVD.   Cardiovascular:      Rate and Rhythm: Normal rate and regular rhythm.      Heart sounds: Normal heart sounds. No murmur heard.  Pulmonary:      Effort: Pulmonary effort is normal. No respiratory distress.      Breath sounds: Normal breath sounds. No wheezing or rales.   Abdominal:      General: Bowel sounds are normal. There is no distension.      Palpations: Abdomen is soft. There is no mass.      Tenderness: There is no abdominal tenderness. There is no guarding or rebound.   Musculoskeletal:      Right shoulder: No swelling, tenderness, bony tenderness or crepitus. Decreased range of motion.      Right elbow: No swelling, deformity or effusion. Decreased range of motion. Tenderness present in lateral epicondyle. No radial head, medial epicondyle or olecranon process tenderness.      Left elbow: Normal.      Cervical back: Normal range of motion and neck supple. No rigidity or tenderness.      Right lower leg: No edema.      Left lower leg: No edema.   Lymphadenopathy:      Cervical: No cervical adenopathy.   Skin:     General: Skin is warm and dry.      Coloration: Skin is not jaundiced or pale.      Findings: No rash.   Neurological:      Mental Status: She is alert and oriented to person, place, and time.         Assessment & Plan:   (I10) Primary hypertension  (primary encounter diagnosis)  Plan: bp controlled  with bp meds. Cpm.     (E78.2) Mixed hyperlipidemia  Plan: check lipid panel; ff low fat low cho ldiet.     (N18.31) Stage 3a chronic kidney disease (HCC)  Plan: had been evalutaed and ff by our nephro.  Continue to avoid nsaids.    (R56.9) Seizure (HCC)  Plan: had been on seizure med managed by her neuro .    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     (N60.91) Atypical ductal hyperplasia of right breast  Plan: pt had been ff by breast surgeon and is current with her mammogram.    (M25.521) Right elbow pain  Plan: Ortho Referral - In Network        Her pain is localized mostly on the right lateral epicondyle area so I told her likely has tennis elbow; will refer to orthol       No orders of the defined types were placed in this encounter.      Meds This Visit:  Requested Prescriptions      No prescriptions requested or ordered in this encounter       Imaging & Referrals:  None          [1]   Allergies  Allergen Reactions    Baby Oil RASH and RESPIRATORY FAILURE    Clemizole ANAPHYLAXIS    [place] RESPIRATORY FAILURE    Orange Juice [Orange Oil] RESPIRATORY FAILURE    Banana NAUSEA AND VOMITING    Penicillins ANAPHYLAXIS

## 2025-03-10 LAB
ALBUMIN SERPL ELPH-MCNC: 3.66 G/DL (ref 3.75–5.21)
ALBUMIN/GLOB SERPL: 1.1 {RATIO} (ref 1–2)
ALPHA1 GLOB SERPL ELPH-MCNC: 0.36 G/DL (ref 0.19–0.46)
ALPHA2 GLOB SERPL ELPH-MCNC: 1.01 G/DL (ref 0.48–1.05)
B-GLOBULIN SERPL ELPH-MCNC: 0.86 G/DL (ref 0.68–1.23)
GAMMA GLOB SERPL ELPH-MCNC: 1.11 G/DL (ref 0.62–1.7)
KAPPA LC FREE SER-MCNC: 2.73 MG/DL (ref 0.33–1.94)
KAPPA LC FREE/LAMBDA FREE SER NEPH: 1.63 {RATIO} (ref 0.26–1.65)
LAMBDA LC FREE SERPL-MCNC: 1.68 MG/DL (ref 0.57–2.63)
PROT SERPL-MCNC: 7 G/DL (ref 5.7–8.2)

## 2025-03-18 ENCOUNTER — TELEPHONE (OUTPATIENT)
Dept: INTERNAL MEDICINE CLINIC | Facility: CLINIC | Age: 60
End: 2025-03-18

## 2025-03-18 DIAGNOSIS — M25.521 RIGHT ELBOW PAIN: Primary | ICD-10-CM

## 2025-03-18 NOTE — TELEPHONE ENCOUNTER
Patient calling about referral # [id number]/orthopedic.     The specialist the patient was referred to, Dr. Bob Yao is no longer with [facility]. Patient is requesting another referral for a different orthopedic surgeon within [facility], or one that takes her insurance.     Please contact the patient via phone. [phone].

## 2025-03-21 ENCOUNTER — TELEPHONE (OUTPATIENT)
Dept: NEPHROLOGY | Facility: CLINIC | Age: 60
End: 2025-03-21

## 2025-03-21 RX ORDER — GABAPENTIN 300 MG/1
300 CAPSULE ORAL 4 TIMES DAILY
Qty: 360 CAPSULE | Refills: 3 | Status: SHIPPED | OUTPATIENT
Start: 2025-03-21

## 2025-03-21 NOTE — TELEPHONE ENCOUNTER
our kidney function overall looks stable.  If doing well just repeat your kidney blood test in 3 months and then see me for follow-up.  Orders are in the computer.  Please call with any questions or concerns.   Written by Ajit West MD on 3/8/2025  2:24 PM CST  Seen by patient Lauren Weston on 3/18/2025  2:25 PM

## 2025-03-21 NOTE — TELEPHONE ENCOUNTER
Refill passed per [facility] protocols.    Requested Prescriptions   Pending Prescriptions Disp Refills    gabapentin 300 MG Oral Cap 360 capsule 3     Sig: Take 1 capsule (300 mg total) by mouth 4 (four) times daily.       Neurology Medications Passed - 3/21/2025  3:39 PM        Passed - In person appointment or virtual visit in the past 6 mos or appointment in next 3 mos        Passed - Medication is active on med list

## 2025-03-28 ENCOUNTER — TELEPHONE (OUTPATIENT)
Facility: CLINIC | Age: 60
End: 2025-03-28

## 2025-03-28 DIAGNOSIS — M25.521 RIGHT ELBOW PAIN: Primary | ICD-10-CM

## 2025-03-28 NOTE — TELEPHONE ENCOUNTER
XR ordered per ortho protocol. Patient was notified via MyChart to let them know that they should arrive 15-20 minutes early, in order for them to complete imaging.

## 2025-03-28 NOTE — TELEPHONE ENCOUNTER
Patient called to schedule for Right ELBOW pain. Please advise if imaging is needed.   Future Appointments   Date Time Provider Department Center   4/17/2025  9:20 AM [id number]   4/23/2025  9:00 AM Belia Cleaning MD [facility] [place]

## 2025-04-01 DIAGNOSIS — G44.329 CHRONIC POST-TRAUMATIC HEADACHE, NOT INTRACTABLE: ICD-10-CM

## 2025-04-01 RX ORDER — BUTALBITAL, ACETAMINOPHEN AND CAFFEINE 50; 325; 40 MG/1; MG/1; MG/1
1 TABLET ORAL 2 TIMES DAILY PRN
Qty: 60 TABLET | Refills: 1 | Status: SHIPPED | OUTPATIENT
Start: 2025-04-01

## 2025-04-01 NOTE — TELEPHONE ENCOUNTER
Medication:  butalbital-acetaminophen-caffeine -40 MG Oral Tab      Date of last refill: 01/27/2025 (#60/1)  [place]     Last office visit: 10/23/2024  Due back to clinic per last office note:  Around 04/23/2025  Date next office visit scheduled:    Future Appointments   Date Time Provider Department Center   4/17/2025  9:00 AM WDR XR [facility] [place]   4/17/2025  9:20 AM Hannah Smith MD [id number]   4/23/2025  9:00 AM Belia Cleaning MD [facility] [place]           Last OV note recommendation:    ASSESSMENT/PLAN:      (G44.329) Chronic post-traumatic headache, not intractable        History of chronic post traumatic headache and now superimposed stress headaches and insomnia     1. Increase dose of Amitriptyline to 100 mg daily (aware of potential interaction with Mirtazipine and tolerating fine)  2. Continue Fioricet prn ( limit 2-3 per week)  3. On Valium chronically for spasms and anxiety- unable to wean off     Advise to see a therapist for stress management         RTC in about 6-9 months     See orders and medications filed with this encounter. The patient indicates understanding of these issues and agrees with the plan.           Belia Cleaning MD  [facility]

## 2025-04-11 RX ORDER — EZETIMIBE 10 MG/1
10 TABLET ORAL DAILY
Qty: 90 TABLET | Refills: 3 | Status: SHIPPED | OUTPATIENT
Start: 2025-04-11

## 2025-04-11 NOTE — TELEPHONE ENCOUNTER
Refill passed per [facility] protocol.  Requested Prescriptions   Pending Prescriptions Disp Refills    EZETIMIBE 10 MG Oral Tab [Pharmacy Med Name: Ezetimibe 10 MG Oral Tablet] 90 tablet 0     Sig: Take 1 tablet by mouth once daily       Cholesterol Medication Protocol Passed - 4/11/2025 11:24 AM        Passed - ALT < 80     Lab Results   Component Value Date    ALT 14 03/06/2025             Passed - ALT resulted within past year        Passed - Lipid panel within past 12 months     Lab Results   Component Value Date    CHOLEST 142 03/06/2025    TRIG 127 03/06/2025    HDL 36 (L) 03/06/2025    LDL 83 03/06/2025    VLDL 20 03/06/2025    NONHDLC 106 03/06/2025             Passed - In person appointment or virtual visit in the past 12 mos or appointment in next 3 mos     Recent Outpatient Visits              1 month ago Primary hypertension    [facility] Krish Little MD    Office Visit    1 month ago Elevated serum immunoglobulin free light chain level    Margareth Kenny [facility] H[facility] [place] Nirmala Murphy MD    Office Visit    4 months ago Atypical ductal hyperplasia of right breast    [facility] ([place], [place]) Liza Marcano APRN    Office Visit    5 months ago Stage 3a chronic kidney disease (HCC)    [facility] Ajit West MD    Office Visit    5 months ago Chronic post-traumatic headache, not intractable    [facility] ([place], [place]) Belia Cleaning MD    Office Visit          Future Appointments         Provider Department Appt Notes    In 6 days WDR XR [address] X-ray [facility]     In 6 days Hannah Smith MD [facility], [address] [place] [address] *XR FIRST;  RIGHT ELBOW    In 1 week Belia Cleaning MD [facility] ([place], [place]) 6 month f/u  Chronic post-traumatic headache, not intractable G44.329                    Passed - Medication is active on med list

## 2025-04-14 DIAGNOSIS — G44.329 CHRONIC POST-TRAUMATIC HEADACHE, NOT INTRACTABLE: ICD-10-CM

## 2025-04-14 NOTE — TELEPHONE ENCOUNTER
Medication: TOPIRAMATE 50 MG Oral Tab      Date of last refill: 01/20/2025 (#30/2)  [place]     Last office visit: 10/23/2024  Due back to clinic per last office note:  Around 04/23/2025  Date next office visit scheduled:    Future Appointments   Date Time Provider Department Center   4/17/2025  9:00 AM WDR XR [facility]1 [facility] XRAY [facility] [place]   4/17/2025  9:20 AM Hannah Smith MD [id number]   4/23/2025  9:00 AM Belia Cleaning MD ENINAPER [facility] [place]           Last OV note recommendation:    ASSESSMENT/PLAN:      (G44.329) Chronic post-traumatic headache, not intractable        History of chronic post traumatic headache and now superimposed stress headaches and insomnia     1. Increase dose of Amitriptyline to 100 mg daily (aware of potential interaction with Mirtazipine and tolerating fine)  2. Continue Fioricet prn ( limit 2-3 per week)  3. On Valium chronically for spasms and anxiety- unable to wean off     Advise to see a therapist for stress management         RTC in about 6-9 months     See orders and medications filed with this encounter. The patient indicates understanding of these issues and agrees with the plan.           Belia Cleaning MD  [facility]

## 2025-04-15 RX ORDER — TOPIRAMATE 50 MG/1
50 TABLET, FILM COATED ORAL EVERY EVENING
Qty: 30 TABLET | Refills: 2 | Status: SHIPPED | OUTPATIENT
Start: 2025-04-15

## 2025-04-17 ENCOUNTER — OFFICE VISIT (OUTPATIENT)
Dept: ORTHOPEDICS CLINIC | Facility: CLINIC | Age: 60
End: 2025-04-17
Payer: MEDICAID

## 2025-04-17 ENCOUNTER — HOSPITAL ENCOUNTER (OUTPATIENT)
Dept: GENERAL RADIOLOGY | Age: 60
Discharge: HOME | End: 2025-04-17
Attending: ORTHOPAEDIC SURGERY
Payer: MEDICAID

## 2025-04-17 VITALS — HEIGHT: 63 IN | BODY MASS INDEX: 39.87 KG/M2 | WEIGHT: 225 LBS

## 2025-04-17 DIAGNOSIS — M77.11 RIGHT TENNIS ELBOW: Primary | ICD-10-CM

## 2025-04-17 DIAGNOSIS — M25.521 RIGHT ELBOW PAIN: ICD-10-CM

## 2025-04-17 PROCEDURE — 73080 X-RAY EXAM OF ELBOW: CPT | Performed by: ORTHOPAEDIC SURGERY

## 2025-04-17 PROCEDURE — 99204 OFFICE O/P NEW MOD 45 MIN: CPT | Performed by: ORTHOPAEDIC SURGERY

## 2025-04-17 NOTE — PROGRESS NOTES
[facility] New Patient Consult      Chief Complaint   Patient presents with    Elbow Pain     RIGHT ELBOW       HPI: The patient is a 59 year old right-hand-dominant-hand dominant female with history of TBI, seizure disorder, obesity who presents for orthopedic consultation with complaints of chronic, persistent right elbow pain.  No acute injury is reported but symptoms have persisted with activities such as gripping and lifting for approximately 4 to 5 months.  Symptoms are reportedly exacerbated by use of the arm for ADLs and kitchen work.  No specific treatment is reported to date.  There is no report of mechanical catching, locking, redness or warmth.  Occasional distal radiation to the hand is reported although she relates chronic numbness due to nerve injury to the index due to trauma.    Past Medical History[1]  Past Surgical History[2]  Current Medications[3]  Allergies[4]  Family History[5]  Social History     Occupational History    Occupation: Unemployed   Tobacco Use    Smoking status: Every Day     Current packs/day: 0.25     Average packs/day: 0.3 packs/day for 21.0 years (5.3 ttl pk-yrs)     Types: Cigarettes     Passive exposure: Current    Smokeless tobacco: Never    Tobacco comments:     smokes only 3-4 sticks /day   Vaping Use    Vaping status: Never Used   Substance and Sexual Activity    Alcohol use: No     Alcohol/week: 0.0 standard drinks of alcohol    Drug use: Yes     Frequency: 7.0 times per week     Types: Cannabis     Comment: daily     Sexual activity: Not on file        ROS:  Complete ROS reviewed by me and non-contributory to the chief complaint except as mentioned above.    Physical Exam:    Ht 5' 3\" (1.6 m)   Wt 225 lb (102.1 kg)   BMI 39.86 kg/m²   Constitutional: Well developed, well nourished 59 year old female  Psychological: NAD, alert and appropriate  Respiratory: Breathing comfortably on room air with RR of 10-14  Cardiac: Palpable distal pulses with  pink warm extremities distally  On examination, right elbow appears benign with no discoloration, deformity or visible swelling. Adequate full active range of motion is noted in flexion extension supination and pronation. There is point tenderness at the lateral epicondyle extending slightly distal. Classic signs of pain are noted on resisted wrist extension and supination. Medial epicondyle is minimally tender.  The distal biceps and triceps insertions are nontender with no evidence of olecranon swelling.  Full strength is noted on resisted manual muscle testing with no instability on varus valgus stress.  Tinel's and median nerve compression test is equivocal at the carpal tunnel.  Neurovascular status reveals subjective asymmetrical sensation to light touch particularly at the index, symmetrical motor strength and perfusion assessment distally.    Imaging: Multiple views of the involved right elbow personally viewed, demonstrating no fracture dislocation or acute bony abnormalities.    XR ELBOW, COMPLETE (MIN 3 VIEWS), RIGHT (CPT=73080)  Result Date: 4/17/2025  CONCLUSION:  Negative exam.   LOCATION:  [facility]    Dictated by (CST): Cathryn Trevino DO on 4/17/2025 at 9:09 AM     Finalized by (CST): Cathryn Trevino DO on 4/17/2025 at 9:09 AM       Assessment/Diagnoses:  Diagnoses and all orders for this visit:    Right tennis elbow  -     Physical Therapy Referral - [facility] Location      Plan:  I reviewed imaging and exam findings with the patient.  History and physical exam are fairly classic for lateral epicondylitis.  We discussed the pathophysiology and mechanism of injury to the extensor origin.  Treatment options were reviewed including activity modification to avoid pronated palm down lifting, use of over-the-counter anti-inflammatories, icing and extensor compartment stretching exercises.  These were reviewed and demonstrated in detail.  Formal physical therapy was also discussed and a prescription was offered.   Finally, the use of a counterforce brace was also reviewed and recommended.  The patient should expect gradual improvement with conservative care with over the next 6 to 8 weeks.  I counseled the patient that it may take much longer for complete resolution, but improvement should be noticeable within the first few months.  Patient education handout was also offered.  All questions were answered the patient verbalized understanding and agreement with this initial conservative treatment plan.  Follow-up if symptoms do not improve.     Hannah Smith MD, [facility]  Orthopaedic Surgery   Sports Medicine/Knee and Shoulder  [facility]  t: [phone]  f: [phone]             This document was partially prepared using Dragon Medical voice recognition software.  Although every attempt is made to correct errors during dictation, discrepancies may still exist.             [1]   Past Medical History:   Back pain    Heart murmur    High blood pressure    Hx of head injury    Hx of migraine headaches    Hyperlipidemia    Seizure disorder (HCC)    Brain spasms    Spasm    Chronic nerve spasms    TBI (traumatic brain injury) (HCC)    Unspecified essential hypertension    Visual impairment    glasses   [2]   Past Surgical History:  Procedure Laterality Date    Cholecystectomy  1999    Colonoscopy N/A 08/17/2017    Procedure: COLONOSCOPY;  Surgeon: Duane Almazan MD;  Location: [facility] ENDOSCOPY    Electrocardiogram, complete  04/16/2012    SCANNED TO MEDIA TAB: 04-    Hysterectomy  2007    partial    Wil biopsy stereo nodule 1 site right (cpt=19081) Right 04/2022    ADH    Wil localization wire 1 site right (cpt=19281) Right 06/2022    Usual ductal hyperplasia    Other Left     rempved mass on hip    Tubal ligation Bilateral 1990   [3]   Current Outpatient Medications   Medication Sig Dispense Refill    TOPIRAMATE 50 MG Oral Tab Take 1 tablet by mouth in the evening 30 tablet 2     ezetimibe 10 MG Oral Tab Take 1 tablet (10 mg total) by mouth daily. 90 tablet 3    butalbital-acetaminophen-caffeine -40 MG Oral Tab Take 1 tablet by mouth 2 (two) times daily as needed. 60 tablet 1    gabapentin 300 MG Oral Cap Take 1 capsule (300 mg total) by mouth 4 (four) times daily. 360 capsule 3    albuterol 108 (90 Base) MCG/ACT Inhalation Aero Soln Inhale 2 puffs into the lungs every 6 (six) hours as needed for Wheezing. 1 each 1    atorvastatin 40 MG Oral Tab Take 1 tablet (40 mg total) by mouth nightly. 90 tablet 3    diazePAM 5 MG Oral Tab Take 1 tablet (5 mg total) by mouth every 12 (twelve) hours as needed for Anxiety (spasm). 60 tablet 2    mirtazapine 15 MG Oral Tab Take 1 tablet (15 mg total) by mouth nightly. 90 tablet 1    lisinopril 10 MG Oral Tab Take 1 tablet (10 mg total) by mouth 2 (two) times daily. 180 tablet 3    amLODIPine 10 MG Oral Tab Take 1 tablet (10 mg total) by mouth nightly. 90 tablet 3    fluticasone propionate 50 MCG/ACT Nasal Suspension 2 sprays by Each Nare route daily. 1 each 2   [4]   Allergies  Allergen Reactions    Baby Oil RASH and RESPIRATORY FAILURE    Clemizole ANAPHYLAXIS    [place]qu[place] RESPIRATORY FAILURE    Orange Juice [Orange Oil] RESPIRATORY FAILURE    Banana NAUSEA AND VOMITING    Penicillins ANAPHYLAXIS   [5]   Family History  Problem Relation Age of Onset    Heart Disorder Father         Bypass x 3, heroin addict, kidney stones    No Known Problems Mother     No Known Problems Sister

## 2025-04-23 ENCOUNTER — TELEPHONE (OUTPATIENT)
Dept: PHYSICAL THERAPY | Age: 60
End: 2025-04-23

## 2025-04-23 ENCOUNTER — OFFICE VISIT (OUTPATIENT)
Dept: NEUROLOGY | Facility: CLINIC | Age: 60
End: 2025-04-23
Payer: MEDICAID

## 2025-04-23 VITALS
HEART RATE: 91 BPM | RESPIRATION RATE: 16 BRPM | BODY MASS INDEX: 38.41 KG/M2 | OXYGEN SATURATION: 99 % | SYSTOLIC BLOOD PRESSURE: 120 MMHG | HEIGHT: 64 IN | WEIGHT: 225 LBS | DIASTOLIC BLOOD PRESSURE: 60 MMHG

## 2025-04-23 DIAGNOSIS — M62.838 MUSCLE SPASM: ICD-10-CM

## 2025-04-23 DIAGNOSIS — G44.329 CHRONIC POST-TRAUMATIC HEADACHE, NOT INTRACTABLE: Primary | ICD-10-CM

## 2025-04-23 DIAGNOSIS — F41.9 ANXIETY: ICD-10-CM

## 2025-04-23 PROCEDURE — 99213 OFFICE O/P EST LOW 20 MIN: CPT | Performed by: OTHER

## 2025-04-23 NOTE — PATIENT INSTRUCTIONS
Refill policies:    Allow 2-3 business days for refills; controlled substances may take longer.  Contact your pharmacy at least 5 days prior to running out of medication and have them send an electronic request or submit request through the “request refill” option in your [employer] account.  Refills are not addressed on weekends; covering physicians do not authorize routine medications on weekends.  No narcotics or controlled substances are refilled after noon on Fridays or by on call physicians.  By law, narcotics must be electronically prescribed.  A 30 day supply with no refills is the maximum allowed.  If your prescription is due for a refill, you may be due for a follow up appointment.  To best provide you care, patients receiving routine medications need to be seen at least once a year.  Patients receiving narcotic/controlled substance medications need to be seen at least once every 3 months.  In the event that your preferred pharmacy does not have the requested medication in stock (e.g. Backordered), it is your responsibility to find another pharmacy that has the requested medication available.  We will gladly send a new prescription to that pharmacy at your request.    Scheduling Tests:    If your physician has ordered radiology tests such as MRI or CT scans, please contact Central Scheduling at [phone] right away to schedule the test.  Once scheduled, the [facility] Centralized Referral Team will work with your insurance carrier to obtain pre-certification or prior authorization.  Depending on your insurance carrier, approval may take 3-10 days.  It is highly recommended patients assure they have received an authorization before having a test performed.  If test is done without insurance authorization, patient may be responsible for the entire amount billed.      Precertification and Prior Authorizations:  If your physician has recommended that you have a procedure or additional testing performed the [facility]  Centralized Referral Team will contact your insurance carrier to obtain pre-certification or prior authorization.    You are strongly encouraged to contact your insurance carrier to verify that your procedure/test has been approved and is a COVERED benefit.  Although the [facility] Centralized Referral Team does its due diligence, the insurance carrier gives the disclaimer that \"Although the procedure is authorized, this does not guarantee payment.\"    Ultimately the patient is responsible for payment.   Thank you for your understanding in this matter.  Paperwork Completion:  If you require FMLA or disability paperwork for your recovery, please make sure to either drop it off or have it faxed to our office at [phone]. Be sure the form has your name and date of birth on it.  The form will be faxed to our Forms Department and they will complete it for you.  There is a 25$ fee for all forms that need to be filled out.  Please be aware there is a 10-14 day turnaround time.  You will need to sign a release of information (RORY) form if your paperwork does not come with one.  You may call the Forms Department with any questions at [phone].  Their fax number is [phone].

## 2025-04-23 NOTE — PROGRESS NOTES
Patient is here following up on headaches   Patient stated she has a knot on the right eye that she is still worried about she stated its thick it doesn't move or hurt but it bothers her

## 2025-04-24 RX ORDER — DIAZEPAM 5 MG/1
5 TABLET ORAL EVERY 12 HOURS PRN
Qty: 60 TABLET | Refills: 2 | Status: SHIPPED | OUTPATIENT
Start: 2025-04-24
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
## 2025-04-24 NOTE — TELEPHONE ENCOUNTER
Medication: diazePAM 5 MG Oral Tab      Date of last refill: 01/27/2025 (#60/2)  [place]     Last office visit: 04/23/2025  Due back to clinic per last office note:  Around 04/23/2026  Date next office visit scheduled:    No future appointments.        Last OV note recommendation:    ASSESSMENT/PLAN:      (G44.329) Chronic post-traumatic headache, not intractable        History of chronic post traumatic headache and now superimposed stress headaches   Stable overall.  Continue Topamax 50 mg daily  Continue Fioricet prn ( limit 2-3 per week)  On Valium chronically for spasms and anxiety- unable to wean off     Advise to see Ophthalmologist for right eyelid lesion      RTC in about 12 months     See orders and medications filed with this encounter. The patient indicates understanding of these issues and agrees with the plan.           Belia Cleaning MD  [facility]

## 2025-05-05 RX ORDER — AMLODIPINE BESYLATE 10 MG/1
10 TABLET ORAL NIGHTLY
Qty: 90 TABLET | Refills: 3 | Status: SHIPPED | OUTPATIENT
Start: 2025-05-05

## 2025-05-26 DIAGNOSIS — G44.329 CHRONIC POST-TRAUMATIC HEADACHE, NOT INTRACTABLE: ICD-10-CM

## 2025-05-26 RX ORDER — MIRTAZAPINE 15 MG/1
15 TABLET, FILM COATED ORAL NIGHTLY
Qty: 90 TABLET | Refills: 1 | OUTPATIENT
Start: 2025-05-26

## 2025-05-26 NOTE — TELEPHONE ENCOUNTER
Please review.  Protocol failed/has no protocol.    Last Office Visit: 03/06/2025  Please advise if refill is appropriate.  Requested Prescriptions     Pending Prescriptions Disp Refills    MIRTAZAPINE 15 MG Oral Tab [Pharmacy Med Name: Mirtazapine 15 MG Oral Tablet] 90 tablet 0     Sig: Take 1 tablet by mouth nightly

## 2025-05-27 NOTE — TELEPHONE ENCOUNTER
Requested Prescriptions     Pending Prescriptions Disp Refills    butalbital-acetaminophen-caffeine -40 MG Oral Tab 60 tablet 1     Sig: Take 1 tablet by mouth 2 (two) times daily as needed.        Last OV: 4/23/2025  Next OV:     [place]/;  Butalbital-APAP-Caffeine     Dispensed Written Strength Quantity Refills Days Supply Provider Pharmacy   BUTAL/ACETAMN/CF -40 TAB 05/01/2025 04/01/2025  60 each  30 Belia Cleaning MD [facility] ...   BUTAL/ACETAMN/CF -40 TAB 04/03/2025 04/01/2025  60 each  30 Belia Cleaning MD [facility] ...   BUTAL/ACETAMN/CF -40 TAB 03/05/2025 01/27/2025  60 each  30 Belia Cleaning MD [facility] ...   BUTAL/ACETAMN/CF -40 TAB 02/03/2025 01/27/2025  60 each  30 Belia Cleaning MD [facility]       Last office visit plan;   ASSESSMENT/PLAN:      (G44.329) Chronic post-traumatic headache, not intractable        History of chronic post traumatic headache and now superimposed stress headaches   Stable overall.  Continue Topamax 50 mg daily  Continue Fioricet prn ( limit 2-3 per week)  On Valium chronically for spasms and anxiety- unable to wean off     Advise to see Ophthalmologist for right eyelid lesion      RTC in about 12 months     See orders and medications filed with this encounter. The patient indicates understanding of these issues and agrees with the plan.           Belia Cleaning MD  [facility]     Meds This Visit:  Requested Prescriptions        No prescriptions requested or ordered in this encounter         Imaging & Referrals:  None      ID#[id number]        Other Notes  All notes  Progress Notes from Pauline Casas MA  Instructions      Return in about 1 year (around 4/23/2026).

## 2025-05-28 RX ORDER — MIRTAZAPINE 15 MG/1
15 TABLET, FILM COATED ORAL NIGHTLY
Qty: 39 TABLET | Refills: 0 | Status: SHIPPED | OUTPATIENT
Start: 2025-05-28

## 2025-05-28 RX ORDER — LISINOPRIL 10 MG/1
10 TABLET ORAL 2 TIMES DAILY
Qty: 180 TABLET | Refills: 3 | Status: SHIPPED | OUTPATIENT
Start: 2025-05-28

## 2025-05-28 RX ORDER — BUTALBITAL, ACETAMINOPHEN AND CAFFEINE 50; 325; 40 MG/1; MG/1; MG/1
1 TABLET ORAL 2 TIMES DAILY PRN
Qty: 60 TABLET | Refills: 1 | Status: SHIPPED | OUTPATIENT
Start: 2025-05-28

## 2025-05-28 NOTE — TELEPHONE ENCOUNTER
Patient is calling for status of her medication refill request. Per the patient she is out of medication. Patient can be reached at [phone]. Patient, is requesting a call when the script gets sent to the pharmacy.

## 2025-05-28 NOTE — TELEPHONE ENCOUNTER
Patient calling to follow up on medication, stated is completely out of medication, asking to be sent.

## 2025-05-28 NOTE — TELEPHONE ENCOUNTER
Refill Per Protocol     Requested Prescriptions   Pending Prescriptions Disp Refills    lisinopril 10 MG Oral Tab 180 tablet 3     Sig: Take 1 tablet (10 mg total) by mouth 2 (two) times daily.       Hypertension Medications Protocol Passed - 5/28/2025 11:47 AM        Passed - CMP or BMP in past 12 months        Passed - Last BP reading less than 140/90     BP Readings from Last 1 Encounters:   04/23/25 120/60               Passed - In person appointment or virtual visit in the past 12 mos or appointment in next 3 mos     Recent Outpatient Visits              1 month ago [facility], [facility] int[facility]    Office Visit    1 month ago Right tennis elbow    [facility], [address] [place] [address], [place] Hannah Smith MD    Office Visit    2 months ago Primary hypertension    [facility], Krish Andersen MD    Office Visit    3 months ago Elevated serum immunoglobulin free light chain level    [facility] Count includes the [facility]ology Oncology Nirmala Peterson MD    Office Visit    5 months ago Atypical ductal hyperplasia of right breast    [facility], Liza Amor APRN    Office Visit          Future Appointments         Provider Department Appt Notes    In 2 weeks Lou Wyman, PT [facility] Physical Therapy [facility] MEDICAID/[facility] FAMILY HEALTH PLAN  *pt declined [facility]    In 3 weeks Lou Wyman, PT [facility] Physical Therapy     In 3 weeks Lou Wyman, PT [facility] Physical Therapy     In 4 weeks Felecia Hargrove, PT [facility] Physical Therapy     In 1 month Lou Wyman, PT [facility] Physical Therapy     In 1 month Lou Wyman, PT [facility] Physical Therapy                     Passed - EGFRCR or GFRAA > 50     GFR  Evaluation  EGFRCR: 57 , resulted on 3/6/2025          Passed - Medication is active on med list

## 2025-05-28 NOTE — TELEPHONE ENCOUNTER
Please review.  Protocol failed/has no protocol.      Marked High Priority. Patient states out of medication.     Last Office Visit: 03/06/2025  Please advise if refill is appropriate.  Requested Prescriptions             Pending Prescriptions Disp Refills    MIRTAZAPINE 15 MG Oral Tab [Pharmacy Med Name: Mirtazapine 15 MG Oral Tablet] 90 tablet 0       Sig: Take 1 tablet by mouth nightly

## 2025-06-01 RX ORDER — ALBUTEROL SULFATE 90 UG/1
2 INHALANT RESPIRATORY (INHALATION) EVERY 6 HOURS PRN
Qty: 9 G | Refills: 0 | Status: SHIPPED | OUTPATIENT
Start: 2025-06-01

## 2025-06-16 ENCOUNTER — OFFICE VISIT (OUTPATIENT)
Dept: PHYSICAL THERAPY | Facility: HOSPITAL | Age: 60
End: 2025-06-16
Attending: ORTHOPAEDIC SURGERY
Payer: MEDICAID

## 2025-06-16 DIAGNOSIS — M77.11 RIGHT TENNIS ELBOW: Primary | ICD-10-CM

## 2025-06-16 PROCEDURE — 97162 PT EVAL MOD COMPLEX 30 MIN: CPT | Performed by: PHYSICAL THERAPIST

## 2025-06-16 PROCEDURE — 97110 THERAPEUTIC EXERCISES: CPT | Performed by: PHYSICAL THERAPIST

## 2025-06-17 NOTE — PROGRESS NOTES
UPPER EXTREMITY EVALUATION:     Diagnosis:   M77.11 (ICD-10-CM) - 726.32 (ICD-9-CM) - Right tennis elbow Patient:  Lauren Weston (59 year old, female)        Referring Provider: Hannah Smith  Today's Date   6/17/2025    Precautions:  Other (use comment) (TBI, seizures)   Date of Evaluation: 06/16/25  Next MD visit: No data recorded  Date of Surgery: No data recorded     PATIENT SUMMARY     Summary of chief complaints: right elbow, forearm pain  History of current condition: 6 months right arm pain;  woke up with a small bubble along right elbow, does not recall any trauma.  Swelling went down after 2 weeks.  Now has pain in right elbow, forearm, and into hand, arms, occ pain in shoulder .  Has difficulty with lifting, carrying items in her right hand, arm. \"Hurts all the time. Doctor told me not to do anything with my right hand. Can't use right hand and causes more pain . Hard not to use it \".   Right hand dominant       No medications .   Takes medications for blackouts, seizure.  TBI in past, does not drive.      Home and self care activities are limited due to hand and arm pain .   Got a tennis elbow splint and it made it worse.   Pain level: current 10 /10, at best 10 /10, at worst 10 /10  Description of symptoms: occ sharp, shooting pain in right forearm .  Difficulty with lifting, carrying items in right forearm.   Occupation:     Leisure activities/Hobbies:     Prior level of function:    Current limitations: sig. limited with home, self care activities, ingris those such as bathing, cooking, carrying, lifting  Pt goals: to get better  Hand Dominance: right   Past medical history was reviewed with Lauren.  Significant findings include:    Imaging/Tests:     Lauren  has a past medical history of Back pain, Heart murmur, High blood pressure, head injury (2007), migraine headaches, Hyperlipidemia, Seizure disorder ([facility]), Spasm, TBI (traumatic brain injury) ([facility]), Unspecified essential hypertension, and Visual  impairment.  She  has a past surgical history that includes tubal ligation (Bilateral, 1990); electrocardiogram, complete (04/16/2012); cholecystectomy (1999); colonoscopy (N/A, 08/17/2017); yulissa biopsy stereo nodule 1 site right (cpt=19081) (Right, 04/2022); hysterectomy (2007); other (Left); and yulissa localization wire 1 site right (cpt=19281) (Right, 06/2022).    ASSESSMENT  Lauren presents to physical therapy evaluation with primary c/o right elbow, forearm pain. The results of the objective tests and measures show right forearm pain reproducable with tTP along lateral epicondyle, pain with acitve reistance and weakness in right UE , consistent with lateral epicondylitis. Functional deficits include but are not limited to sig. limited with home, self care activities, ingris those such as bathing, cooking, carrying, lifting. Signs and symptoms are consistent with diagnosis of M77.11 (ICD-10-CM) - 726.32 (ICD-9-CM) - Right tennis elbow. Pt and PT discussed evaluation findings, pathology, POC and HEP.  Pt voiced understanding and performs HEP correctly without reported pain. Skilled Physical Therapy is medically necessary to address the above impairments and reach functional goals.    OBJECTIVE:      Musculoskeletal  Observation/Posture: forward head posture; increased thoracic kyphosis; shoulder shrug  Palpation: sig TTP along righ UT forearm and along lateral epicondyle region of right UE.  pain with all acitve movement.   Accessory motion:           ROM and Strength (* denotes performed with pain)  Elbow   ROM MMT (-/5)    R L R L     Flex (C6)     2 (pain) 4     Ext (C7)     2 4     Pronation     2 (pain) 4     Supination     2 4   ,   Wrist   ROM MMT (-/5)    R L R L     Flex (C7)     2 4     Ext (C6)       4     Ulnar Dev     2 4     Radial Dev     2 4     Thumb Ext (C8)     2 4     Digit Flex (C8) n/a         Interossei (T1) n/a          (lbs) n/a 1.8 12.8     Pinch (lbs) n/a                    Neurological:  Sensation: intact      Today's Treatment and Response:   Pt education was provided on exam findings, treatment diagnosis, treatment plan, expectations, and prognosis.    Today's Treatment       6/16/2025   UE Treatment   Additional Treatment KT and tubigrip to right forearm    Total Time Of Timed Procedures 0   Total Time Of Service-Based Procedures 0   Total Treatment Time 0   HEP Exercises  - Wrist Flexion Extension AROM with Fingers Curled and Palm Down  - 1 x daily - 7 x weekly - 2 sets - 10 reps  - Forearm Pronation and Supination With Arm Supported  - 1 x daily - 7 x weekly - 2 sets - 10 reps  - Supported Elbow Flexion Extension PROM  - 1 x daily - 7 x weekly - 2 sets - 10 reps        Patient was instructed in and issued a HEP for: Exercises  - Wrist Flexion Extension AROM with Fingers Curled and Palm Down  - 1 x daily - 7 x weekly - 2 sets - 10 reps  - Forearm Pronation and Supination With Arm Supported  - 1 x daily - 7 x weekly - 2 sets - 10 reps  - Supported Elbow Flexion Extension PROM  - 1 x daily - 7 x weekly - 2 sets - 10 reps    Charges:  PT EVAL: Moderate Complexity, 1 TE 1IE  In agreement with evaluation findings and clinical rationale, this evaluation involved MODERATE COMPLEXITY decision making due to 1-2 personal factors/comorbidities, 3 or more body structures involved/activity limitations, and evolving symptoms as documented in the evaluation.                                                          PLAN OF CARE:      Goals: (to be met in 10 visits)    Not Met Progress Toward Partially Met Met   Pt will improve right   strength to >15 lbs to be able to open a jar without difficulty. [] [] [] []   Pt will demonstrate improved elbow extensor mm tension to WNL to be able to carry objects at home and  with <2/10 elbow pain. [] [] [] []   Pt will have WNL wrist extensor mm flexibility to be able to complete home, self care activities . [] [] [] []   Pt will be  independent and compliant with comprehensive HEP to maintain progress achieved in PT. [] [] [] []         Frequency / Duration: Patient will be seen 2x/week or a total of 10  visits over a 90 day period. Treatment will include: Manual Therapy; Neuromuscular Re-education; Self-Care Home Management; Therapeutic Activities; Therapeutic Exercise; Home Exercise Program instruction; Patient/Family Education    Education or treatment limitation: Cognition (history of TBI)   Rehab Potential: good     QuickDASH Outcome Score  Score: (Patient-Rptd) 86.36 % (6/16/2025  1:45 PM)      Patient/Family/Caregiver was advised of these findings, precautions, and treatment options and has agreed to actively participate in planning and for this course of care.    Thank you for your referral. Please co-sign or sign and return this letter via fax as soon as possible to [phone]. If you have any questions, please contact me at Dept: [phone]    Sincerely,  Electronically signed by therapist: Lou Wyman, PT  Physician's certification required: Yes  I certify the need for these services furnished under this plan of treatment and while under my care.    X___________________________________________________ Date____________________    Certification From: 6/17/2025  To: 9/15/2025

## 2025-06-20 ENCOUNTER — TELEPHONE (OUTPATIENT)
Dept: PHYSICAL THERAPY | Facility: HOSPITAL | Age: 60
End: 2025-06-20

## 2025-06-20 ENCOUNTER — APPOINTMENT (OUTPATIENT)
Dept: PHYSICAL THERAPY | Facility: HOSPITAL | Age: 60
End: 2025-06-20
Attending: ORTHOPAEDIC SURGERY
Payer: MEDICAID

## 2025-06-23 ENCOUNTER — OFFICE VISIT (OUTPATIENT)
Dept: PHYSICAL THERAPY | Facility: HOSPITAL | Age: 60
End: 2025-06-23
Attending: ORTHOPAEDIC SURGERY
Payer: MEDICAID

## 2025-06-23 PROCEDURE — 97140 MANUAL THERAPY 1/> REGIONS: CPT | Performed by: PHYSICAL THERAPIST

## 2025-06-23 PROCEDURE — 97110 THERAPEUTIC EXERCISES: CPT | Performed by: PHYSICAL THERAPIST

## 2025-06-23 NOTE — PROGRESS NOTES
Patient: Lauren Weston (59 year old, female) Referring Provider:  Insurance:   Diagnosis: M77.11 (ICD-10-CM) - 726.32 (ICD-9-CM) - Right tennis elbow Hannah Smith  BLUE CROSS MEDICAID   Date of Surgery: No data recorded Next MD visit:  N/A   Precautions:  -- (ADHESIVE ALLERGY) No data recorded Referral Information:    Date of Evaluation: Req: 8, Auth: 8, Exp: 8/15/2025    06/16/25 POC Auth Visits:          Today's Date   6/23/2025    Subjective  Pain in right wirst, forearm.  Unable to wear KT due to adhesive allergy       Pain: 8/10     Objective  seen for first treatment .  Palpation with tenderness along right forearm/lateral epicondyle            Assessment  Good tolerance to treatment .  Sore when completing weighted exerices.  Tubigrip with good relief .    Goals (to be met in 10 visits)      Not Met Progress Toward Partially Met Met   Pt will improve right   strength to >15 lbs to be able to open a jar without difficulty. [] [] [] []   Pt will demonstrate improved elbow extensor mm tension to WNL to be able to carry objects at home and  with <2/10 elbow pain. [] [] [] []   Pt will have WNL wrist extensor mm flexibility to be able to complete home, self care activities . [] [] [] []   Pt will be independent and compliant with comprehensive HEP to maintain progress achieved in PT. [] [] [] []             Plan  continue with current POC.    Treatment Last 4 Visits  Treatment Day: 2       6/16/2025 6/23/2025   UE Treatment   Therapeutic Exercise  1# forearm :  -Pronation  -Supination   -elbow flexion/extension x20   -Wrist flexion   -Wrist extension   -Frowns and smiles with theratube   -twist theratub e   Manual Therapy  Forearm STM to extensor tendons/lateral epicondylitis s  Forearm stretch    Modalities  Cp x8'   Additional Treatment KT and tubigrip to right forearm     Therapeutic Exercise Minutes  30   Manual Therapy Minutes  10   Hot/Cold Pack Minutes  8   Total Time Of Timed Procedures 0 40   Total  Time Of Service-Based Procedures 0 8   Total Treatment Time 0 48   HEP Exercises  - Wrist Flexion Extension AROM with Fingers Curled and Palm Down  - 1 x daily - 7 x weekly - 2 sets - 10 reps  - Forearm Pronation and Supination With Arm Supported  - 1 x daily - 7 x weekly - 2 sets - 10 reps  - Supported Elbow Flexion Extension PROM  - 1 x daily - 7 x weekly - 2 sets - 10 reps         HEP  Exercises  - Wrist Flexion Extension AROM with Fingers Curled and Palm Down  - 1 x daily - 7 x weekly - 2 sets - 10 reps  - Forearm Pronation and Supination With Arm Supported  - 1 x daily - 7 x weekly - 2 sets - 10 reps  - Supported Elbow Flexion Extension PROM  - 1 x daily - 7 x weekly - 2 sets - 10 reps    Charges  1 MT 2 TE

## 2025-06-25 ENCOUNTER — OFFICE VISIT (OUTPATIENT)
Dept: PHYSICAL THERAPY | Facility: HOSPITAL | Age: 60
End: 2025-06-25
Attending: ORTHOPAEDIC SURGERY
Payer: MEDICAID

## 2025-06-25 PROCEDURE — 97110 THERAPEUTIC EXERCISES: CPT

## 2025-06-25 PROCEDURE — 97140 MANUAL THERAPY 1/> REGIONS: CPT

## 2025-06-25 NOTE — PROGRESS NOTES
Patient: Lauren Weston (59 year old, female) Referring Provider:  Insurance:   Diagnosis: M77.11 (ICD-10-CM) - 726.32 (ICD-9-CM) - Right tennis elbow Kellen Choi BLUE CROSS MEDICAID   Date of Surgery: No data recorded Next MD visit:  N/A   Precautions:  -- (ADHESIVE ALLERGY) No data recorded Referral Information:    Date of Evaluation: Req: 8, Auth: 8, Exp: 8/15/2025    06/16/25 POC Auth Visits:          Today's Date   6/25/2025    Subjective  Pt. states her elbow pain is 9/10 and is very hesitant to perform any movement with it this date.       Pain: 9/10     Objective  Refer to flow sheet. Pt. very hesitant with all R UE movements, stating it is too painful to do any movement. Pt. performed all exercises very slowly.          Assessment  poor tolerance to all exercises this date. Did respond well to MT and icing for improving of R elbow/wrist and pain management.    Goals (to be met in 10 visits)        Not Met Progress Toward Partially Met Met   Pt will improve right   strength to >15 lbs to be able to open a jar without difficulty. [] [] [] []   Pt will demonstrate improved elbow extensor mm tension to WNL to be able to carry objects at home and  with <2/10 elbow pain. [] [] [] []   Pt will have WNL wrist extensor mm flexibility to be able to complete home, self care activities . [] [] [] []   Pt will be independent and compliant with comprehensive HEP to maintain progress achieved in PT. [] [] [] []        Plan  continue to progress as tolerated.    Treatment Last 4 Visits  Treatment Day: 3       6/16/2025 6/23/2025 6/25/2025   UE Treatment   Therapeutic Exercise  1# forearm :  -Pronation  -Supination   -elbow flexion/extension x20   -Wrist flexion   -Wrist extension   -Frowns and smiles with theratube   -twist theratub e Scifit arms only 2'fwd2'b  Roll ball on table with SB x 10  1# forearm :  -Pronation x 5  -Supination x5  -elbow flexion/extension x20  -Wrist flexion x10  -Wrist extension x10  -Frowns  and smiles with theratube yellow x 10  -twist theratube yellow x 10       Manual Therapy  Forearm STM to extensor tendons/lateral epicondylitis s  Forearm stretch  Forearm STM to extensor tendons/lateral epicondylitis   Passive stretching R elbow/wrist by PT   Modalities  Cp x8' Ice x 5   Additional Treatment KT and tubigrip to right forearm      Therapeutic Exercise Minutes  30 30   Manual Therapy Minutes  10 15   Hot/Cold Pack Minutes  8 5   Total Time Of Timed Procedures 0 40 45   Total Time Of Service-Based Procedures 0 8 5   Total Treatment Time 0 48 50   HEP Exercises  - Wrist Flexion Extension AROM with Fingers Curled and Palm Down  - 1 x daily - 7 x weekly - 2 sets - 10 reps  - Forearm Pronation and Supination With Arm Supported  - 1 x daily - 7 x weekly - 2 sets - 10 reps  - Supported Elbow Flexion Extension PROM  - 1 x daily - 7 x weekly - 2 sets - 10 reps    Exercises  - Wrist Flexion Extension AROM with Fingers Curled and Palm Down  - 1 x daily - 7 x weekly - 2 sets - 10 reps  - Forearm Pronation and Supination With Arm Supported  - 1 x daily - 7 x weekly - 2 sets - 10 reps  - Supported Elbow Flexion Extension PROM  - 1 x daily - 7 x weekly - 2 sets - 10 reps            HEP    Exercises  - Wrist Flexion Extension AROM with Fingers Curled and Palm Down  - 1 x daily - 7 x weekly - 2 sets - 10 reps  - Forearm Pronation and Supination With Arm Supported  - 1 x daily - 7 x weekly - 2 sets - 10 reps  - Supported Elbow Flexion Extension PROM  - 1 x daily - 7 x weekly - 2 sets - 10 reps        Charges  TE 2 MT

## 2025-06-27 ENCOUNTER — TELEPHONE (OUTPATIENT)
Dept: NEUROLOGY | Facility: CLINIC | Age: 60
End: 2025-06-27

## 2025-06-27 NOTE — TELEPHONE ENCOUNTER
Patient is having concerns about head pain and worried and she stated she can't sleep and would like to give the nurse some synonyms she is having.

## 2025-06-27 NOTE — TELEPHONE ENCOUNTER
Patient reports excruciating head pains that are waking her from sleep for the last month. States they are not migraines nor her normal headaches. This morning woke up screaming in pain. Patient also reports blurry vision, disorientation and keeps saying she feels weird. Speech is low and labored at times. Advised patient she needs urgent evaluation. She will ask friend to take her to ER otherwise advised patient she needs to call 911.

## 2025-06-27 NOTE — TELEPHONE ENCOUNTER
Please review.  Protocol failed / Has no protocol.     Requested Prescriptions   Pending Prescriptions Disp Refills    mirtazapine 15 MG Oral Tab 90 tablet 0     Sig: Take 1 tablet (15 mg total) by mouth nightly.       There is no refill protocol information for this order

## 2025-06-28 ENCOUNTER — APPOINTMENT (OUTPATIENT)
Dept: CT IMAGING | Facility: HOSPITAL | Age: 60
End: 2025-06-28
Attending: EMERGENCY MEDICINE
Payer: MEDICAID

## 2025-06-28 ENCOUNTER — HOSPITAL ENCOUNTER (EMERGENCY)
Facility: HOSPITAL | Age: 60
Discharge: HOME | End: 2025-06-28
Attending: EMERGENCY MEDICINE
Payer: MEDICAID

## 2025-06-28 VITALS
WEIGHT: 200 LBS | DIASTOLIC BLOOD PRESSURE: 72 MMHG | HEART RATE: 65 BPM | RESPIRATION RATE: 22 BRPM | TEMPERATURE: 98 F | HEIGHT: 64 IN | BODY MASS INDEX: 34.15 KG/M2 | SYSTOLIC BLOOD PRESSURE: 102 MMHG | OXYGEN SATURATION: 99 %

## 2025-06-28 DIAGNOSIS — R51.9 NONINTRACTABLE EPISODIC HEADACHE, UNSPECIFIED HEADACHE TYPE: Primary | ICD-10-CM

## 2025-06-28 LAB
ALBUMIN SERPL-MCNC: 4.5 G/DL (ref 3.2–4.8)
ALBUMIN/GLOB SERPL: 1.5 {RATIO} (ref 1–2)
ALP LIVER SERPL-CCNC: 120 U/L (ref 46–118)
ALT SERPL-CCNC: 15 U/L (ref 10–49)
ANION GAP SERPL CALC-SCNC: 10 MMOL/L (ref 0–18)
APAP SERPL-MCNC: <2 UG/ML (ref 10–20)
AST SERPL-CCNC: 21 U/L (ref ?–34)
BASE EXCESS BLDV CALC-SCNC: -4.8 MMOL/L
BASOPHILS # BLD AUTO: 0.06 X10(3) UL (ref 0–0.2)
BASOPHILS NFR BLD AUTO: 0.7 %
BILIRUB SERPL-MCNC: 0.2 MG/DL (ref 0.3–1.2)
BUN BLD-MCNC: 11 MG/DL (ref 9–23)
CALCIUM BLD-MCNC: 9.3 MG/DL (ref 8.7–10.6)
CHLORIDE SERPL-SCNC: 115 MMOL/L (ref 98–112)
CO2 SERPL-SCNC: 16 MMOL/L (ref 21–32)
CREAT BLD-MCNC: 1.1 MG/DL (ref 0.55–1.02)
EGFRCR SERPLBLD CKD-EPI 2021: 59 ML/MIN/1.73M2 (ref 60–?)
EOSINOPHIL # BLD AUTO: 0.15 X10(3) UL (ref 0–0.7)
EOSINOPHIL NFR BLD AUTO: 1.7 %
ERYTHROCYTE [DISTWIDTH] IN BLOOD BY AUTOMATED COUNT: 13.7 %
GLOBULIN PLAS-MCNC: 3.1 G/DL (ref 2–3.5)
GLUCOSE BLD-MCNC: 100 MG/DL (ref 70–99)
HCO3 BLDV-SCNC: 20.3 MEQ/L (ref 22–26)
HCT VFR BLD AUTO: 43 % (ref 35–48)
HGB BLD-MCNC: 14 G/DL (ref 12–16)
IMM GRANULOCYTES # BLD AUTO: 0.02 X10(3) UL (ref 0–1)
IMM GRANULOCYTES NFR BLD: 0.2 %
LACTATE SERPL-SCNC: 1 MMOL/L (ref 0.5–2)
LYMPHOCYTES # BLD AUTO: 4.16 X10(3) UL (ref 1–4)
LYMPHOCYTES NFR BLD AUTO: 46.4 %
MCH RBC QN AUTO: 28.6 PG (ref 26–34)
MCHC RBC AUTO-ENTMCNC: 32.6 G/DL (ref 31–37)
MCV RBC AUTO: 87.9 FL (ref 80–100)
MONOCYTES # BLD AUTO: 0.43 X10(3) UL (ref 0.1–1)
MONOCYTES NFR BLD AUTO: 4.8 %
NEUTROPHILS # BLD AUTO: 4.14 X10 (3) UL (ref 1.5–7.7)
NEUTROPHILS # BLD AUTO: 4.14 X10(3) UL (ref 1.5–7.7)
NEUTROPHILS NFR BLD AUTO: 46.2 %
OSMOLALITY SERPL CALC.SUM OF ELEC: 291 MOSM/KG (ref 275–295)
OXYHGB MFR BLDV: 65.5 % (ref 72–78)
PCO2 BLDV: 41 MM HG (ref 38–50)
PH BLDV: 7.32 [PH] (ref 7.33–7.43)
PLATELET # BLD AUTO: 265 10(3)UL (ref 150–450)
PO2 BLDV: 38 MM HG (ref 30–50)
POTASSIUM SERPL-SCNC: 5 MMOL/L (ref 3.5–5.1)
PROT SERPL-MCNC: 7.6 G/DL (ref 5.7–8.2)
RBC # BLD AUTO: 4.89 X10(6)UL (ref 3.8–5.3)
SALICYLATES SERPL-MCNC: <3 MG/DL (ref 3–20)
SODIUM SERPL-SCNC: 141 MMOL/L (ref 136–145)
TROPONIN I SERPL HS-MCNC: <3 NG/L (ref ?–34)
WBC # BLD AUTO: 9 X10(3) UL (ref 4–11)

## 2025-06-28 PROCEDURE — 96376 TX/PRO/DX INJ SAME DRUG ADON: CPT

## 2025-06-28 PROCEDURE — 85025 COMPLETE CBC W/AUTO DIFF WBC: CPT | Performed by: EMERGENCY MEDICINE

## 2025-06-28 PROCEDURE — 80143 DRUG ASSAY ACETAMINOPHEN: CPT | Performed by: EMERGENCY MEDICINE

## 2025-06-28 PROCEDURE — 70498 CT ANGIOGRAPHY NECK: CPT | Performed by: EMERGENCY MEDICINE

## 2025-06-28 PROCEDURE — 93005 ELECTROCARDIOGRAM TRACING: CPT

## 2025-06-28 PROCEDURE — 80179 DRUG ASSAY SALICYLATE: CPT | Performed by: EMERGENCY MEDICINE

## 2025-06-28 PROCEDURE — 70496 CT ANGIOGRAPHY HEAD: CPT | Performed by: EMERGENCY MEDICINE

## 2025-06-28 PROCEDURE — 83605 ASSAY OF LACTIC ACID: CPT | Performed by: EMERGENCY MEDICINE

## 2025-06-28 PROCEDURE — 82803 BLOOD GASES ANY COMBINATION: CPT | Performed by: EMERGENCY MEDICINE

## 2025-06-28 PROCEDURE — 84484 ASSAY OF TROPONIN QUANT: CPT | Performed by: EMERGENCY MEDICINE

## 2025-06-28 PROCEDURE — 99285 EMERGENCY DEPT VISIT HI MDM: CPT

## 2025-06-28 PROCEDURE — 96361 HYDRATE IV INFUSION ADD-ON: CPT

## 2025-06-28 PROCEDURE — 96374 THER/PROPH/DIAG INJ IV PUSH: CPT

## 2025-06-28 PROCEDURE — 80053 COMPREHEN METABOLIC PANEL: CPT | Performed by: EMERGENCY MEDICINE

## 2025-06-28 PROCEDURE — 93010 ELECTROCARDIOGRAM REPORT: CPT

## 2025-06-28 RX ORDER — MIRTAZAPINE 15 MG/1
15 TABLET, FILM COATED ORAL NIGHTLY
Qty: 90 TABLET | Refills: 0 | Status: SHIPPED | OUTPATIENT
Start: 2025-06-28

## 2025-06-28 RX ORDER — MORPHINE SULFATE 4 MG/ML
4 INJECTION, SOLUTION INTRAMUSCULAR; INTRAVENOUS EVERY 30 MIN PRN
Status: DISCONTINUED | OUTPATIENT
Start: 2025-06-28 | End: 2025-06-28

## 2025-06-28 NOTE — ED INITIAL ASSESSMENT (HPI)
HX of brain injury. Dr. Cleaning is neurologist. States she was awoken yesterday AT 0900 by a sharp pain in her head, blurred vision and disorientation. Tearful in triage. Also has left hip pain that radiates down her leg.
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
## 2025-06-29 LAB
ATRIAL RATE: 61 BPM
P AXIS: 76 DEGREES
P-R INTERVAL: 158 MS
Q-T INTERVAL: 410 MS
QRS DURATION: 80 MS
QTC CALCULATION (BEZET): 412 MS
R AXIS: 56 DEGREES
T AXIS: 40 DEGREES
VENTRICULAR RATE: 61 BPM

## 2025-06-29 NOTE — DISCHARGE INSTRUCTIONS
You are leaving the ER prior to the completion of the recommended testing.  You can return at any time to have further testing done.  If you choose to follow-up with your neurologist, call to make an appointment ASAP.

## 2025-06-29 NOTE — ED QUICK NOTES
Pt updated on poc = MRI eta 4-5 hours per MRI tech [id number] =pt expressed her disappointment waiting for mri.--sister at  has to work tonoc=also disappointed.

## 2025-06-30 ENCOUNTER — APPOINTMENT (OUTPATIENT)
Dept: PHYSICAL THERAPY | Facility: HOSPITAL | Age: 60
End: 2025-06-30
Attending: ORTHOPAEDIC SURGERY
Payer: MEDICAID

## 2025-07-03 ENCOUNTER — TELEPHONE (OUTPATIENT)
Dept: PHYSICAL THERAPY | Facility: HOSPITAL | Age: 60
End: 2025-07-03

## 2025-07-03 ENCOUNTER — APPOINTMENT (OUTPATIENT)
Dept: PHYSICAL THERAPY | Facility: HOSPITAL | Age: 60
End: 2025-07-03
Attending: ORTHOPAEDIC SURGERY

## 2025-07-08 ENCOUNTER — APPOINTMENT (OUTPATIENT)
Dept: PHYSICAL THERAPY | Facility: HOSPITAL | Age: 60
End: 2025-07-08
Attending: ORTHOPAEDIC SURGERY

## 2025-07-08 ENCOUNTER — TELEPHONE (OUTPATIENT)
Dept: PHYSICAL THERAPY | Facility: HOSPITAL | Age: 60
End: 2025-07-08

## 2025-07-10 ENCOUNTER — HOSPITAL ENCOUNTER (EMERGENCY)
Facility: HOSPITAL | Age: 60
Discharge: HOME | End: 2025-07-10
Attending: EMERGENCY MEDICINE
Payer: MEDICAID

## 2025-07-10 VITALS
RESPIRATION RATE: 18 BRPM | HEIGHT: 64 IN | SYSTOLIC BLOOD PRESSURE: 142 MMHG | TEMPERATURE: 98 F | WEIGHT: 210 LBS | OXYGEN SATURATION: 99 % | DIASTOLIC BLOOD PRESSURE: 88 MMHG | HEART RATE: 88 BPM | BODY MASS INDEX: 35.85 KG/M2

## 2025-07-10 DIAGNOSIS — R51.9 NONINTRACTABLE HEADACHE, UNSPECIFIED CHRONICITY PATTERN, UNSPECIFIED HEADACHE TYPE: Primary | ICD-10-CM

## 2025-07-10 PROCEDURE — 99284 EMERGENCY DEPT VISIT MOD MDM: CPT

## 2025-07-10 PROCEDURE — 99283 EMERGENCY DEPT VISIT LOW MDM: CPT

## 2025-07-10 RX ORDER — TOPIRAMATE 25 MG/1
25 TABLET, FILM COATED ORAL ONCE
Status: COMPLETED | OUTPATIENT
Start: 2025-07-10 | End: 2025-07-10

## 2025-07-10 RX ORDER — DIAZEPAM 2 MG/1
2 TABLET ORAL ONCE
Status: COMPLETED | OUTPATIENT
Start: 2025-07-10 | End: 2025-07-10

## 2025-07-10 NOTE — ED PROVIDER NOTES
[facility]  Emergency Department Attending Note     Chief Complaint:   Chief Complaint   Patient presents with    Headache    Back Pain     HISTORY OF PRESENT ILLNESS:   Lauren Weston is a 59 year old female who presents to the ED with a past medical history including seizure disorder, chronic migraine headaches, traumatic brain injury from 2007 presents with a complaint of her usual headache today.  She states that she has headache to the entire head, gradual in onset and not the worst headache of life.  It has been constant for several weeks.  States that she believes her neurologist wants her to have an MRI.  Denies nausea vomiting.  She was complaint mentions back pain patient states that she has had years of back pain radiating down the left leg unchanged today.     MEDICAL & SOCIAL HISTORY:   Past Medical History[1] Past Surgical History[2] Short Social Hx on File[3] Allergies[4] Current Medications[5]       REVIEW OF SYSTEMS   A 10 point review of systems was completed and is negative except as listed in history of present illness      PHYSICAL EXAM   Vitals: BP (!) 146/100   Pulse 94   Temp 97.6 °F (36.4 °C)   Resp 16   Ht 162.6 cm (5' 4\")   Wt 95.3 kg   SpO2 99%   BMI 36.05 kg/m²   BP (!) 146/100   Pulse 94   Temp 97.6 °F (36.4 °C)   Resp 16   Ht 162.6 cm (5' 4\")   Wt 95.3 kg   SpO2 99%   BMI 36.05 kg/m²     General: A&Ox3, NAD  Constitutional: Well developed, well nourished, nontoxic  Head: atraumatic, normocephalic   Eyes: conjuctiva clear, no icterus, PERRL, EOMI, vision grossly normal  Ears: normal external appearance, no drainage  Nose:  Atraumatic, no swelling, no drainage, nares patent  Throat:  Moist pink mucous membranes, airway is patent  Neck:  Soft supple, no masses, no tracheal deviation, no stridor  Chest:  No bruising or abrasions, no tenderness, no deformity  Cardiac:  Regular rate and rhythm  Lung:  No distress, no retractions. Clear to auscultation bilaterally, no  w/r/r  Abdomen:  Soft, nontender, nondistended, normal BS  Back: No stepoff/deformity  Extremities: FROM all ext, no deformities, intact equal peripheral pulses, no cyanosis or edema  Neuro: No facial droop, no slurred speech, moving all extremities freely, SILT to the bilateral upper and lower extremities  Psych: A&Ox3, normal affect, cooperative, calm  Skin: No rash, no petechiae/purpura, warm, dry      RESULTS  LABS:   Results for orders placed or performed during the hospital encounter of 06/28/25   CBC With Differential With Platelet    Collection Time: 06/28/25  2:21 PM   Result Value Ref Range    WBC 9.0 4.0 - 11.0 x10(3) uL    RBC 4.89 3.80 - 5.30 x10(6)uL    HGB 14.0 12.0 - 16.0 g/dL    HCT 43.0 35.0 - 48.0 %    .0 150.0 - 450.0 10(3)uL    MCV 87.9 80.0 - 100.0 fL    MCH 28.6 26.0 - 34.0 pg    MCHC 32.6 31.0 - 37.0 g/dL    RDW 13.7 %    Neutrophil Absolute Prelim 4.14 1.50 - 7.70 x10 (3) uL    Neutrophil Absolute 4.14 1.50 - 7.70 x10(3) uL    Lymphocyte Absolute 4.16 (H) 1.00 - 4.00 x10(3) uL    Monocyte Absolute 0.43 0.10 - 1.00 x10(3) uL    Eosinophil Absolute 0.15 0.00 - 0.70 x10(3) uL    Basophil Absolute 0.06 0.00 - 0.20 x10(3) uL    Immature Granulocyte Absolute 0.02 0.00 - 1.00 x10(3) uL    Neutrophil % 46.2 %    Lymphocyte % 46.4 %    Monocyte % 4.8 %    Eosinophil % 1.7 %    Basophil % 0.7 %    Immature Granulocyte % 0.2 %   Comp Metabolic Panel (14)    Collection Time: 06/28/25  2:21 PM   Result Value Ref Range    Glucose 100 (H) 70 - 99 mg/dL    Sodium 141 136 - 145 mmol/L    Potassium 5.0 3.5 - 5.1 mmol/L    Chloride 115 (H) 98 - 112 mmol/L    CO2 16.0 (L) 21.0 - 32.0 mmol/L    Anion Gap 10 0 - 18 mmol/L    BUN 11 9 - 23 mg/dL    Creatinine 1.10 (H) 0.55 - 1.02 mg/dL    Calcium, Total 9.3 8.7 - 10.6 mg/dL    Calculated Osmolality 291 275 - 295 mOsm/kg    eGFR-Cr 59 (L) >=60 mL/min/1.73m2    AST 21 <34 U/L    ALT 15 10 - 49 U/L    Alkaline Phosphatase 120 (H) 46 - 118 U/L    Bilirubin,  Total 0.2 (L) 0.3 - 1.2 mg/dL    Total Protein 7.6 5.7 - 8.2 g/dL    Albumin 4.5 3.2 - 4.8 g/dL    Globulin  3.1 2.0 - 3.5 g/dL    A/G Ratio 1.5 1.0 - 2.0   Salicylate, Serum    Collection Time: 06/28/25  2:21 PM   Result Value Ref Range    Salicylate <3.0 (L) 3.0 - 20.0 mg/dL   Acetaminophen (Tylenol), S    Collection Time: 06/28/25  2:21 PM   Result Value Ref Range    Acetaminophen <2.0 (L) 10.0 - 20.0 ug/mL   Troponin I (High Sensitivity)    Collection Time: 06/28/25  2:21 PM   Result Value Ref Range    Troponin I (High Sensitivity) <3 <=34 ng/L   RAINBOW DRAW LAVENDER    Collection Time: 06/28/25  2:21 PM   Result Value Ref Range    Hold Lavender Auto Resulted    RAINBOW DRAW LIGHT GREEN    Collection Time: 06/28/25  2:21 PM   Result Value Ref Range    Hold Lt Green Auto Resulted    RAINBOW DRAW BLUE    Collection Time: 06/28/25  2:21 PM   Result Value Ref Range    Hold Blue Auto Resulted    RAINBOW DRAW GOLD    Collection Time: 06/28/25  2:21 PM   Result Value Ref Range    Hold Gold Auto Resulted    Lactic Acid, Plasma    Collection Time: 06/28/25  3:40 PM   Result Value Ref Range    Lactic Acid 1.0 0.5 - 2.0 mmol/L   Venous Blood Gas    Collection Time: 06/28/25  3:54 PM   Result Value Ref Range    Venous pH 7.32 (L) 7.33 - 7.43    Venous pCO2 41 38 - 50 mm Hg    Venous pO2 38 30 - 50 mm Hg    Venous HCO3 20.3 (L) 22.0 - 26.0 mEq/L    Venous Base Excess -4.8     Venous O2Hb 65.5 (L) 72.0 - 78.0 %    Venous Sample Site Vein     Venous O2 Del Device Room Air    EKG    Collection Time: 06/28/25  4:37 PM   Result Value Ref Range    Ventricular rate 61 BPM    Atrial rate 61 BPM    P-R Interval 158 ms    QRS Duration 80 ms    Q-T Interval 410 ms    QTC Calculation (Bezet) 412 ms    P Axis 76 degrees    R Axis 56 degrees    T Axis 40 degrees         IMAGING: No results found.        Procedures:   Procedures       ED COURSE          Re-Evaluation: Improved      Disposition & Plan:   Clinical Impression/Final  Diagnosis:   1. Nonintractable headache, unspecified chronicity pattern, unspecified headache type        Medical Decision Making: I did review the patient's last ER visit, reviewed the imaging, and called the patient's neurologist.  She did evaluate the case and reviewed the prior imaging as well, does not feel that MRI or further imaging is warranted at this time.  She does recommend increasing topiramate to twice daily and I discussed this with the patient and she will adjust her dosing as such.  She will follow-up very closely with primary doctor and neurologist in the next day or 2 and she has instructions to return immediately for any worsening symptoms or any concerns.  The patient has left understanding and agreement with this plan.    Medical Decision Making  Amount and/or Complexity of Data Reviewed  External Data Reviewed: notes.  Radiology:  Decision-making details documented in ED Course.     Details: Reviewed CT from last emergency department visit  Discussion of management or test interpretation with external provider(s): Discussed with the patient's neurologist who recommends no MRI at this time, close outpatient follow-up and to increase topiramate    Risk  OTC drugs.  Prescription drug management.  Decision regarding hospitalization.  Risk Details: Increased risk due to history of TBI, broad differential considered including but not limited to intracranial hemorrhage, perfusion defect        *Please note that in the presenting to the emergency department, illness/injury that poses a threat to life or function is considered during this patient's initial evaluation.    The complexity of this visit is therefore inherently more complex given the need to consider life threatening pathology prior to any other etiology for this patient's visit.    The medical decision above exemplifies this rationale.     Disposition: Discharge  There are no disposition comments on file for this visit.  MDM           Disposition and Plan     Clinical Impression:  1. Nonintractable headache, unspecified chronicity pattern, unspecified headache type         Disposition:  Discharge  7/10/2025 10:15 am    Follow-up:  Krish Little MD  [address]  [facility] [address]  [phone]    Schedule an appointment as soon as possible for a visit in 2 day(s)      Belia Cleaning MD  [address]  [facility] [address]  [phone]    Schedule an appointment as soon as possible for a visit in 2 day(s)            Medications Prescribed:  Current Discharge Medication List          Supplementary Documentation:                                                     This note was generated in part using voice recognition dictation technology. The report was reviewed by this physician but still may have unintentional errors due to inherent limitations of voice recognition technology. All times are estimates.         [1]   Past Medical History:   Back pain    Heart murmur    High blood pressure    Hx of head injury    Hx of migraine headaches    Hyperlipidemia    Seizure disorder (HCC)    Brain spasms    Spasm    Chronic nerve spasms    TBI (traumatic brain injury) (HCC)    Unspecified essential hypertension    Visual impairment    glasses   [2]   Past Surgical History:  Procedure Laterality Date    Cholecystectomy  1999    Colonoscopy N/A 08/17/2017    Procedure: COLONOSCOPY;  Surgeon: Duane Almazan MD;  Location: [facility] ENDOSCOPY    Electrocardiogram, complete  04/16/2012    SCANNED TO MEDIA TAB: 04-    Hysterectomy  2007    partial    Wil biopsy stereo nodule 1 site right (cpt=19081) Right 04/2022    ADH    Wil localization wire 1 site right (cpt=19281) Right 06/2022    Usual ductal hyperplasia    Other Left     rempved mass on hip    Tubal ligation Bilateral 1990   [3]   Social History  Socioeconomic History    Marital status:     Number of children: 2   Occupational History    Occupation:  Unemployed   Tobacco Use    Smoking status: Every Day     Current packs/day: 0.25     Average packs/day: 0.3 packs/day for 21.0 years (5.3 ttl pk-yrs)     Types: Cigarettes     Passive exposure: Current    Smokeless tobacco: Never    Tobacco comments:     smokes only 3-4 sticks /day   Vaping Use    Vaping status: Never Used   Substance and Sexual Activity    Alcohol use: No     Alcohol/week: 0.0 standard drinks of alcohol    Drug use: Yes     Frequency: 7.0 times per week     Types: Cannabis     Comment: daily    Other Topics Concern    Caffeine Concern No    Exercise Yes     Comment: Daily   Social History Narrative    The patient does not use an assistive device..      The patient does not live in a home with stairs.     Social Drivers of Health      Received from [facility]    Housing Stability   [4]   Allergies  Allergen Reactions    Baby Oil RASH and RESPIRATORY FAILURE    Clemizole ANAPHYLAXIS    [place] RESPIRATORY FAILURE    Orange Juice [Orange Oil] RESPIRATORY FAILURE    Banana NAUSEA AND VOMITING    Penicillins ANAPHYLAXIS   [5]   Current Outpatient Medications   Medication Sig Dispense Refill    mirtazapine 15 MG Oral Tab Take 1 tablet (15 mg total) by mouth nightly. 90 tablet 0    albuterol 108 (90 Base) MCG/ACT Inhalation Aero Soln Inhale 2 puffs into the lungs every 6 (six) hours as needed for Wheezing. 9 g 0    butalbital-acetaminophen-caffeine -40 MG Oral Tab Take 1 tablet by mouth 2 (two) times daily as needed. 60 tablet 1    lisinopril 10 MG Oral Tab Take 1 tablet (10 mg total) by mouth 2 (two) times daily. 180 tablet 3    amLODIPine 10 MG Oral Tab Take 1 tablet (10 mg total) by mouth nightly. 90 tablet 3    diazePAM 5 MG Oral Tab Take 1 tablet (5 mg total) by mouth every 12 (twelve) hours as needed for Anxiety (spasm). 60 tablet 2    TOPIRAMATE 50 MG Oral Tab Take 1 tablet by mouth in the evening 30 tablet 2    ezetimibe 10 MG Oral Tab Take 1 tablet (10 mg total) by  mouth daily. 90 tablet 3    gabapentin 300 MG Oral Cap Take 1 capsule (300 mg total) by mouth 4 (four) times daily. 360 capsule 3    atorvastatin 40 MG Oral Tab Take 1 tablet (40 mg total) by mouth nightly. 90 tablet 3    fluticasone propionate 50 MCG/ACT Nasal Suspension 2 sprays by Each Nare route daily. 1 each 2

## 2025-07-10 NOTE — ED INITIAL ASSESSMENT (HPI)
Pt presents to ED stating \"[facility] tried to kill me\". Pt states she has a hx TBI, went to [facility]  for c/o headache as well as lower back pain radiating down left leg.   Pt states they gave her Morphine despite her telling them not to and that she became unconscious and her sister \"thought she \". Pt states she felt worse after leaving the hospital, both back pain and headache, and would like to be re-evaluated. Tearful in triage

## 2025-07-15 ENCOUNTER — PATIENT MESSAGE (OUTPATIENT)
Dept: PHYSICAL THERAPY | Facility: HOSPITAL | Age: 60
End: 2025-07-15

## 2025-07-17 DIAGNOSIS — F41.9 ANXIETY: ICD-10-CM

## 2025-07-17 DIAGNOSIS — M62.838 MUSCLE SPASM: ICD-10-CM

## 2025-07-18 RX ORDER — DIAZEPAM 5 MG/1
5 TABLET ORAL EVERY 12 HOURS PRN
Qty: 60 TABLET | Refills: 2 | Status: SHIPPED | OUTPATIENT
Start: 2025-07-18

## 2025-07-18 NOTE — TELEPHONE ENCOUNTER
The patient is requesting a refill on: diazePAM 5 MG Oral Tab     [place]): dia[place]AM     Dispensed Written Strength Quantity Refills Days Supply Provider Pharmacy   DIAZEPAM 5MG        TAB 06/22/2025 04/24/2025  60 each  30 Belia Cleaning MD [facility] ...   DIAZEPAM 5MG        TAB 05/24/2025 04/24/2025  60 each  30 Belia Cleaning MD [facility] ...   DIAZEPAM 5MG        TAB 04/26/2025 04/24/2025  60 each  30 TerrelldaBelia carias MD [facility] ...   DIAZEPAM 5MG        TAB 03/28/2025 01/27/2025  60 each  30 Belia Cleaning MD [facility] ...   DIAZEPAM 5MG        TAB 02/26/2025 01/27/2025  60 each  30 Belia Cleaning MD [facility] ...   DIAZEPAM 5MG        TAB 01/28/2025 01/27/2025  60 each  30 Belia Cleaning MD [facility] ...     Last OV: 4/23/25  Next OV:   Future Appointments   Date Time Provider Department Center   8/5/2025 12:30 PM Lou Wyman, PT [facility]   8/8/2025 11:15 AM [facility]na[facility]   8/11/2025  1:30 PM [facility]yman[facility]      ASSESSMENT/PLAN:      (G44.329) Chronic post-traumatic headache, not intractable        History of chronic post traumatic headache and now superimposed stress headaches   Stable overall.  Continue Topamax 50 mg daily  Continue Fioricet prn ( limit 2-3 per week)  On Valium chronically for spasms and anxiety- unable to wean off     Advise to see Ophthalmologist for right eyelid lesion      RTC in about 12 months    Instructions    Return in about 1 year (around 4/23/2026).

## 2025-07-24 ENCOUNTER — OFFICE VISIT (OUTPATIENT)
Dept: INTERNAL MEDICINE CLINIC | Facility: CLINIC | Age: 60
End: 2025-07-24
Payer: MEDICAID

## 2025-07-24 ENCOUNTER — APPOINTMENT (OUTPATIENT)
Dept: PHYSICAL THERAPY | Facility: HOSPITAL | Age: 60
End: 2025-07-24
Attending: ORTHOPAEDIC SURGERY

## 2025-07-24 ENCOUNTER — HOSPITAL ENCOUNTER (OUTPATIENT)
Dept: GENERAL RADIOLOGY | Age: 60
Discharge: HOME | End: 2025-07-24
Attending: INTERNAL MEDICINE
Payer: MEDICAID

## 2025-07-24 VITALS
WEIGHT: 225.06 LBS | DIASTOLIC BLOOD PRESSURE: 76 MMHG | SYSTOLIC BLOOD PRESSURE: 120 MMHG | BODY MASS INDEX: 39 KG/M2 | HEART RATE: 93 BPM

## 2025-07-24 DIAGNOSIS — G44.329 CHRONIC POST-TRAUMATIC HEADACHE, NOT INTRACTABLE: ICD-10-CM

## 2025-07-24 DIAGNOSIS — M25.552 ACUTE HIP PAIN, LEFT: ICD-10-CM

## 2025-07-24 DIAGNOSIS — R56.9 SEIZURE (HCC): ICD-10-CM

## 2025-07-24 DIAGNOSIS — N18.31 STAGE 3A CHRONIC KIDNEY DISEASE (HCC): ICD-10-CM

## 2025-07-24 DIAGNOSIS — R51.9 PERSISTENT HEADACHES: Primary | ICD-10-CM

## 2025-07-24 DIAGNOSIS — Z86.39 HISTORY OF PINEAL CYST: ICD-10-CM

## 2025-07-24 DIAGNOSIS — I10 PRIMARY HYPERTENSION: ICD-10-CM

## 2025-07-24 DIAGNOSIS — E78.2 MIXED HYPERLIPIDEMIA: ICD-10-CM

## 2025-07-24 PROCEDURE — 99214 OFFICE O/P EST MOD 30 MIN: CPT | Performed by: INTERNAL MEDICINE

## 2025-07-24 PROCEDURE — 73502 X-RAY EXAM HIP UNI 2-3 VIEWS: CPT | Performed by: INTERNAL MEDICINE

## 2025-07-24 NOTE — TELEPHONE ENCOUNTER
Requested Prescriptions     Pending Prescriptions Disp Refills    butalbital-acetaminophen-caffeine -40 MG Oral Tab 60 tablet 1     Sig: Take 1 tablet by mouth 2 (two) times daily as needed.     [place]/;  Butalbital-APAP-Caffeine     Dispensed Written Strength Quantity Refills Days Supply Provider Pharmacy   BUTALBITAL-ACETAMINOPHEN-CAFFEINE 06/28/2025 05/28/2025 40 mg 60  30 MUQTADABELIA AKINS MD [facility]    BUTALBITAL-ACETAMINOPHEN-CAFFEINE 05/30/2025 05/28/2025 40 mg 60  30 MUQTADABELIA AKINS MD [facility]    BUTALBITAL-ACETAMINOPHEN-CAFFEINE 05/01/2025 04/01/2025 40 mg 60  30 MUQTADABELIA AKINS MD [facility]    BUTALBITAL-ACETAMINOPHEN-CAFFEINE 04/03/2025 04/01/2025 40 mg 60  30 MUQTADABELIA AKINS MD [facility]      Last OV: 4/23/2025  Next OV:     Last office visit plan;    ASSESSMENT/PLAN:     (G44.329) Chronic post-traumatic headache, not intractable        History of chronic post traumatic headache and now superimposed stress headaches   Stable overall.  Continue Topamax 50 mg daily  Continue Fioricet prn ( limit 2-3 per week)  On Valium chronically for spasms and anxiety- unable to wean off     Advise to see Ophthalmologist for right eyelid lesion      RTC in about 12 months     See orders and medications filed with this encounter. The patient indicates understanding of these issues and agrees with the plan.           Belia Cleaning MD  [facility] This Visit:  Requested Prescriptions        No prescriptions requested or ordered in this encounter        Imaging & Referrals:  None      ID#[id number]  Other NotesAll notes   Progress Notes from Pauline Casas MA  Instructions   Return in about 1 year (around 4/23/2026).

## 2025-07-24 NOTE — PROGRESS NOTES
Subjective:     Patient ID: Lauren Weston is a 59 year old female.    Pt here for ffup from ER for 2 visits due to headaches. Pt states had been having increasing severity of her headaches. Noted by pt at least more than a month. She states she had pain on top of her head and feels like pressure in the head. She is also having some vision problems.  Pt had done in CTA of brain and carotid in ER , results were negative. In her last ER visit, the ER MD talked to pt's neurologist and was told that mri was not indicated due to pt's findings of CTA.     Hip Pain   The pain is present in the left hip, left upper leg and left foot. This is a new problem. The current episode started more than 1 month ago (4 to 6week). There has been no history of extremity trauma. The problem occurs intermittently. The problem has been waxing and waning. The quality of the pain is described as aching. The pain is moderate. Pertinent negatives include no joint swelling or limited range of motion.       History/Other:   Review of Systems   Constitutional: Negative.    HENT: Negative.     Eyes: Negative.    Respiratory: Negative.     Cardiovascular: Negative.    Genitourinary: Negative.    Neurological:  Positive for light-headedness and headaches. Negative for facial asymmetry.     Current Medications[1]  Allergies:Allergies[2]    Past Medical History[3]   Past Surgical History[4]   Family History[5]   Social History: Short Social Hx on File[6]     Objective:   Physical Exam  Constitutional:       General: She is not in acute distress.     Appearance: Normal appearance. She is obese. She is not ill-appearing, toxic-appearing or diaphoretic.   HENT:      Right Ear: Tympanic membrane, ear canal and external ear normal.      Left Ear: Tympanic membrane, ear canal and external ear normal.   Eyes:      General: No scleral icterus.        Right eye: No discharge.         Left eye: No discharge.      Conjunctiva/sclera: Conjunctivae normal.       Pupils: Pupils are equal, round, and reactive to light.   Cardiovascular:      Rate and Rhythm: Normal rate and regular rhythm.      Heart sounds: Normal heart sounds. No murmur heard.     No gallop.   Pulmonary:      Effort: No respiratory distress.      Breath sounds: Normal breath sounds. No wheezing or rales.   Abdominal:      General: Abdomen is flat. Bowel sounds are normal. There is no distension.      Palpations: Abdomen is soft.      Tenderness: There is no abdominal tenderness.   Musculoskeletal:         General: No deformity.      Cervical back: Normal range of motion and neck supple. No rigidity.      Right lower leg: No edema.      Left lower leg: No edema.   Lymphadenopathy:      Cervical: No cervical adenopathy.   Skin:     Coloration: Skin is not jaundiced or pale.      Findings: No erythema or rash.   Neurological:      Mental Status: She is alert. Mental status is at baseline.      Cranial Nerves: No cranial nerve deficit.      Sensory: No sensory deficit.      Coordination: Coordination normal.      Deep Tendon Reflexes: Reflexes normal.         Assessment & Plan:     ICD-10-CM    1. Persistent headaches  R51.9 Neuro Referral - In Network   Patient had persistent headaches for the past 4 to 6 weeks.  No trauma.  Patient was evaluated in the ER at least twice CT scan with CTA of the brain with stent intact.  Patient request to be referred to another neurologist and I told her because of insurance she should check with her insurance who is a neurologist she could see and we will keep hip referrals at this time.  Call back for any worsening or persistence or go to ER.   2. History of pineal cyst  Z86.39 Neuro Referral - In Network   Patient did have a CT of the brain as well as CT of the brain, ER consulted.  \".  She is requesting to see another neurologist to help manage her headaches.  And referred her back to  Stroke clinic.  Neuro Referral - In Network      3. Acute hip pain, left  M25.552  CANCELED: XR HIP + PELVIS MIN 4 VIEWS LEFT (CPT=73503)   Will get an x-ray of the left hip.   4. Primary hypertension  I10    Blood pressure controlled with current BP meds.  CPM.   5. Mixed hyperlipidemia  E78.2    Continue with current cholesterol medication.   6. Stage 3a chronic kidney disease (HCC)  N18.31    Patient to continue to avoid NSAIDs.   7. Seizure (HCC)  R56.9    Patient history of chronic chronic seizures.  She has been on antiseizure medications managed by her neurologist.        No orders of the defined types were placed in this encounter.      Meds This Visit:  Requested Prescriptions      No prescriptions requested or ordered in this encounter       Imaging & Referrals:  None            [1]   Current Outpatient Medications   Medication Sig Dispense Refill    diazePAM 5 MG Oral Tab Take 1 tablet (5 mg total) by mouth every 12 (twelve) hours as needed for Anxiety (spasm). 60 tablet 2    topiramate 50 MG Oral Tab Take 1 tablet (50 mg total) by mouth 2 (two) times daily. 60 tablet 2    mirtazapine 15 MG Oral Tab Take 1 tablet (15 mg total) by mouth nightly. 90 tablet 0    albuterol 108 (90 Base) MCG/ACT Inhalation Aero Soln Inhale 2 puffs into the lungs every 6 (six) hours as needed for Wheezing. 9 g 0    butalbital-acetaminophen-caffeine -40 MG Oral Tab Take 1 tablet by mouth 2 (two) times daily as needed. 60 tablet 1    lisinopril 10 MG Oral Tab Take 1 tablet (10 mg total) by mouth 2 (two) times daily. 180 tablet 3    amLODIPine 10 MG Oral Tab Take 1 tablet (10 mg total) by mouth nightly. 90 tablet 3    ezetimibe 10 MG Oral Tab Take 1 tablet (10 mg total) by mouth daily. 90 tablet 3    gabapentin 300 MG Oral Cap Take 1 capsule (300 mg total) by mouth 4 (four) times daily. 360 capsule 3    atorvastatin 40 MG Oral Tab Take 1 tablet (40 mg total) by mouth nightly. 90 tablet 3    fluticasone propionate 50 MCG/ACT Nasal Suspension 2 sprays by Each Nare route daily. 1 each 2   [2]    Allergies  Allergen Reactions    Baby Oil RASH and RESPIRATORY FAILURE    Clemizole ANAPHYLAXIS    [place] RESPIRATORY FAILURE    Orange Juice [Orange Oil] RESPIRATORY FAILURE    Banana NAUSEA AND VOMITING    Penicillins ANAPHYLAXIS   [3]   Past Medical History:   Back pain    Heart murmur    High blood pressure    Hx of head injury    Hx of migraine headaches    Hyperlipidemia    Seizure disorder (HCC)    Brain spasms    Spasm    Chronic nerve spasms    TBI (traumatic brain injury) (HCC)    Unspecified essential hypertension    Visual impairment    glasses   [4]   Past Surgical History:  Procedure Laterality Date    Cholecystectomy  1999    Colonoscopy N/A 08/17/2017    Procedure: COLONOSCOPY;  Surgeon: Duane Almazan MD;  Location: [facility] ENDOSCOPY    Electrocardiogram, complete  04/16/2012    SCANNED TO MEDIA TAB: 04-    Hysterectomy  2007    partial    Wil biopsy stereo nodule 1 site right (cpt=19081) Right 04/2022    ADH    Wil localization wire 1 site right (cpt=19281) Right 06/2022    Usual ductal hyperplasia    Other Left     rempved mass on hip    Tubal ligation Bilateral 1990   [5]   Family History  Problem Relation Age of Onset    Heart Disorder Father         Bypass x 3, heroin addict, kidney stones    No Known Problems Mother     No Known Problems Sister    [6]   Social History  Socioeconomic History    Marital status:     Number of children: 2   Occupational History    Occupation: Unemployed   Tobacco Use    Smoking status: Every Day     Current packs/day: 0.25     Average packs/day: 0.3 packs/day for 21.0 years (5.3 ttl pk-yrs)     Types: Cigarettes     Passive exposure: Current    Smokeless tobacco: Never    Tobacco comments:     smokes only 3-4 sticks /day   Vaping Use    Vaping status: Never Used   Substance and Sexual Activity    Alcohol use: No     Alcohol/week: 0.0 standard drinks of alcohol    Drug use: Yes     Frequency: 7.0 times per week     Types: Cannabis     Comment:  daily    Other Topics Concern    Caffeine Concern No    Exercise Yes     Comment: Daily   Social History Narrative    The patient does not use an assistive device..      The patient does not live in a home with stairs.     Social Drivers of Health      Received from [facility]    Housing Stability

## 2025-07-25 RX ORDER — BUTALBITAL, ACETAMINOPHEN AND CAFFEINE 50; 325; 40 MG/1; MG/1; MG/1
1 TABLET ORAL 2 TIMES DAILY PRN
Qty: 60 TABLET | Refills: 1 | Status: SHIPPED | OUTPATIENT
Start: 2025-07-25

## 2025-07-30 ENCOUNTER — TELEPHONE (OUTPATIENT)
Dept: NEUROLOGY | Facility: CLINIC | Age: 60
End: 2025-07-30

## 2025-08-05 ENCOUNTER — APPOINTMENT (OUTPATIENT)
Dept: PHYSICAL THERAPY | Facility: HOSPITAL | Age: 60
End: 2025-08-05
Attending: ORTHOPAEDIC SURGERY

## 2025-08-08 ENCOUNTER — APPOINTMENT (OUTPATIENT)
Dept: PHYSICAL THERAPY | Facility: HOSPITAL | Age: 60
End: 2025-08-08
Attending: ORTHOPAEDIC SURGERY

## 2025-08-11 ENCOUNTER — APPOINTMENT (OUTPATIENT)
Dept: PHYSICAL THERAPY | Facility: HOSPITAL | Age: 60
End: 2025-08-11
Attending: ORTHOPAEDIC SURGERY

## (undated) DIAGNOSIS — G44.329 CHRONIC POST-TRAUMATIC HEADACHE, NOT INTRACTABLE: ICD-10-CM

## (undated) DIAGNOSIS — N60.91 ATYPICAL DUCTAL HYPERPLASIA OF RIGHT BREAST: Primary | ICD-10-CM

## (undated) DIAGNOSIS — F41.9 ANXIETY: ICD-10-CM

## (undated) DEVICE — ENDOSCOPY PACK - LOWER: Brand: MEDLINE INDUSTRIES, INC.

## (undated) DEVICE — LIGHT HANDLE

## (undated) DEVICE — SUT SILK 2-0 FS 685G

## (undated) DEVICE — CLEAR MONOFILAMENT (POLYDIOXANONE), ABSORBABLE SURGICAL SUTURE: Brand: PDS

## (undated) DEVICE — Device: Brand: DEFENDO AIR/WATER/SUCTION AND BIOPSY VALVE

## (undated) DEVICE — ABDOMINAL PAD: Brand: DERMACEA

## (undated) DEVICE — BRA SURGICAL ELIZABETH PINK XL

## (undated) DEVICE — DRAPE,TAPE STRIPS,STERILE: Brand: MEDLINE

## (undated) DEVICE — STERILE POLYISOPRENE POWDER-FREE SURGICAL GLOVES: Brand: PROTEXIS

## (undated) DEVICE — BREAST-HERNIA-PORT CDS-LF: Brand: MEDLINE INDUSTRIES, INC.

## (undated) DEVICE — DRAPE PACK CHEST & U BAR

## (undated) DEVICE — MEGADYNE E-Z CLEAN BLADE 2.75"

## (undated) DEVICE — Device

## (undated) DEVICE — SLEEVE KENDALL SCD EXPRESS MED

## (undated) DEVICE — HEMOCLIP HORIZON MED 002200

## (undated) DEVICE — SUT VICRYL 3-0 SH J416H

## (undated) DEVICE — HEMOCLIP HORIZON SM 001200

## (undated) DEVICE — 3M™ STERI-DRAPE™ INSTRUMENT POUCH 1018: Brand: STERI-DRAPE™

## (undated) DEVICE — TOWEL SURG OR 17X30IN BLUE

## (undated) DEVICE — SUT MONOCRYL 4-0 PS-2 Y496G

## (undated) DEVICE — GOWN,SIRUS,FABRIC-REINFORCED,LARGE: Brand: MEDLINE

## (undated) DEVICE — UNDERPAD 23X36 LIGHT CHUX

## (undated) DEVICE — SOLUTION  .9 1000ML BTL
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
## (undated) NOTE — MR AVS SNAPSHOT
[facility]  [address] [facility] [address] [phone]               Thank you for choosing us for your health care visit with KAT Rivas. We are glad to serve you and happy to provide you with this summary of your visit. TAKE 1 TABLET BY MOUTH AS NEEDED FOR HEADACHE NO MORE THAN 2 TABLETS PER DAY OR NO MORE THAN 3 TIMES PER WEEK   Commonly known as:  FIORICET, ESGIC           diazepam 5 MG Tabs   TAKE 1 TABLET BY MOUTH AT BEDTIME   Commonly known as:  VALIUM           ibup

## (undated) NOTE — MR AVS SNAPSHOT
[place] [address]  [phone]               Thank you for choosing us for your health care visit with Hernando Mark MD.  We are glad to serve you and happy to provide you with this summar Leni Ivey MD   [facility]   [address] [facility] [address]   Phone:  [phone]   Fax:  [phone]         Referral Orders      Normal Orders This Visit    GASTRO - INTERNAL [[id number] CUSTOM]  Order #:  [id number]         **REFERRAL REQUEST* status migrainosus, not intractable [G43.009]          Reason for Today's Visit     Physical           Medical Issues Discussed Today     Essential hypertension with goal blood pressure less than 140/90    Mixed hyperlipidemia    Annual physical exam    - If you have questions, you can call [phone] to talk to our [facility] Staff. Remember, [facility] is NOT to be used for urgent needs. For medical emergencies, dial 911. Visit [email or web address] to learn more.         Educational Infor Enjoy your food, but eat less. Fully enjoy your food when eating. Don’t eat while distracted and slow down. Avoid over sized portions. Don’t eat while when you’re bored.      EAT THESE FOODS MORE OFTEN: EAT THESE FOODS LESS OFTEN:   Make half your pl
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
## (undated) NOTE — LETTER
09/28/17        [address]      Dear [facility] records indicate that you have outstanding lab work and or testing that was ordered for you and has not yet been completed: CBC blood test to check for anemia.     To pro

## (undated) NOTE — LETTER
To:  Dr Paddy Bower  Date:  6/14/2022  Fax #: [phone]   Patient Name: Vladimir Syrian / Sex: 8/29/1965-A: 49 y  female  Phone:                                         CSN: [id number]                            Medical Records: [id number]    Clearance for Surgery Requested by Surgeon    Request for:  Medical Clearance    Requested by Surgeon: Dr Lai Isaac    Surgical Date: 6/17/2022    Procedure: Right breast wire localized excisional biopsy, Right      Please fax the clearance note to the [place] [phone]. Thank you.

## (undated) NOTE — LETTER
Patient Name: Saúl García  : 1965  MRN: [id number]  Patient Address: [address] [place] [address]      Coronavirus Disease 2019 (COVID-19)     [facility] is committed to the safety and well-being of our patients, members, em carefully. If your symptoms get worse, call your healthcare provider immediately. 3. Get rest and stay hydrated.    4. If you have a medical appointment, call the healthcare provider ahead of time and tell them that you have or may have COVID-19.  5. For m of fever-reducing medications; and  · Improvement in respiratory symptoms (e.g., cough, shortness of breath); and  · At least 10 days have passed since symptoms first appeared OR if asymptomatic patient or date of symptom onset is unclear then use 10 days donors must:    · Have had a confirmed diagnosis of COVID-19  · Be symptom-free for at least 14 days*    *Some people will be required to have a repeat COVID-19 test in order to be eligible to donate.  If you’re instructed by Regino that a repeat test is r random. Researchers are trying to identify similarities between people with a Post-COVID condition to better understand if there are risk factors. How do I prevent a Post-COVID condition?   The best way to prevent the long-term symptoms of COVID-19 is

## (undated) NOTE — MR AVS SNAPSHOT
[facility]  [address], [address] [facility]  Annabelal Castillo [phone]               Thank you for choosing us for your health care visit with Chayito Bowser MD.  We are glad to serve you and happy to provide you with this summary of your DAY OR NO MORE THAN 3 TIMES PER WEEK   Commonly known as:  FIORICET, ESGIC           diazepam 5 MG Tabs   TAKE 1 TABLET BY MOUTH AT BEDTIME   Commonly known as:  VALIUM           ibuprofen 600 MG Tabs   Take 1 tablet by mouth as needed.    Commonly known as
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
## (undated) NOTE — Clinical Note
Thank you for the consult. I saw Ms. Cordell Hernandez in the endocrine/diabetes clinic today. Please see attached my note. Please feel free to contact me with any questions. Thanks!